# Patient Record
Sex: MALE | Race: WHITE | Employment: OTHER | URBAN - METROPOLITAN AREA
[De-identification: names, ages, dates, MRNs, and addresses within clinical notes are randomized per-mention and may not be internally consistent; named-entity substitution may affect disease eponyms.]

---

## 2019-07-01 LAB — PSA, ULTRASENSITIVE: 3.61

## 2019-12-30 LAB
CHOLESTEROL, TOTAL: 167 MG/DL
CHOLESTEROL/HDL RATIO: 4.6
CREATININE: 1.2 MG/DL
HDLC SERPL-MCNC: 36 MG/DL (ref 35–70)
LDL CHOLESTEROL CALCULATED: 104 MG/DL (ref 0–160)
POTASSIUM (K+): 4.3
PSA, ULTRASENSITIVE: 4.47
TRIGL SERPL-MCNC: 134 MG/DL
VLDLC SERPL CALC-MCNC: 27 MG/DL

## 2020-02-25 ENCOUNTER — APPOINTMENT (OUTPATIENT)
Dept: GENERAL RADIOLOGY | Age: 75
End: 2020-02-25
Payer: MEDICARE

## 2020-02-25 ENCOUNTER — HOSPITAL ENCOUNTER (EMERGENCY)
Age: 75
Discharge: HOME | End: 2020-02-25
Attending: EMERGENCY MEDICINE
Payer: MEDICARE

## 2020-02-25 ENCOUNTER — APPOINTMENT (OUTPATIENT)
Dept: ULTRASOUND IMAGING | Age: 75
End: 2020-02-25
Payer: MEDICARE

## 2020-02-25 VITALS
SYSTOLIC BLOOD PRESSURE: 124 MMHG | DIASTOLIC BLOOD PRESSURE: 78 MMHG | HEIGHT: 69 IN | TEMPERATURE: 97.6 F | WEIGHT: 200 LBS | HEART RATE: 72 BPM | BODY MASS INDEX: 29.62 KG/M2 | OXYGEN SATURATION: 95 % | RESPIRATION RATE: 16 BRPM

## 2020-02-25 LAB
ALBUMIN SERPL-MCNC: 4.1 G/DL (ref 3.5–5.2)
ALP BLD-CCNC: 93 U/L (ref 40–129)
ALT SERPL-CCNC: 61 U/L (ref 0–40)
ANION GAP SERPL CALCULATED.3IONS-SCNC: 9 MMOL/L (ref 7–16)
AST SERPL-CCNC: 41 U/L (ref 0–39)
BASOPHILS ABSOLUTE: 0.02 E9/L (ref 0–0.2)
BASOPHILS RELATIVE PERCENT: 0.3 % (ref 0–2)
BILIRUB SERPL-MCNC: 0.8 MG/DL (ref 0–1.2)
BUN BLDV-MCNC: 19 MG/DL (ref 8–23)
CALCIUM SERPL-MCNC: 9.2 MG/DL (ref 8.6–10.2)
CHLORIDE BLD-SCNC: 104 MMOL/L (ref 98–107)
CO2: 25 MMOL/L (ref 22–29)
CREAT SERPL-MCNC: 1.1 MG/DL (ref 0.7–1.2)
EOSINOPHILS ABSOLUTE: 0.1 E9/L (ref 0.05–0.5)
EOSINOPHILS RELATIVE PERCENT: 1.5 % (ref 0–6)
GFR AFRICAN AMERICAN: >60
GFR NON-AFRICAN AMERICAN: >60 ML/MIN/1.73
GLUCOSE BLD-MCNC: 102 MG/DL (ref 74–99)
HCT VFR BLD CALC: 39.5 % (ref 37–54)
HEMOGLOBIN: 13.8 G/DL (ref 12.5–16.5)
IMMATURE GRANULOCYTES #: 0.02 E9/L
IMMATURE GRANULOCYTES %: 0.3 % (ref 0–5)
LACTIC ACID: 0.9 MMOL/L (ref 0.5–2.2)
LIPASE: 17 U/L (ref 13–60)
LYMPHOCYTES ABSOLUTE: 0.94 E9/L (ref 1.5–4)
LYMPHOCYTES RELATIVE PERCENT: 14.1 % (ref 20–42)
MCH RBC QN AUTO: 32.8 PG (ref 26–35)
MCHC RBC AUTO-ENTMCNC: 34.9 % (ref 32–34.5)
MCV RBC AUTO: 93.8 FL (ref 80–99.9)
MONOCYTES ABSOLUTE: 0.43 E9/L (ref 0.1–0.95)
MONOCYTES RELATIVE PERCENT: 6.5 % (ref 2–12)
NEUTROPHILS ABSOLUTE: 5.14 E9/L (ref 1.8–7.3)
NEUTROPHILS RELATIVE PERCENT: 77.3 % (ref 43–80)
PDW BLD-RTO: 12.8 FL (ref 11.5–15)
PLATELET # BLD: 213 E9/L (ref 130–450)
PMV BLD AUTO: 9.3 FL (ref 7–12)
POTASSIUM REFLEX MAGNESIUM: 4.4 MMOL/L (ref 3.5–5)
RBC # BLD: 4.21 E12/L (ref 3.8–5.8)
SODIUM BLD-SCNC: 138 MMOL/L (ref 132–146)
TOTAL PROTEIN: 7.4 G/DL (ref 6.4–8.3)
TROPONIN: <0.01 NG/ML (ref 0–0.03)
WBC # BLD: 6.7 E9/L (ref 4.5–11.5)

## 2020-02-25 PROCEDURE — 85025 COMPLETE CBC W/AUTO DIFF WBC: CPT

## 2020-02-25 PROCEDURE — 76705 ECHO EXAM OF ABDOMEN: CPT

## 2020-02-25 PROCEDURE — 84484 ASSAY OF TROPONIN QUANT: CPT

## 2020-02-25 PROCEDURE — 83605 ASSAY OF LACTIC ACID: CPT

## 2020-02-25 PROCEDURE — 2580000003 HC RX 258: Performed by: EMERGENCY MEDICINE

## 2020-02-25 PROCEDURE — 96374 THER/PROPH/DIAG INJ IV PUSH: CPT

## 2020-02-25 PROCEDURE — 71046 X-RAY EXAM CHEST 2 VIEWS: CPT

## 2020-02-25 PROCEDURE — 99284 EMERGENCY DEPT VISIT MOD MDM: CPT

## 2020-02-25 PROCEDURE — 80053 COMPREHEN METABOLIC PANEL: CPT

## 2020-02-25 PROCEDURE — 93005 ELECTROCARDIOGRAM TRACING: CPT | Performed by: EMERGENCY MEDICINE

## 2020-02-25 PROCEDURE — 96375 TX/PRO/DX INJ NEW DRUG ADDON: CPT

## 2020-02-25 PROCEDURE — 83690 ASSAY OF LIPASE: CPT

## 2020-02-25 PROCEDURE — 6360000002 HC RX W HCPCS: Performed by: EMERGENCY MEDICINE

## 2020-02-25 PROCEDURE — 36415 COLL VENOUS BLD VENIPUNCTURE: CPT

## 2020-02-25 RX ORDER — MORPHINE SULFATE 4 MG/ML
4 INJECTION, SOLUTION INTRAMUSCULAR; INTRAVENOUS ONCE
Status: COMPLETED | OUTPATIENT
Start: 2020-02-25 | End: 2020-02-25

## 2020-02-25 RX ORDER — ONDANSETRON 4 MG/1
4 TABLET, ORALLY DISINTEGRATING ORAL EVERY 8 HOURS PRN
Qty: 10 TABLET | Refills: 0 | Status: SHIPPED | OUTPATIENT
Start: 2020-02-25 | End: 2020-12-09

## 2020-02-25 RX ORDER — CHOLECALCIFEROL (VITAMIN D3) 25 MCG
2500 TABLET,CHEWABLE ORAL DAILY
COMMUNITY

## 2020-02-25 RX ORDER — ONDANSETRON 2 MG/ML
4 INJECTION INTRAMUSCULAR; INTRAVENOUS ONCE
Status: COMPLETED | OUTPATIENT
Start: 2020-02-25 | End: 2020-02-25

## 2020-02-25 RX ORDER — 0.9 % SODIUM CHLORIDE 0.9 %
1000 INTRAVENOUS SOLUTION INTRAVENOUS ONCE
Status: COMPLETED | OUTPATIENT
Start: 2020-02-25 | End: 2020-02-25

## 2020-02-25 RX ORDER — M-VIT,TX,IRON,MINS/CALC/FOLIC 27MG-0.4MG
1 TABLET ORAL DAILY
COMMUNITY

## 2020-02-25 RX ORDER — HYDROCODONE BITARTRATE AND ACETAMINOPHEN 5; 325 MG/1; MG/1
1 TABLET ORAL EVERY 4 HOURS PRN
Qty: 4 TABLET | Refills: 0 | Status: SHIPPED | OUTPATIENT
Start: 2020-02-25 | End: 2020-02-28

## 2020-02-25 RX ADMIN — MORPHINE SULFATE 4 MG: 4 INJECTION, SOLUTION INTRAMUSCULAR; INTRAVENOUS at 09:36

## 2020-02-25 RX ADMIN — ONDANSETRON 4 MG: 2 INJECTION INTRAMUSCULAR; INTRAVENOUS at 09:36

## 2020-02-25 RX ADMIN — SODIUM CHLORIDE 1000 ML: 9 INJECTION, SOLUTION INTRAVENOUS at 09:37

## 2020-02-25 ASSESSMENT — ENCOUNTER SYMPTOMS
COUGH: 0
SINUS PAIN: 0
ABDOMINAL PAIN: 1
SHORTNESS OF BREATH: 0
CHEST TIGHTNESS: 0
NAUSEA: 0
VOMITING: 0
BACK PAIN: 0
DIARRHEA: 0
SORE THROAT: 0

## 2020-02-25 ASSESSMENT — PAIN SCALES - GENERAL
PAINLEVEL_OUTOF10: 8
PAINLEVEL_OUTOF10: 8
PAINLEVEL_OUTOF10: 7

## 2020-02-25 ASSESSMENT — PAIN DESCRIPTION - PAIN TYPE
TYPE: ACUTE PAIN
TYPE: ACUTE PAIN

## 2020-02-25 ASSESSMENT — PAIN DESCRIPTION - LOCATION: LOCATION: ABDOMEN

## 2020-02-25 ASSESSMENT — PAIN DESCRIPTION - FREQUENCY: FREQUENCY: CONTINUOUS

## 2020-02-25 ASSESSMENT — PAIN DESCRIPTION - ORIENTATION: ORIENTATION: RIGHT;UPPER

## 2020-02-25 ASSESSMENT — PAIN DESCRIPTION - DESCRIPTORS: DESCRIPTORS: SHARP;CONSTANT

## 2020-02-25 NOTE — ED PROVIDER NOTES
49-year-old male presents with right upper quadrant pain that has been going on for the past couple of days. He states the pain comes and goes but gets worse whenever he eats, he says that it really started hurting today after he ate a bagel this morning and drank coffee. He has a history of GERD but states this does not feel like his GERD. No cardiac history. No cough, no shortness of breath, no chest pain. No falls. No travel, no history of blood clot, no recent surgeries. He had an appendectomy 70 years ago. The history is provided by the patient. No  was used. Review of Systems   Constitutional: Negative for activity change, chills, fatigue and fever. HENT: Negative for congestion, sinus pain and sore throat. Eyes: Negative for visual disturbance. Respiratory: Negative for cough, chest tightness and shortness of breath. Cardiovascular: Negative for chest pain, palpitations and leg swelling. Gastrointestinal: Positive for abdominal pain. Negative for diarrhea, nausea and vomiting. Genitourinary: Negative for dysuria and urgency. Musculoskeletal: Negative for back pain, neck pain and neck stiffness. Skin: Negative for rash. Neurological: Negative for dizziness, syncope and headaches. Psychiatric/Behavioral: Negative for confusion. Physical Exam  Vitals signs and nursing note reviewed. Constitutional:       General: He is not in acute distress. Appearance: He is well-developed. He is not diaphoretic. HENT:      Head: Normocephalic and atraumatic. Nose: Nose normal.      Mouth/Throat:      Pharynx: No oropharyngeal exudate. Eyes:      Conjunctiva/sclera: Conjunctivae normal.      Pupils: Pupils are equal, round, and reactive to light. Neck:      Musculoskeletal: Normal range of motion and neck supple. Cardiovascular:      Rate and Rhythm: Normal rate and regular rhythm. Pulses: Normal pulses.       Heart sounds: Normal heart sounds. Pulmonary:      Effort: Pulmonary effort is normal. No respiratory distress. Breath sounds: Normal breath sounds. Chest:      Chest wall: No tenderness. Abdominal:      General: Abdomen is flat. Bowel sounds are normal. There is no distension. Palpations: Abdomen is soft. Tenderness: There is abdominal tenderness. Comments: +murphys, no rebound or guarding   Musculoskeletal: Normal range of motion. Skin:     General: Skin is warm and dry. Capillary Refill: Capillary refill takes less than 2 seconds. Neurological:      Mental Status: He is alert and oriented to person, place, and time. Cranial Nerves: No cranial nerve deficit. Sensory: No sensory deficit. Motor: No abnormal muscle tone. Psychiatric:         Behavior: Behavior normal.         Thought Content: Thought content normal.         Judgment: Judgment normal.          Procedures     MDM  Number of Diagnoses or Management Options  Abdominal pain, unspecified abdominal location:   Abnormal ultrasound:   Diagnosis management comments: Patient presents with right upper quadrant pain that radiates to his right shoulder. He appears uncomfortable but in no acute distress. Vitals are stable. Will obtain work-up to evaluate for cholecystitis. Ultrasound shows evidence of fatty infiltrate versus hepatocellular disease, no evidence of cholecystitis. ALT and AST are only slightly elevated. Patient feels much better on reevaluation, repeat abdominal exam is actually benign. Discussed following up with GI for reevaluation or returning if condition worsens. All questions answered and he is discharged with stable vitals.        Amount and/or Complexity of Data Reviewed  Clinical lab tests: reviewed and ordered  Tests in the radiology section of CPT®: reviewed and ordered  Tests in the medicine section of CPT®: reviewed         ED Course as of Feb 25 1211   Tue Feb 25, 2020   0812 GB contracted on US, patient cannot lay still for exam. Will retry. [CW]   0988 EKG: This EKG is signed and interpreted by me. Rate: 81  Rhythm: Sinus  Interpretation: nsr, no luan  Comparison: unchanged      [CW]   1111 US GALLBLADDER RUQ [CW]   1135 Discussed results, patient states he is feeling better. [CW]   4131 Will have him follow with GI.    [CW]   1206 Patient does not want to wait for urinalysis. Will discharge with GI follow up. [CW]      ED Course User Index  [CW] Kailynjohn Moses DO        --------------------------------------------- PAST HISTORY ---------------------------------------------  Past Medical History:  has a past medical history of Acid reflux. Past Surgical History:  has a past surgical history that includes Carpal tunnel release (Right); Appendectomy; knee surgery (Right); Tonsillectomy; and Colonoscopy (11/02/2016). Social History:  reports that he has quit smoking. He has never used smokeless tobacco. He reports that he does not drink alcohol or use drugs. Family History: family history is not on file. The patients home medications have been reviewed. Allergies: Patient has no known allergies.     -------------------------------------------------- RESULTS -------------------------------------------------  Labs:  Results for orders placed or performed during the hospital encounter of 02/25/20   CBC Auto Differential   Result Value Ref Range    WBC 6.7 4.5 - 11.5 E9/L    RBC 4.21 3.80 - 5.80 E12/L    Hemoglobin 13.8 12.5 - 16.5 g/dL    Hematocrit 39.5 37.0 - 54.0 %    MCV 93.8 80.0 - 99.9 fL    MCH 32.8 26.0 - 35.0 pg    MCHC 34.9 (H) 32.0 - 34.5 %    RDW 12.8 11.5 - 15.0 fL    Platelets 102 280 - 353 E9/L    MPV 9.3 7.0 - 12.0 fL    Neutrophils % 77.3 43.0 - 80.0 %    Immature Granulocytes % 0.3 0.0 - 5.0 %    Lymphocytes % 14.1 (L) 20.0 - 42.0 %    Monocytes % 6.5 2.0 - 12.0 %    Eosinophils % 1.5 0.0 - 6.0 %    Basophils % 0.3 0.0 - 2.0 %    Neutrophils Absolute 5.14 1.80 - 7.30 E9/L Abdominal pain, unspecified abdominal location    2. Abnormal ultrasound        Disposition:  Patient's disposition: Discharge to home  Patient's condition is stable.          Hayder Rosenberg DO  Resident  02/25/20 [id number]

## 2020-02-26 LAB
EKG ATRIAL RATE: 81 BPM
EKG P AXIS: -27 DEGREES
EKG P-R INTERVAL: 160 MS
EKG Q-T INTERVAL: 396 MS
EKG QRS DURATION: 84 MS
EKG QTC CALCULATION (BAZETT): 460 MS
EKG R AXIS: -35 DEGREES
EKG T AXIS: -28 DEGREES
EKG VENTRICULAR RATE: 81 BPM

## 2020-02-26 PROCEDURE — 93010 ELECTROCARDIOGRAM REPORT: CPT | Performed by: INTERNAL MEDICINE

## 2020-03-09 ENCOUNTER — OFFICE VISIT (OUTPATIENT)
Dept: SURGERY | Age: 75
End: 2020-03-09
Payer: MEDICARE

## 2020-03-09 VITALS
BODY MASS INDEX: 29.35 KG/M2 | TEMPERATURE: 97.5 F | HEIGHT: 70 IN | DIASTOLIC BLOOD PRESSURE: 80 MMHG | WEIGHT: 205 LBS | HEART RATE: 84 BPM | SYSTOLIC BLOOD PRESSURE: 136 MMHG | OXYGEN SATURATION: 96 %

## 2020-03-09 PROCEDURE — 1036F TOBACCO NON-USER: CPT | Performed by: SURGERY

## 2020-03-09 PROCEDURE — G8427 DOCREV CUR MEDS BY ELIG CLIN: HCPCS | Performed by: SURGERY

## 2020-03-09 PROCEDURE — 4040F PNEUMOC VAC/ADMIN/RCVD: CPT | Performed by: SURGERY

## 2020-03-09 PROCEDURE — 99204 OFFICE O/P NEW MOD 45 MIN: CPT | Performed by: SURGERY

## 2020-03-09 PROCEDURE — G8484 FLU IMMUNIZE NO ADMIN: HCPCS | Performed by: SURGERY

## 2020-03-09 PROCEDURE — 1123F ACP DISCUSS/DSCN MKR DOCD: CPT | Performed by: SURGERY

## 2020-03-09 PROCEDURE — G8417 CALC BMI ABV UP PARAM F/U: HCPCS | Performed by: SURGERY

## 2020-03-09 NOTE — PROGRESS NOTES
General Surgery History and Physical    Patient's Name/Date of Birth: Jaye Solano / 1945    Date: March 9, 2020     Surgeon: Deanna Brunner M.D.    PCP: Deepti Hough MD     Chief Complaint: ruq pain    HPI:   Jaye Solano is a 66 y.o. male who presents for evaluation of ruq pain. Timing is intermittent and workup in ED was negative, radiation to epigastrium, alleviated by nothing and started several days ago and severity is 3-8/10      Past Medical History:   Diagnosis Date    Acid reflux        Past Surgical History:   Procedure Laterality Date    APPENDECTOMY      CARPAL TUNNEL RELEASE Right     COLONOSCOPY  11/02/2016    KNEE SURGERY Right     TONSILLECTOMY         Current Outpatient Medications   Medication Sig Dispense Refill    Cyanocobalamin (B-12) 2500 MCG TABS Take 2,500 mg by mouth daily      Multiple Vitamins-Minerals (THERAPEUTIC MULTIVITAMIN-MINERALS) tablet Take 1 tablet by mouth daily      KRILL OIL PO Take 1 capsule by mouth daily      influenza virus trivalent vaccine (FLUZONE) injection Inject 0.5 mLs into the muscle once Given 11/2019      calcium carbonate (TUMS) 500 MG chewable tablet Take 2 tablets by mouth 3 times daily as needed for Heartburn      pantoprazole (PROTONIX) 40 MG tablet Take 40 mg by mouth every evening       ondansetron (ZOFRAN ODT) 4 MG disintegrating tablet Take 1 tablet by mouth every 8 hours as needed for Nausea or Vomiting (Patient not taking: Reported on 3/9/2020) 10 tablet 0     No current facility-administered medications for this visit. No Known Allergies    The patient has a family history that is negative for severe cardiovascular or respiratory issues, negative for reaction to anesthesia.     Social History     Socioeconomic History    Marital status:      Spouse name: Not on file    Number of children: Not on file    Years of education: Not on file    Highest education level: Not on file   Occupational History   

## 2020-03-16 ENCOUNTER — HOSPITAL ENCOUNTER (OUTPATIENT)
Dept: NUCLEAR MEDICINE | Age: 75
Discharge: HOME | End: 2020-03-16
Payer: MEDICARE

## 2020-03-16 ENCOUNTER — OFFICE VISIT (OUTPATIENT)
Dept: SURGERY | Age: 75
End: 2020-03-16
Payer: MEDICARE

## 2020-03-16 VITALS
TEMPERATURE: 97.4 F | DIASTOLIC BLOOD PRESSURE: 78 MMHG | HEIGHT: 70 IN | OXYGEN SATURATION: 93 % | SYSTOLIC BLOOD PRESSURE: 125 MMHG | HEART RATE: 80 BPM | WEIGHT: 200 LBS | BODY MASS INDEX: 28.63 KG/M2

## 2020-03-16 VITALS — WEIGHT: 200 LBS | BODY MASS INDEX: 28.7 KG/M2

## 2020-03-16 PROCEDURE — G8484 FLU IMMUNIZE NO ADMIN: HCPCS | Performed by: SURGERY

## 2020-03-16 PROCEDURE — 6360000002 HC RX W HCPCS: Performed by: RADIOLOGY

## 2020-03-16 PROCEDURE — 78227 HEPATOBIL SYST IMAGE W/DRUG: CPT

## 2020-03-16 PROCEDURE — 1036F TOBACCO NON-USER: CPT | Performed by: SURGERY

## 2020-03-16 PROCEDURE — 99213 OFFICE O/P EST LOW 20 MIN: CPT | Performed by: SURGERY

## 2020-03-16 PROCEDURE — G8427 DOCREV CUR MEDS BY ELIG CLIN: HCPCS | Performed by: SURGERY

## 2020-03-16 PROCEDURE — 2580000003 HC RX 258: Performed by: RADIOLOGY

## 2020-03-16 PROCEDURE — A9537 TC99M MEBROFENIN: HCPCS | Performed by: RADIOLOGY

## 2020-03-16 PROCEDURE — 4040F PNEUMOC VAC/ADMIN/RCVD: CPT | Performed by: SURGERY

## 2020-03-16 PROCEDURE — G8417 CALC BMI ABV UP PARAM F/U: HCPCS | Performed by: SURGERY

## 2020-03-16 PROCEDURE — 3430000000 HC RX DIAGNOSTIC RADIOPHARMACEUTICAL: Performed by: RADIOLOGY

## 2020-03-16 PROCEDURE — 1123F ACP DISCUSS/DSCN MKR DOCD: CPT | Performed by: SURGERY

## 2020-03-16 RX ADMIN — Medication 6 MILLICURIE: at 07:34

## 2020-03-16 RX ADMIN — SODIUM CHLORIDE 1.81 MCG: 9 INJECTION, SOLUTION INTRAVENOUS at 08:34

## 2020-03-16 NOTE — PROGRESS NOTES
General Surgery History and Physical    Patient's Name/Date of Birth: Marcin Patton / 1945    Date: March 16, 2020     Surgeon: Jeanne Gutierrez M.D.    PCP: Angeline Ha MD     Chief Complaint: biliary dyskinesia    HPI:   Marcin Patton is a 66 y.o. male who presents for evaluation of biliary dyskinesia. Timing is intermittent, radiation to back, alleviated by npo and started several weeks ago      Past Medical History:   Diagnosis Date    Acid reflux        Past Surgical History:   Procedure Laterality Date    APPENDECTOMY      CARPAL TUNNEL RELEASE Right     COLONOSCOPY  11/02/2016    KNEE SURGERY Right     TONSILLECTOMY         Current Outpatient Medications   Medication Sig Dispense Refill    Cyanocobalamin (B-12) 2500 MCG TABS Take 2,500 mg by mouth daily      Multiple Vitamins-Minerals (THERAPEUTIC MULTIVITAMIN-MINERALS) tablet Take 1 tablet by mouth daily      KRILL OIL PO Take 1 capsule by mouth daily      influenza virus trivalent vaccine (FLUZONE) injection Inject 0.5 mLs into the muscle once Given 11/2019      calcium carbonate (TUMS) 500 MG chewable tablet Take 2 tablets by mouth 3 times daily as needed for Heartburn      pantoprazole (PROTONIX) 40 MG tablet Take 40 mg by mouth every evening       ondansetron (ZOFRAN ODT) 4 MG disintegrating tablet Take 1 tablet by mouth every 8 hours as needed for Nausea or Vomiting (Patient not taking: Reported on 3/9/2020) 10 tablet 0     No current facility-administered medications for this visit. No Known Allergies    The patient has a family history that is negative for severe cardiovascular or respiratory issues, negative for reaction to anesthesia.     Social History     Socioeconomic History    Marital status:      Spouse name: Not on file    Number of children: Not on file    Years of education: Not on file    Highest education level: Not on file   Occupational History    Not on file   Social Needs    Financial resource strain: Not on file    Food insecurity     Worry: Not on file     Inability: Not on file    Transportation needs     Medical: Not on file     Non-medical: Not on file   Tobacco Use    Smoking status: Former Smoker    Smokeless tobacco: Never Used   Substance and Sexual Activity    Alcohol use: No    Drug use: No    Sexual activity: Not on file   Lifestyle    Physical activity     Days per week: Not on file     Minutes per session: Not on file    Stress: Not on file   Relationships    Social connections     Talks on phone: Not on file     Gets together: Not on file     Attends Rastafari service: Not on file     Active member of club or organization: Not on file     Attends meetings of clubs or organizations: Not on file     Relationship status: Not on file    Intimate partner violence     Fear of current or ex partner: Not on file     Emotionally abused: Not on file     Physically abused: Not on file     Forced sexual activity: Not on file   Other Topics Concern    Not on file   Social History Narrative    Not on file           Review of Systems  Review of Systems -  General ROS: negative for - chills, fatigue or malaise  ENT ROS: negative for - hearing change, nasal congestion or nasal discharge  Allergy and Immunology ROS: negative for - hives, itchy/watery eyes or nasal congestion  Hematological and Lymphatic ROS: negative for - blood clots, blood transfusions, bruising or fatigue  Endocrine ROS: negative for - malaise/lethargy, mood swings, palpitations or polydipsia/polyuria  Breast ROS: negative for - new or changing breast lumps or nipple changes  Respiratory ROS: negative for - sputum changes, stridor, tachypnea or wheezing  Cardiovascular ROS: negative for - irregular heartbeat, loss of consciousness, murmur or orthopnea  Gastrointestinal ROS: negative for - constipation, diarrhea, gas/bloating, heartburn or hematemesis, positive for pain and nausea  Genito-Urinary ROS: negative for - genital discharge, genital ulcers or hematuria  Musculoskeletal ROS: negative for - gait disturbance, muscle pain or muscular weakness    Physical exam:   /78 (Site: Right Upper Arm, Position: Sitting, Cuff Size: Medium Adult)   Pulse 80   Temp 97.4 °F (36.3 °C) (Oral)   Ht 5' 10\" (1.778 m)   Wt 200 lb (90.7 kg)   SpO2 93%   BMI 28.70 kg/m²   General appearance:  NAD  Pyscho/social: negative for tremors and hallucinations  Head: NCAT, PERRLA, EOMI, red conjunctiva  Neck: supple, no masses  Lungs: CTAB, equal chest rise bilateral  Heart: Reg rate  Abdomen: soft, nontender, nondistended  Skin; no lesions  Gu: no cva tenderness  Extremities: extremities normal, atraumatic, no cyanosis or edema      Radiology:  HIDA- dyskinesia. Assessment:  66 y.o. male with biliary dyskinesia    Plan:  We discussed surgery and he will call if he decides to pursue it.        Doris Solis MD  1:24 PM  3/16/2020

## 2020-05-07 VITALS
RESPIRATION RATE: 14 BRPM | SYSTOLIC BLOOD PRESSURE: 120 MMHG | DIASTOLIC BLOOD PRESSURE: 78 MMHG | WEIGHT: 206 LBS | HEART RATE: 78 BPM | BODY MASS INDEX: 30.42 KG/M2

## 2020-08-12 VITALS
SYSTOLIC BLOOD PRESSURE: 108 MMHG | RESPIRATION RATE: 14 BRPM | WEIGHT: 210 LBS | DIASTOLIC BLOOD PRESSURE: 60 MMHG | HEART RATE: 68 BPM | BODY MASS INDEX: 30.13 KG/M2 | TEMPERATURE: 96.6 F

## 2020-10-08 ENCOUNTER — HOSPITAL ENCOUNTER (OUTPATIENT)
Age: 75
Discharge: HOME | End: 2020-10-08
Payer: MEDICARE

## 2020-10-08 LAB
ALBUMIN SERPL-MCNC: 4.1 G/DL (ref 3.5–5.2)
ALP BLD-CCNC: 62 U/L (ref 40–129)
ALT SERPL-CCNC: 19 U/L (ref 0–40)
ANION GAP SERPL CALCULATED.3IONS-SCNC: 10 MMOL/L (ref 7–16)
AST SERPL-CCNC: 18 U/L (ref 0–39)
BASOPHILS ABSOLUTE: 0.03 E9/L (ref 0–0.2)
BASOPHILS RELATIVE PERCENT: 0.6 % (ref 0–2)
BILIRUB SERPL-MCNC: 0.9 MG/DL (ref 0–1.2)
BUN BLDV-MCNC: 18 MG/DL (ref 8–23)
CALCIUM SERPL-MCNC: 9.6 MG/DL (ref 8.6–10.2)
CHLORIDE BLD-SCNC: 104 MMOL/L (ref 98–107)
CHOLESTEROL, FASTING: 165 MG/DL (ref 0–199)
CO2: 27 MMOL/L (ref 22–29)
CREAT SERPL-MCNC: 1.2 MG/DL (ref 0.7–1.2)
EOSINOPHILS ABSOLUTE: 0.1 E9/L (ref 0.05–0.5)
EOSINOPHILS RELATIVE PERCENT: 2.1 % (ref 0–6)
GFR AFRICAN AMERICAN: >60
GFR NON-AFRICAN AMERICAN: 59 ML/MIN/1.73
GLUCOSE FASTING: 98 MG/DL (ref 74–99)
HBA1C MFR BLD: 5 % (ref 4–5.6)
HCT VFR BLD CALC: 42.8 % (ref 37–54)
HDLC SERPL-MCNC: 44 MG/DL
HEMOGLOBIN: 14.6 G/DL (ref 12.5–16.5)
IMMATURE GRANULOCYTES #: 0.01 E9/L
IMMATURE GRANULOCYTES %: 0.2 % (ref 0–5)
LDL CHOLESTEROL CALCULATED: 105 MG/DL (ref 0–99)
LYMPHOCYTES ABSOLUTE: 1.33 E9/L (ref 1.5–4)
LYMPHOCYTES RELATIVE PERCENT: 27.4 % (ref 20–42)
MCH RBC QN AUTO: 32.5 PG (ref 26–35)
MCHC RBC AUTO-ENTMCNC: 34.1 % (ref 32–34.5)
MCV RBC AUTO: 95.3 FL (ref 80–99.9)
MONOCYTES ABSOLUTE: 0.35 E9/L (ref 0.1–0.95)
MONOCYTES RELATIVE PERCENT: 7.2 % (ref 2–12)
NEUTROPHILS ABSOLUTE: 3.03 E9/L (ref 1.8–7.3)
NEUTROPHILS RELATIVE PERCENT: 62.5 % (ref 43–80)
PDW BLD-RTO: 13.5 FL (ref 11.5–15)
PLATELET # BLD: 193 E9/L (ref 130–450)
PMV BLD AUTO: 9.6 FL (ref 7–12)
POTASSIUM SERPL-SCNC: 5.2 MMOL/L (ref 3.5–5)
PROSTATE SPECIFIC ANTIGEN: 6.3 NG/ML (ref 0–4)
RBC # BLD: 4.49 E12/L (ref 3.8–5.8)
SODIUM BLD-SCNC: 141 MMOL/L (ref 132–146)
TOTAL PROTEIN: 7.3 G/DL (ref 6.4–8.3)
TRIGLYCERIDE, FASTING: 80 MG/DL (ref 0–149)
TSH SERPL DL<=0.05 MIU/L-ACNC: 1.3 UIU/ML (ref 0.27–4.2)
VITAMIN B-12: 1388 PG/ML (ref 211–946)
VLDLC SERPL CALC-MCNC: 16 MG/DL
WBC # BLD: 4.9 E9/L (ref 4.5–11.5)

## 2020-10-08 PROCEDURE — 80061 LIPID PANEL: CPT

## 2020-10-08 PROCEDURE — 82607 VITAMIN B-12: CPT

## 2020-10-08 PROCEDURE — 84153 ASSAY OF PSA TOTAL: CPT

## 2020-10-08 PROCEDURE — 83036 HEMOGLOBIN GLYCOSYLATED A1C: CPT

## 2020-10-08 PROCEDURE — 80053 COMPREHEN METABOLIC PANEL: CPT

## 2020-10-08 PROCEDURE — 85025 COMPLETE CBC W/AUTO DIFF WBC: CPT

## 2020-10-08 PROCEDURE — 84443 ASSAY THYROID STIM HORMONE: CPT

## 2020-10-08 PROCEDURE — 36415 COLL VENOUS BLD VENIPUNCTURE: CPT

## 2020-10-21 RX ORDER — PANTOPRAZOLE SODIUM 40 MG/1
40 TABLET, DELAYED RELEASE ORAL EVERY EVENING
Qty: 90 TABLET | Refills: 1 | Status: SHIPPED
Start: 2020-10-21 | End: 2020-10-26 | Stop reason: SDUPTHER

## 2020-10-21 NOTE — TELEPHONE ENCOUNTER
Patient is requesting a refill of:  Medication: PANTOPRAZOLE   Dose: 40  Frequency: QD  Pharmacy: [id number] seen Visit date not found  Next appt Visit date not found

## 2020-10-26 RX ORDER — PANTOPRAZOLE SODIUM 40 MG/1
40 TABLET, DELAYED RELEASE ORAL EVERY EVENING
Qty: 90 TABLET | Refills: 3 | Status: SHIPPED | OUTPATIENT
Start: 2020-10-26 | End: 2021-10-05 | Stop reason: SDUPTHER

## 2020-10-26 NOTE — TELEPHONE ENCOUNTER
Pt called and stated he has been unable to contact the office. Pantoprazole was called into the wrong pharmacy. It went to [facility] and should have went to [facility] Please correct and contact pt if needed.

## 2020-12-09 ENCOUNTER — OFFICE VISIT (OUTPATIENT)
Dept: FAMILY MEDICINE CLINIC | Age: 75
End: 2020-12-09
Payer: MEDICARE

## 2020-12-09 VITALS
SYSTOLIC BLOOD PRESSURE: 114 MMHG | BODY MASS INDEX: 31.1 KG/M2 | HEIGHT: 69 IN | HEART RATE: 66 BPM | OXYGEN SATURATION: 97 % | DIASTOLIC BLOOD PRESSURE: 74 MMHG | WEIGHT: 210 LBS

## 2020-12-09 PROCEDURE — 4040F PNEUMOC VAC/ADMIN/RCVD: CPT | Performed by: INTERNAL MEDICINE

## 2020-12-09 PROCEDURE — G8427 DOCREV CUR MEDS BY ELIG CLIN: HCPCS | Performed by: INTERNAL MEDICINE

## 2020-12-09 PROCEDURE — 99213 OFFICE O/P EST LOW 20 MIN: CPT | Performed by: INTERNAL MEDICINE

## 2020-12-09 PROCEDURE — G8417 CALC BMI ABV UP PARAM F/U: HCPCS | Performed by: INTERNAL MEDICINE

## 2020-12-09 PROCEDURE — 1123F ACP DISCUSS/DSCN MKR DOCD: CPT | Performed by: INTERNAL MEDICINE

## 2020-12-09 PROCEDURE — 96372 THER/PROPH/DIAG INJ SC/IM: CPT | Performed by: INTERNAL MEDICINE

## 2020-12-09 PROCEDURE — G8484 FLU IMMUNIZE NO ADMIN: HCPCS | Performed by: INTERNAL MEDICINE

## 2020-12-09 PROCEDURE — 1036F TOBACCO NON-USER: CPT | Performed by: INTERNAL MEDICINE

## 2020-12-09 RX ORDER — CYANOCOBALAMIN 1000 UG/ML
1000 INJECTION INTRAMUSCULAR; SUBCUTANEOUS ONCE
Status: COMPLETED | OUTPATIENT
Start: 2020-12-09 | End: 2020-12-09

## 2020-12-09 RX ADMIN — CYANOCOBALAMIN 1000 MCG: 1000 INJECTION INTRAMUSCULAR; SUBCUTANEOUS at 10:59

## 2020-12-09 ASSESSMENT — PATIENT HEALTH QUESTIONNAIRE - PHQ9
SUM OF ALL RESPONSES TO PHQ QUESTIONS 1-9: 0
SUM OF ALL RESPONSES TO PHQ9 QUESTIONS 1 & 2: 0
1. LITTLE INTEREST OR PLEASURE IN DOING THINGS: 0
SUM OF ALL RESPONSES TO PHQ QUESTIONS 1-9: 0
SUM OF ALL RESPONSES TO PHQ QUESTIONS 1-9: 0
2. FEELING DOWN, DEPRESSED OR HOPELESS: 0

## 2020-12-09 NOTE — PROGRESS NOTES
Subjective:     Chief Complaint   Patient presents with    Leg Pain   Patient came here to have a B12 injection which is really helping he had burning sensation of the tongue B12 has made a big difference he feels much better  He does have Gómez's esophagus he follows with the GI  He complains of pain in the left leg posteriorly below the gluteal area  He has been exercising a lot there was no swelling he was concerned about a blood clot  He had a blood work done in October  He does follow with Dr. Aury Hansen his PSA is elevated    Past Medical History:   Diagnosis Date    Acid reflux     Barrette esophagus     Gómez esophagus     BPH (benign prostatic hyperplasia)     Pernicious anemia         Social History     Socioeconomic History    Marital status:      Spouse name: Not on file    Number of children: Not on file    Years of education: Not on file    Highest education level: Not on file   Occupational History    Not on file   Social Needs    Financial resource strain: Not on file    Food insecurity     Worry: Not on file     Inability: Not on file    Transportation needs     Medical: Not on file     Non-medical: Not on file   Tobacco Use    Smoking status: Former Smoker     Packs/day: 1.00     Years: 5.00     Pack years: 5.00     Last attempt to quit: 1986     Years since quittin.0    Smokeless tobacco: Never Used   Substance and Sexual Activity    Alcohol use: No    Drug use: No    Sexual activity: Not on file   Lifestyle    Physical activity     Days per week: Not on file     Minutes per session: Not on file    Stress: Not on file   Relationships    Social connections     Talks on phone: Not on file     Gets together: Not on file     Attends Catholic service: Not on file     Active member of club or organization: Not on file     Attends meetings of clubs or organizations: Not on file     Relationship status: Not on file    Intimate partner violence     Fear of current or ex partner: Not on file     Emotionally abused: Not on file     Physically abused: Not on file     Forced sexual activity: Not on file   Other Topics Concern    Not on file   Social History Narrative    Not on file        Past Surgical History:   Procedure Laterality Date    APPENDECTOMY      CARPAL TUNNEL RELEASE Right     COLONOSCOPY  11/02/2016    KNEE SURGERY Right     TONSILLECTOMY          Family History   Problem Relation Age of Onset    High Blood Pressure Mother     Uterine Cancer Mother     Dementia Mother     Atrial Fibrillation Mother     Coronary Art Dis Mother     High Blood Pressure Father     Other Father         myelodysplatic syndrome         No Known Allergies     ROS  No acute distress  Cardiac: Denies any chest pain or palpitation  Respiratory: Denies any cough or shortness of breath no history of DVT PE  GI: No abdominal pain. Denies any nausea vomiting or diarrhea  : Denies any dysuria frequency or hematuria elevated PSA seen by Dr. Jacoby Degroot  Neuro: No headache or dizziness  Endocrine: No diabetes  Skin: normal  No recent weight gain or weight loss  Denies any change in vision    Objective:    /74   Pulse 66   Ht 5' 9\" (1.753 m)   Wt 210 lb (95.3 kg)   SpO2 97%   BMI 31.01 kg/m²     Constitutional: Alert awake and oriented  Eyes: Pupils equal bilaterally. Extraocular muscles intact  Neck: no JVD adenopathy no bruit  Heart:  RRR, no murmurs, gallops, or rubs.   Lungs:    no wheeze, rales or rhonchi  Abd: bowel sounds present, nontender, nondistended, no masses  Extrem:  No clubbing, cyanosis, or edema  No calf tenderness no swelling Homans' sign negative  Neuro: AAOx3,No Focal deficit  Psychological: no depression or anxiety       Current Outpatient Medications   Medication Sig Dispense Refill    pantoprazole (PROTONIX) 40 MG tablet Take 1 tablet by mouth every evening 90 tablet 3    Cyanocobalamin (B-12) 2500 MCG TABS Take 2,500 mg by mouth daily      Multiple Vitamins-Minerals (THERAPEUTIC MULTIVITAMIN-MINERALS) tablet Take 1 tablet by mouth daily      KRILL OIL PO Take 1 capsule by mouth daily      calcium carbonate (TUMS) 500 MG chewable tablet Take 2 tablets by mouth 3 times daily as needed for Heartburn       No current facility-administered medications for this visit.          Last 3 BMP  Lab Results   Component Value Date/Time     10/08/2020 09:14 AM     02/25/2020 08:34 AM     06/28/2017 09:20 AM    K 5.2 (H) 10/08/2020 09:14 AM    K 4.4 02/25/2020 08:34 AM    K 4.3 12/30/2019    K 4.5 06/28/2017 09:20 AM    K 4.5 09/12/2016 08:47 AM     10/08/2020 09:14 AM     02/25/2020 08:34 AM     06/28/2017 09:20 AM    CO2 27 10/08/2020 09:14 AM    CO2 25 02/25/2020 08:34 AM    CO2 26 06/28/2017 09:20 AM    BUN 18 10/08/2020 09:14 AM    BUN 19 02/25/2020 08:34 AM    BUN 16 06/28/2017 09:20 AM    CREATININE 1.2 10/08/2020 09:14 AM    CREATININE 1.1 02/25/2020 08:34 AM    CREATININE 1.2 12/30/2019    CREATININE 1.1 06/28/2017 09:20 AM    GLUCOSE 102 (H) 02/25/2020 08:34 AM    GLUCOSE 121 (H) 06/28/2017 09:20 AM    GLUCOSE 88 09/12/2016 08:47 AM    GLUCOSE 102 02/24/2012 08:38 AM    GLUCOSE 94 11/08/2010 08:48 AM    CALCIUM 9.6 10/08/2020 09:14 AM    CALCIUM 9.2 02/25/2020 08:34 AM    CALCIUM 9.3 06/28/2017 09:20 AM       Last 3 CMP:    Lab Results   Component Value Date/Time     10/08/2020 09:14 AM     02/25/2020 08:34 AM     06/28/2017 09:20 AM    K 5.2 (H) 10/08/2020 09:14 AM    K 4.4 02/25/2020 08:34 AM    K 4.3 12/30/2019    K 4.5 06/28/2017 09:20 AM    K 4.5 09/12/2016 08:47 AM     10/08/2020 09:14 AM     02/25/2020 08:34 AM     06/28/2017 09:20 AM    CO2 27 10/08/2020 09:14 AM    CO2 25 02/25/2020 08:34 AM    CO2 26 06/28/2017 09:20 AM    BUN 18 10/08/2020 09:14 AM    BUN 19 02/25/2020 08:34 AM    BUN 16 06/28/2017 09:20 AM    CREATININE 1.2 10/08/2020 09:14 AM    CREATININE 1.1 02/25/2020 08:34 AM CREATININE 1.2 12/30/2019    CREATININE 1.1 06/28/2017 09:20 AM    GLUCOSE 102 (H) 02/25/2020 08:34 AM    GLUCOSE 121 (H) 06/28/2017 09:20 AM    GLUCOSE 88 09/12/2016 08:47 AM    GLUCOSE 102 02/24/2012 08:38 AM    GLUCOSE 94 11/08/2010 08:48 AM    CALCIUM 9.6 10/08/2020 09:14 AM    CALCIUM 9.2 02/25/2020 08:34 AM    CALCIUM 9.3 06/28/2017 09:20 AM    PROT 7.3 10/08/2020 09:14 AM    PROT 7.4 02/25/2020 08:34 AM    PROT 7.5 09/12/2016 08:47 AM    LABALBU 4.1 10/08/2020 09:14 AM    LABALBU 4.1 02/25/2020 08:34 AM    LABALBU 4.0 09/12/2016 08:47 AM    LABALBU 4.1 02/24/2012 08:38 AM    LABALBU 4.3 11/08/2010 08:48 AM    BILITOT 0.9 10/08/2020 09:14 AM    BILITOT 0.8 02/25/2020 08:34 AM    BILITOT 0.7 09/12/2016 08:47 AM    ALKPHOS 62 10/08/2020 09:14 AM    ALKPHOS 93 02/25/2020 08:34 AM    ALKPHOS 81 09/12/2016 08:47 AM    AST 18 10/08/2020 09:14 AM    AST 41 (H) 02/25/2020 08:34 AM    AST 20 09/12/2016 08:47 AM    ALT 19 10/08/2020 09:14 AM    ALT 61 (H) 02/25/2020 08:34 AM    ALT 19 09/12/2016 08:47 AM        CBC:   Lab Results   Component Value Date/Time    WBC 4.9 10/08/2020 09:14 AM    RBC 4.49 10/08/2020 09:14 AM    HGB 14.6 10/08/2020 09:14 AM    HCT 42.8 10/08/2020 09:14 AM    MCV 95.3 10/08/2020 09:14 AM    MCH 32.5 10/08/2020 09:14 AM    MCHC 34.1 10/08/2020 09:14 AM    RDW 13.5 10/08/2020 09:14 AM     10/08/2020 09:14 AM    MPV 9.6 10/08/2020 09:14 AM       A1C:  Lab Results   Component Value Date/Time    LABA1C 5.0 10/08/2020 09:14 AM       Lipid panel:  Lab Results   Component Value Date    CHOL 167 12/30/2019    CHOL 175 09/12/2016    CHOL 180 04/29/2015    TRIG 134 12/30/2019    TRIG 142 09/12/2016    TRIG 156 04/29/2015    HDL 44 10/08/2020    HDL 36 12/30/2019    HDL 32 09/12/2016        Lab Results   Component Value Date/Time    PROT 7.3 10/08/2020 09:14 AM    PROT 7.4 02/25/2020 08:34 AM    PROT 7.5 09/12/2016 08:47 AM       No results found for: MG      Assessment.   Benito Blend was seen today for leg pain. Diagnoses and all orders for this visit:    Pain and swelling of left lower leg  -     US DUP LOWER EXTREMITY LEFT DIMA; Future    Hyperlipidemia, unspecified hyperlipidemia type    Gómez's esophagus without dysplasia    Pernicious anemia    Elevated PSA       Patient Active Problem List   Diagnosis    Hemorrhoid thrombosis       Plan: Ultrasound left leg rule out DVT clinically low suspicion  Elevated PSA he needs to follow-up with Dr. Blackman Fitting advised the patient he will make his own appointment  Continue B12 injection which is really helping  Low-cholesterol low-fat diet  Follow with a gastroenterologist for Gómez's esophagus  Patient makes his own appointment  Has a physical scheduled in February keep that appointment    Return if symptoms worsen or fail to improve.        Daniela Knight MD  10:14 AM  12/9/2020     [place]

## 2021-01-28 LAB — PROSTATE SPECIFIC ANTIGEN: 6.74 NG/ML (ref 0–4)

## 2021-02-02 ENCOUNTER — IMMUNIZATION (OUTPATIENT)
Dept: PRIMARY CARE CLINIC | Age: 76
End: 2021-02-02
Payer: MEDICARE

## 2021-02-02 PROCEDURE — 0001A COVID-19, PFIZER VACCINE 30MCG/0.3ML DOSE: CPT | Performed by: NURSE PRACTITIONER

## 2021-02-02 PROCEDURE — 91300 COVID-19, PFIZER VACCINE 30MCG/0.3ML DOSE: CPT | Performed by: NURSE PRACTITIONER

## 2021-02-16 ENCOUNTER — OFFICE VISIT (OUTPATIENT)
Dept: FAMILY MEDICINE CLINIC | Age: 76
End: 2021-02-16
Payer: MEDICARE

## 2021-02-16 VITALS
OXYGEN SATURATION: 97 % | BODY MASS INDEX: 32.34 KG/M2 | DIASTOLIC BLOOD PRESSURE: 70 MMHG | HEART RATE: 64 BPM | WEIGHT: 219 LBS | TEMPERATURE: 96.5 F | SYSTOLIC BLOOD PRESSURE: 120 MMHG

## 2021-02-16 DIAGNOSIS — K22.70 BARRETT'S ESOPHAGUS WITHOUT DYSPLASIA: ICD-10-CM

## 2021-02-16 DIAGNOSIS — Z00.00 ANNUAL PHYSICAL EXAM: Primary | ICD-10-CM

## 2021-02-16 DIAGNOSIS — R97.20 ELEVATED PSA: ICD-10-CM

## 2021-02-16 DIAGNOSIS — R39.11 BENIGN PROSTATIC HYPERPLASIA WITH URINARY HESITANCY: ICD-10-CM

## 2021-02-16 DIAGNOSIS — N40.1 BENIGN PROSTATIC HYPERPLASIA WITH URINARY HESITANCY: ICD-10-CM

## 2021-02-16 DIAGNOSIS — D51.0 PERNICIOUS ANEMIA: ICD-10-CM

## 2021-02-16 DIAGNOSIS — K21.9 GASTROESOPHAGEAL REFLUX DISEASE WITHOUT ESOPHAGITIS: ICD-10-CM

## 2021-02-16 LAB
BILIRUBIN, POC: NORMAL
BLOOD URINE, POC: NORMAL
CLARITY, POC: NORMAL
COLOR, POC: NORMAL
GLUCOSE URINE, POC: NORMAL
KETONES, POC: NORMAL
LEUKOCYTE EST, POC: NORMAL
NITRITE, POC: NORMAL
PH, POC: 7
PROTEIN, POC: NORMAL
SPECIFIC GRAVITY, POC: 1.01
UROBILINOGEN, POC: 0.2

## 2021-02-16 PROCEDURE — 99397 PER PM REEVAL EST PAT 65+ YR: CPT | Performed by: INTERNAL MEDICINE

## 2021-02-16 PROCEDURE — 93000 ELECTROCARDIOGRAM COMPLETE: CPT | Performed by: INTERNAL MEDICINE

## 2021-02-16 PROCEDURE — G8484 FLU IMMUNIZE NO ADMIN: HCPCS | Performed by: INTERNAL MEDICINE

## 2021-02-16 PROCEDURE — 81002 URINALYSIS NONAUTO W/O SCOPE: CPT | Performed by: INTERNAL MEDICINE

## 2021-02-16 PROCEDURE — 96372 THER/PROPH/DIAG INJ SC/IM: CPT | Performed by: INTERNAL MEDICINE

## 2021-02-16 RX ORDER — ACETAMINOPHEN 160 MG
TABLET,DISINTEGRATING ORAL
COMMUNITY

## 2021-02-16 RX ORDER — CYANOCOBALAMIN 1000 UG/ML
1000 INJECTION INTRAMUSCULAR; SUBCUTANEOUS ONCE
Status: COMPLETED | OUTPATIENT
Start: 2021-02-16 | End: 2021-02-16

## 2021-02-16 RX ADMIN — CYANOCOBALAMIN 1000 MCG: 1000 INJECTION INTRAMUSCULAR; SUBCUTANEOUS at 15:44

## 2021-02-16 ASSESSMENT — PATIENT HEALTH QUESTIONNAIRE - PHQ9
2. FEELING DOWN, DEPRESSED OR HOPELESS: 0
1. LITTLE INTEREST OR PLEASURE IN DOING THINGS: 0
SUM OF ALL RESPONSES TO PHQ9 QUESTIONS 1 & 2: 0

## 2021-02-16 NOTE — PROGRESS NOTES
anxiety   Rectal exam prostate enlarged stool Hemoccult negative    Current Outpatient Medications   Medication Sig Dispense Refill    Cholecalciferol (VITAMIN D3) 50 MCG (2000 UT) CAPS Take by mouth      pantoprazole (PROTONIX) 40 MG tablet Take 1 tablet by mouth every evening 90 tablet 3    Cyanocobalamin (B-12) 2500 MCG TABS Take 2,500 mg by mouth daily      Multiple Vitamins-Minerals (THERAPEUTIC MULTIVITAMIN-MINERALS) tablet Take 1 tablet by mouth daily      KRILL OIL PO Take 1 capsule by mouth daily      calcium carbonate (TUMS) 500 MG chewable tablet Take 2 tablets by mouth 3 times daily as needed for Heartburn       No current facility-administered medications for this visit.          Last 3 BMP  Lab Results   Component Value Date/Time     10/08/2020 09:14 AM     02/25/2020 08:34 AM     06/28/2017 09:20 AM    K 5.2 (H) 10/08/2020 09:14 AM    K 4.4 02/25/2020 08:34 AM    K 4.3 12/30/2019    K 4.5 06/28/2017 09:20 AM    K 4.5 09/12/2016 08:47 AM     10/08/2020 09:14 AM     02/25/2020 08:34 AM     06/28/2017 09:20 AM    CO2 27 10/08/2020 09:14 AM    CO2 25 02/25/2020 08:34 AM    CO2 26 06/28/2017 09:20 AM    BUN 18 10/08/2020 09:14 AM    BUN 19 02/25/2020 08:34 AM    BUN 16 06/28/2017 09:20 AM    CREATININE 1.2 10/08/2020 09:14 AM    CREATININE 1.1 02/25/2020 08:34 AM    CREATININE 1.2 12/30/2019    CREATININE 1.1 06/28/2017 09:20 AM    GLUCOSE 102 (H) 02/25/2020 08:34 AM    GLUCOSE 121 (H) 06/28/2017 09:20 AM    GLUCOSE 88 09/12/2016 08:47 AM    GLUCOSE 102 02/24/2012 08:38 AM    GLUCOSE 94 11/08/2010 08:48 AM    CALCIUM 9.6 10/08/2020 09:14 AM    CALCIUM 9.2 02/25/2020 08:34 AM    CALCIUM 9.3 06/28/2017 09:20 AM       Last 3 CMP:    Lab Results   Component Value Date/Time     10/08/2020 09:14 AM     02/25/2020 08:34 AM     06/28/2017 09:20 AM    K 5.2 (H) 10/08/2020 09:14 AM    K 4.4 02/25/2020 08:34 AM    K 4.3 12/30/2019    K 4.5 06/28/2017 09:20 AM K 4.5 09/12/2016 08:47 AM     10/08/2020 09:14 AM     02/25/2020 08:34 AM     06/28/2017 09:20 AM    CO2 27 10/08/2020 09:14 AM    CO2 25 02/25/2020 08:34 AM    CO2 26 06/28/2017 09:20 AM    BUN 18 10/08/2020 09:14 AM    BUN 19 02/25/2020 08:34 AM    BUN 16 06/28/2017 09:20 AM    CREATININE 1.2 10/08/2020 09:14 AM    CREATININE 1.1 02/25/2020 08:34 AM    CREATININE 1.2 12/30/2019    CREATININE 1.1 06/28/2017 09:20 AM    GLUCOSE 102 (H) 02/25/2020 08:34 AM    GLUCOSE 121 (H) 06/28/2017 09:20 AM    GLUCOSE 88 09/12/2016 08:47 AM    GLUCOSE 102 02/24/2012 08:38 AM    GLUCOSE 94 11/08/2010 08:48 AM    CALCIUM 9.6 10/08/2020 09:14 AM    CALCIUM 9.2 02/25/2020 08:34 AM    CALCIUM 9.3 06/28/2017 09:20 AM    PROT 7.3 10/08/2020 09:14 AM    PROT 7.4 02/25/2020 08:34 AM    PROT 7.5 09/12/2016 08:47 AM    LABALBU 4.1 10/08/2020 09:14 AM    LABALBU 4.1 02/25/2020 08:34 AM    LABALBU 4.0 09/12/2016 08:47 AM    LABALBU 4.1 02/24/2012 08:38 AM    LABALBU 4.3 11/08/2010 08:48 AM    BILITOT 0.9 10/08/2020 09:14 AM    BILITOT 0.8 02/25/2020 08:34 AM    BILITOT 0.7 09/12/2016 08:47 AM    ALKPHOS 62 10/08/2020 09:14 AM    ALKPHOS 93 02/25/2020 08:34 AM    ALKPHOS 81 09/12/2016 08:47 AM    AST 18 10/08/2020 09:14 AM    AST 41 (H) 02/25/2020 08:34 AM    AST 20 09/12/2016 08:47 AM    ALT 19 10/08/2020 09:14 AM    ALT 61 (H) 02/25/2020 08:34 AM    ALT 19 09/12/2016 08:47 AM        CBC:   Lab Results   Component Value Date/Time    WBC 4.9 10/08/2020 09:14 AM    RBC 4.49 10/08/2020 09:14 AM    HGB 14.6 10/08/2020 09:14 AM    HCT 42.8 10/08/2020 09:14 AM    MCV 95.3 10/08/2020 09:14 AM    MCH 32.5 10/08/2020 09:14 AM    MCHC 34.1 10/08/2020 09:14 AM    RDW 13.5 10/08/2020 09:14 AM     10/08/2020 09:14 AM    MPV 9.6 10/08/2020 09:14 AM       A1C:  Lab Results   Component Value Date/Time    LABA1C 5.0 10/08/2020 09:14 AM       Lipid panel:  Lab Results   Component Value Date    CHOL 167 12/30/2019    CHOL 175 09/12/2016    CHOL 180 04/29/2015    TRIG 134 12/30/2019    TRIG 142 09/12/2016    TRIG 156 04/29/2015    HDL 44 10/08/2020    HDL 36 12/30/2019    HDL 32 09/12/2016        Lab Results   Component Value Date/Time    PROT 7.3 10/08/2020 09:14 AM    PROT 7.4 02/25/2020 08:34 AM    PROT 7.5 09/12/2016 08:47 AM       No results found for: MG      Assessment. Ishaan Davison was seen today for annual exam.    Diagnoses and all orders for this visit:    Annual physical exam  -     EKG 12 Lead; Future  -     POCT Urinalysis no Micro  -     EKG 12 Lead    Gastroesophageal reflux disease without esophagitis    Pernicious anemia    Benign prostatic hyperplasia with urinary hesitancy    Gómez's esophagus without dysplasia    Elevated PSA    Other orders  -     cyanocobalamin injection 1,000 mcg       Patient Active Problem List   Diagnosis    Hemorrhoid thrombosis       Plan: His GE reflux disease is better still having is doing better denies any dysphagia had endoscopy March 2019    Continue Protonix 40 daily    He has pernicious anemia doing better with B12 injection and B12 tablets last level was okay he has seen quite a lot of improvement with B12 replacement    His PSA elevated and has enlarged prostate going for biopsy with Dr. Srikanth Myles    Low-cholesterol low-fat diet    He was seen for pain in the leg left leg ultrasound was negative for DVT the pain has resolved    Vaccination discussed            Return in about 6 months (around 8/16/2021).        Alma Cheatham MD  4:35 PM  2/16/2021     [place]

## 2021-02-19 LAB
BILIRUBIN, URINE: NORMAL
BLOOD, URINE: NORMAL
CLARITY: NORMAL
COLOR: NORMAL
GLUCOSE URINE: NORMAL
KETONES, URINE: NORMAL
LEUKOCYTE ESTERASE, URINE: NORMAL
NITRITE, URINE: NORMAL
PH UA: NORMAL
PROTEIN UA: NORMAL
SPECIFIC GRAVITY, URINE: NORMAL
UROBILINOGEN, URINE: NORMAL

## 2021-02-23 ENCOUNTER — IMMUNIZATION (OUTPATIENT)
Dept: PRIMARY CARE CLINIC | Age: 76
End: 2021-02-23
Payer: MEDICARE

## 2021-02-23 PROCEDURE — 91300 COVID-19, PFIZER VACCINE 30MCG/0.3ML DOSE: CPT | Performed by: NURSE PRACTITIONER

## 2021-02-23 PROCEDURE — 0002A COVID-19, PFIZER VACCINE 30MCG/0.3ML DOSE: CPT | Performed by: NURSE PRACTITIONER

## 2021-02-28 LAB — SARS-COV-2: NORMAL

## 2021-04-19 ENCOUNTER — NURSE ONLY (OUTPATIENT)
Dept: FAMILY MEDICINE CLINIC | Age: 76
End: 2021-04-19
Payer: MEDICARE

## 2021-04-19 DIAGNOSIS — D51.0 PERNICIOUS ANEMIA: Primary | ICD-10-CM

## 2021-04-19 PROCEDURE — 96372 THER/PROPH/DIAG INJ SC/IM: CPT | Performed by: INTERNAL MEDICINE

## 2021-04-19 RX ORDER — CYANOCOBALAMIN 1000 UG/ML
1000 INJECTION INTRAMUSCULAR; SUBCUTANEOUS ONCE
Status: COMPLETED | OUTPATIENT
Start: 2021-04-19 | End: 2021-04-19

## 2021-04-19 RX ADMIN — CYANOCOBALAMIN 1000 MCG: 1000 INJECTION INTRAMUSCULAR; SUBCUTANEOUS at 10:51

## 2021-07-13 ENCOUNTER — NURSE ONLY (OUTPATIENT)
Dept: FAMILY MEDICINE CLINIC | Age: 76
End: 2021-07-13
Payer: MEDICARE

## 2021-07-13 DIAGNOSIS — D51.0 PERNICIOUS ANEMIA: Primary | ICD-10-CM

## 2021-07-13 PROCEDURE — 96372 THER/PROPH/DIAG INJ SC/IM: CPT | Performed by: INTERNAL MEDICINE

## 2021-07-13 RX ORDER — CYANOCOBALAMIN 1000 UG/ML
1000 INJECTION INTRAMUSCULAR; SUBCUTANEOUS ONCE
Status: COMPLETED | OUTPATIENT
Start: 2021-07-13 | End: 2021-07-13

## 2021-07-13 RX ADMIN — CYANOCOBALAMIN 1000 MCG: 1000 INJECTION INTRAMUSCULAR; SUBCUTANEOUS at 13:41

## 2021-08-04 ENCOUNTER — OFFICE VISIT (OUTPATIENT)
Dept: FAMILY MEDICINE CLINIC | Age: 76
End: 2021-08-04
Payer: MEDICARE

## 2021-08-04 VITALS
DIASTOLIC BLOOD PRESSURE: 70 MMHG | OXYGEN SATURATION: 97 % | TEMPERATURE: 97.5 F | HEART RATE: 73 BPM | WEIGHT: 212 LBS | SYSTOLIC BLOOD PRESSURE: 120 MMHG | BODY MASS INDEX: 31.31 KG/M2

## 2021-08-04 DIAGNOSIS — C61 PROSTATE CANCER (HCC): ICD-10-CM

## 2021-08-04 DIAGNOSIS — R53.83 OTHER FATIGUE: ICD-10-CM

## 2021-08-04 DIAGNOSIS — D51.0 PERNICIOUS ANEMIA: ICD-10-CM

## 2021-08-04 DIAGNOSIS — E78.5 HYPERLIPIDEMIA, UNSPECIFIED HYPERLIPIDEMIA TYPE: ICD-10-CM

## 2021-08-04 DIAGNOSIS — J01.90 ACUTE SINUSITIS, RECURRENCE NOT SPECIFIED, UNSPECIFIED LOCATION: Primary | ICD-10-CM

## 2021-08-04 PROCEDURE — 1036F TOBACCO NON-USER: CPT | Performed by: INTERNAL MEDICINE

## 2021-08-04 PROCEDURE — G8417 CALC BMI ABV UP PARAM F/U: HCPCS | Performed by: INTERNAL MEDICINE

## 2021-08-04 PROCEDURE — 99213 OFFICE O/P EST LOW 20 MIN: CPT | Performed by: INTERNAL MEDICINE

## 2021-08-04 PROCEDURE — 4040F PNEUMOC VAC/ADMIN/RCVD: CPT | Performed by: INTERNAL MEDICINE

## 2021-08-04 PROCEDURE — G8427 DOCREV CUR MEDS BY ELIG CLIN: HCPCS | Performed by: INTERNAL MEDICINE

## 2021-08-04 PROCEDURE — 1123F ACP DISCUSS/DSCN MKR DOCD: CPT | Performed by: INTERNAL MEDICINE

## 2021-08-04 RX ORDER — FLUTICASONE PROPIONATE 50 MCG
2 SPRAY, SUSPENSION (ML) NASAL DAILY
Qty: 1 BOTTLE | Refills: 5 | Status: SHIPPED | OUTPATIENT
Start: 2021-08-04 | End: 2022-07-25

## 2021-08-04 RX ORDER — AZITHROMYCIN 250 MG/1
250 TABLET, FILM COATED ORAL SEE ADMIN INSTRUCTIONS
Qty: 6 TABLET | Refills: 0 | Status: SHIPPED | OUTPATIENT
Start: 2021-08-04 | End: 2021-08-09

## 2021-08-04 RX ORDER — TAMSULOSIN HYDROCHLORIDE 0.4 MG/1
CAPSULE ORAL
COMMUNITY
Start: 2021-07-11 | End: 2021-12-08 | Stop reason: SDUPTHER

## 2021-08-04 SDOH — ECONOMIC STABILITY: FOOD INSECURITY: WITHIN THE PAST 12 MONTHS, THE FOOD YOU BOUGHT JUST DIDN'T LAST AND YOU DIDN'T HAVE MONEY TO GET MORE.: NEVER TRUE

## 2021-08-04 SDOH — ECONOMIC STABILITY: FOOD INSECURITY: WITHIN THE PAST 12 MONTHS, YOU WORRIED THAT YOUR FOOD WOULD RUN OUT BEFORE YOU GOT MONEY TO BUY MORE.: NEVER TRUE

## 2021-08-04 ASSESSMENT — SOCIAL DETERMINANTS OF HEALTH (SDOH): HOW HARD IS IT FOR YOU TO PAY FOR THE VERY BASICS LIKE FOOD, HOUSING, MEDICAL CARE, AND HEATING?: NOT HARD AT ALL

## 2021-08-04 NOTE — PROGRESS NOTES
Subjective:     Chief Complaint   Patient presents with    Otalgia     right    Pharyngitis   Complains of pain in the right ear, right TM joint, throat hurts on the right side, for the past 1 week  Denies any cough denies any fever chills no nausea vomiting    Recently diagnosed with prostate cancer he had seed implant in [email or web address] at [facility]  He saw Dr. Faiza Vera    Past Medical History:   Diagnosis Date    Acid reflux     Barrette esophagus     Gómez esophagus     BPH (benign prostatic hyperplasia)     Pernicious anemia         Social History     Socioeconomic History    Marital status:      Spouse name: Not on file    Number of children: Not on file    Years of education: Not on file    Highest education level: Not on file   Occupational History    Not on file   Tobacco Use    Smoking status: Former Smoker     Packs/day: 1.00     Years: 5.00     Pack years: 5.00     Quit date: 1986     Years since quittin.6    Smokeless tobacco: Never Used   Substance and Sexual Activity    Alcohol use: No    Drug use: No    Sexual activity: Not on file   Other Topics Concern    Not on file   Social History Narrative    Not on file     Social Determinants of Health     Financial Resource Strain: Low Risk     Difficulty of Paying Living Expenses: Not hard at all   Food Insecurity: No Food Insecurity    Worried About [address] in the Last Year: Never true    [address] in the Last Year: Never true   Transportation Needs:     Lack of Transportation (Medical):      Lack of Transportation (Non-Medical):    Physical Activity:     Days of Exercise per Week:     Minutes of Exercise per Session:    Stress:     Feeling of Stress :    Social Connections:     Frequency of Communication with Friends and Family:     Frequency of Social Gatherings with Friends and Family:     Attends [employer] Services:     Active Member of Clubs or Organizations:     Attends Club or Organization Meetings:     Marital Status:    Intimate Partner Violence:     Fear of Current or Ex-Partner:     Emotionally Abused:     Physically Abused:     Sexually Abused:         Past Surgical History:   Procedure Laterality Date    APPENDECTOMY      CARPAL TUNNEL RELEASE Right     COLONOSCOPY  11/02/2016    KNEE SURGERY Right     TONSILLECTOMY          Family History   Problem Relation Age of Onset    High Blood Pressure Mother     Uterine Cancer Mother     Dementia Mother     Atrial Fibrillation Mother     Coronary Art Dis Mother     High Blood Pressure Father     Other Father         myelodysplatic syndrome         No Known Allergies     ROS  No acute distress  Cardiac: Denies any chest pain or palpitation  Very active walks long distance stress test in [place] clinic in 2018 -ve  Respiratory: Denies any cough or shortness of breath  GI: No abdominal pain. Denies any nausea vomiting or diarrhea  GE reflux, Gómez's esophagus, endoscopy and colonoscopy March 2019 with Dr. Maia Spring denies any dysphagia  : Denies any dysuria frequency or hematuria  Recent diagnosis of prostate cancer Eagle score 7 seed implant  Neuro: No headache or dizziness  Endocrine: No diabetes  Skin: normal  No recent weight gain or weight loss  Denies any change in vision    Objective:    /70   Pulse 73   Temp 97.5 °F (36.4 °C)   Wt 212 lb (96.2 kg)   SpO2 97%   BMI 31.31 kg/m²     Constitutional: Alert awake and oriented  Eyes: Pupils equal bilaterally. Extraocular muscles intact  Neck: no JVD adenopathy no bruit  Heart:  RRR, no murmurs, gallops, or rubs.   Lungs:    no wheeze, rales or rhonchi  Abd: bowel sounds present, nontender, nondistended, no masses  Extrem:  No clubbing, cyanosis, or edema  Neuro: AAOx3,No Focal deficit  Psychological: no depression or anxiety   Right ear exam normal drum normal  Neck no adenopathy  Throat erythema right side    Current Outpatient Medications   Medication Sig Dispense Refill    azithromycin (ZITHROMAX) 250 MG tablet Take 1 tablet by mouth See Admin Instructions for 5 days 500mg on day 1 followed by 250mg on days 2 - 5 6 tablet 0    fluticasone (FLONASE) 50 MCG/ACT nasal spray 2 sprays by Each Nostril route daily 1 Bottle 5    tamsulosin (FLOMAX) 0.4 MG capsule TAKE TWO CAPSULES BY MOUTH DAILY AT BEDTIME      Cholecalciferol (VITAMIN D3) 50 MCG (2000 UT) CAPS Take by mouth      pantoprazole (PROTONIX) 40 MG tablet Take 1 tablet by mouth every evening 90 tablet 3    Cyanocobalamin (B-12) 2500 MCG TABS Take 2,500 mg by mouth daily      Multiple Vitamins-Minerals (THERAPEUTIC MULTIVITAMIN-MINERALS) tablet Take 1 tablet by mouth daily      KRILL OIL PO Take 1 capsule by mouth daily      calcium carbonate (TUMS) 500 MG chewable tablet Take 2 tablets by mouth 3 times daily as needed for Heartburn       No current facility-administered medications for this visit.         Last 3 BMP  Lab Results   Component Value Date/Time     10/08/2020 09:14 AM     02/25/2020 08:34 AM     06/28/2017 09:20 AM    K 5.2 (H) 10/08/2020 09:14 AM    K 4.4 02/25/2020 08:34 AM    K 4.3 12/30/2019 12:00 AM    K 4.5 06/28/2017 09:20 AM    K 4.5 09/12/2016 08:47 AM     10/08/2020 09:14 AM     02/25/2020 08:34 AM     06/28/2017 09:20 AM    CO2 27 10/08/2020 09:14 AM    CO2 25 02/25/2020 08:34 AM    CO2 26 06/28/2017 09:20 AM    BUN 18 10/08/2020 09:14 AM    BUN 19 02/25/2020 08:34 AM    BUN 16 06/28/2017 09:20 AM    CREATININE 1.2 10/08/2020 09:14 AM    CREATININE 1.1 02/25/2020 08:34 AM    CREATININE 1.2 12/30/2019 12:00 AM    CREATININE 1.1 06/28/2017 09:20 AM    GLUCOSE 102 (H) 02/25/2020 08:34 AM    GLUCOSE 121 (H) 06/28/2017 09:20 AM    GLUCOSE 88 09/12/2016 08:47 AM    GLUCOSE 102 02/24/2012 08:38 AM    GLUCOSE 94 11/08/2010 08:48 AM    CALCIUM 9.6 10/08/2020 09:14 AM    CALCIUM 9.2 02/25/2020 08:34 AM    CALCIUM 9.3 06/28/2017 09:20 AM       Last 3 CMP:    Lab Results   Component Value Date/Time     10/08/2020 09:14 AM     02/25/2020 08:34 AM     06/28/2017 09:20 AM    K 5.2 (H) 10/08/2020 09:14 AM    K 4.4 02/25/2020 08:34 AM    K 4.3 12/30/2019 12:00 AM    K 4.5 06/28/2017 09:20 AM    K 4.5 09/12/2016 08:47 AM     10/08/2020 09:14 AM     02/25/2020 08:34 AM     06/28/2017 09:20 AM    CO2 27 10/08/2020 09:14 AM    CO2 25 02/25/2020 08:34 AM    CO2 26 06/28/2017 09:20 AM    BUN 18 10/08/2020 09:14 AM    BUN 19 02/25/2020 08:34 AM    BUN 16 06/28/2017 09:20 AM    CREATININE 1.2 10/08/2020 09:14 AM    CREATININE 1.1 02/25/2020 08:34 AM    CREATININE 1.2 12/30/2019 12:00 AM    CREATININE 1.1 06/28/2017 09:20 AM    GLUCOSE 102 (H) 02/25/2020 08:34 AM    GLUCOSE 121 (H) 06/28/2017 09:20 AM    GLUCOSE 88 09/12/2016 08:47 AM    GLUCOSE 102 02/24/2012 08:38 AM    GLUCOSE 94 11/08/2010 08:48 AM    CALCIUM 9.6 10/08/2020 09:14 AM    CALCIUM 9.2 02/25/2020 08:34 AM    CALCIUM 9.3 06/28/2017 09:20 AM    PROT 7.3 10/08/2020 09:14 AM    PROT 7.4 02/25/2020 08:34 AM    PROT 7.5 09/12/2016 08:47 AM    LABALBU 4.1 10/08/2020 09:14 AM    LABALBU 4.1 02/25/2020 08:34 AM    LABALBU 4.0 09/12/2016 08:47 AM    LABALBU 4.1 02/24/2012 08:38 AM    LABALBU 4.3 11/08/2010 08:48 AM    BILITOT 0.9 10/08/2020 09:14 AM    BILITOT 0.8 02/25/2020 08:34 AM    BILITOT 0.7 09/12/2016 08:47 AM    ALKPHOS 62 10/08/2020 09:14 AM    ALKPHOS 93 02/25/2020 08:34 AM    ALKPHOS 81 09/12/2016 08:47 AM    AST 18 10/08/2020 09:14 AM    AST 41 (H) 02/25/2020 08:34 AM    AST 20 09/12/2016 08:47 AM    ALT 19 10/08/2020 09:14 AM    ALT 61 (H) 02/25/2020 08:34 AM    ALT 19 09/12/2016 08:47 AM        CBC:   Lab Results   Component Value Date/Time    WBC 4.9 10/08/2020 09:14 AM    RBC 4.49 10/08/2020 09:14 AM    HGB 14.6 10/08/2020 09:14 AM    HCT 42.8 10/08/2020 09:14 AM    MCV 95.3 10/08/2020 09:14 AM    MCH 32.5 10/08/2020 09:14 AM    MCHC 34.1 10/08/2020 09:14 AM    RDW 13.5 10/08/2020 09:14 AM     10/08/2020 09:14 AM    MPV 9.6 10/08/2020 09:14 AM       A1C:  Lab Results   Component Value Date/Time    LABA1C 5.0 10/08/2020 09:14 AM       Lipid panel:  Lab Results   Component Value Date    CHOL 167 12/30/2019    CHOL 175 09/12/2016    CHOL 180 04/29/2015    TRIG 134 12/30/2019    TRIG 142 09/12/2016    TRIG 156 04/29/2015    HDL 44 10/08/2020    HDL 36 12/30/2019    HDL 32 09/12/2016        Lab Results   Component Value Date/Time    PROT 7.3 10/08/2020 09:14 AM    PROT 7.4 02/25/2020 08:34 AM    PROT 7.5 09/12/2016 08:47 AM       No results found for: MG      Assessment. Sarah Narayan was seen today for otalgia and pharyngitis. Diagnoses and all orders for this visit:    Acute sinusitis, recurrence not specified, unspecified location  -     azithromycin (ZITHROMAX) 250 MG tablet; Take 1 tablet by mouth See Admin Instructions for 5 days 500mg on day 1 followed by 250mg on days 2 - 5    Hyperlipidemia, unspecified hyperlipidemia type  -     COMPREHENSIVE METABOLIC PANEL; Future  -     LIPID PANEL; Future  -     TSH; Future    Pernicious anemia  -     VITAMIN B12 & FOLATE; Future    Other fatigue  -     TSH; Future    Other orders  -     fluticasone (FLONASE) 50 MCG/ACT nasal spray; 2 sprays by Each Nostril route daily       Patient Active Problem List   Diagnosis    Hemorrhoid thrombosis       Plan: Sinusitis [employer] as directed Flonase no spray    GE reflux continue Protonix 40 daily    Right ear pain referred pain possible TMJ Advil as needed    If no improvement in 2 weeks consider ENT evaluation    Pernicious anemia continue B12 check B12 level    New onset of prostate cancer status post seed implant he will follow at [facility] he was seen by Dr. Suzi Mccarthy    No follow-ups on file.        Elena Tapia MD  2:53 PM  8/4/2021     [place]

## 2021-08-24 ENCOUNTER — OFFICE VISIT (OUTPATIENT)
Dept: FAMILY MEDICINE CLINIC | Age: 76
End: 2021-08-24
Payer: MEDICARE

## 2021-08-24 VITALS
WEIGHT: 214 LBS | OXYGEN SATURATION: 96 % | TEMPERATURE: 77.3 F | BODY MASS INDEX: 31.6 KG/M2 | SYSTOLIC BLOOD PRESSURE: 112 MMHG | HEART RATE: 71 BPM | DIASTOLIC BLOOD PRESSURE: 70 MMHG

## 2021-08-24 DIAGNOSIS — C61 PROSTATE CANCER (HCC): ICD-10-CM

## 2021-08-24 DIAGNOSIS — D51.0 PERNICIOUS ANEMIA: ICD-10-CM

## 2021-08-24 DIAGNOSIS — J01.90 ACUTE SINUSITIS, RECURRENCE NOT SPECIFIED, UNSPECIFIED LOCATION: Primary | ICD-10-CM

## 2021-08-24 DIAGNOSIS — K22.70 BARRETT'S ESOPHAGUS WITHOUT DYSPLASIA: ICD-10-CM

## 2021-08-24 DIAGNOSIS — K21.9 GASTROESOPHAGEAL REFLUX DISEASE WITHOUT ESOPHAGITIS: ICD-10-CM

## 2021-08-24 PROCEDURE — 99213 OFFICE O/P EST LOW 20 MIN: CPT | Performed by: INTERNAL MEDICINE

## 2021-08-24 PROCEDURE — 1123F ACP DISCUSS/DSCN MKR DOCD: CPT | Performed by: INTERNAL MEDICINE

## 2021-08-24 PROCEDURE — G8427 DOCREV CUR MEDS BY ELIG CLIN: HCPCS | Performed by: INTERNAL MEDICINE

## 2021-08-24 PROCEDURE — G8417 CALC BMI ABV UP PARAM F/U: HCPCS | Performed by: INTERNAL MEDICINE

## 2021-08-24 PROCEDURE — 1036F TOBACCO NON-USER: CPT | Performed by: INTERNAL MEDICINE

## 2021-08-24 PROCEDURE — 4040F PNEUMOC VAC/ADMIN/RCVD: CPT | Performed by: INTERNAL MEDICINE

## 2021-08-24 NOTE — PROGRESS NOTES
Subjective:     Chief Complaint   Patient presents with   Norma Gagnon        Past Medical History:   Diagnosis Date    Acid reflux     Barrette esophagus     Gómez esophagus     BPH (benign prostatic hyperplasia)     Pernicious anemia         Social History     Socioeconomic History    Marital status:      Spouse name: Not on file    Number of children: Not on file    Years of education: Not on file    Highest education level: Not on file   Occupational History    Not on file   Tobacco Use    Smoking status: Former Smoker     Packs/day: 1.00     Years: 5.00     Pack years: 5.00     Quit date: 1986     Years since quittin.7    Smokeless tobacco: Never Used   Substance and Sexual Activity    Alcohol use: No    Drug use: No    Sexual activity: Not on file   Other Topics Concern    Not on file   Social History Narrative    Not on file     Social Determinants of Health     Financial Resource Strain: Low Risk     Difficulty of Paying Living Expenses: Not hard at all   Food Insecurity: No Food Insecurity    Worried About [address] in the Last Year: Never true    [address] [employer] [facility] in the Last Year: Never true   Transportation Needs:     Lack of Transportation (Medical):      Lack of Transportation (Non-Medical):    Physical Activity:     Days of Exercise per Week:     Minutes of Exercise per Session:    Stress:     Feeling of Stress :    Social Connections:     Frequency of Communication with Friends and Family:     Frequency of Social Gatherings with Friends and Family:     Attends [employer] Services:     Active Member of Clubs or Organizations:     Attends Club or Organization Meetings:     Marital Status:    Intimate Partner Violence:     Fear of Current or Ex-Partner:     Emotionally Abused:     Physically Abused:     Sexually Abused:         Past Surgical History:   Procedure Laterality Date    APPENDECTOMY      CARPAL TUNNEL RELEASE Right     COLONOSCOPY 11/02/2016    KNEE SURGERY Right     TONSILLECTOMY          Family History   Problem Relation Age of Onset    High Blood Pressure Mother     Uterine Cancer Mother     Dementia Mother     Atrial Fibrillation Mother     Coronary Art Dis Mother     High Blood Pressure Father     Other Father         myelodysplatic syndrome         No Known Allergies     ROS  No acute distress  Cardiac: Denies any chest pain or palpitation  Physically active stress test in [facility] about 2 miles without any problem  Respiratory: Denies any cough or shortness of breath  GI: No abdominal pain. Denies any nausea vomiting or diarrhea  Colonoscopy 2019 with Dr. Carloz Rueda  History of GE reflux follows with the GI denies any dysphagia, Gómez's esophagus  : Denies any dysuria frequency or hematuria  Recently diagnosed with prostate cancer had seed implant  Neuro: No headache or dizziness  Endocrine: No diabetes  Skin: normal  No recent weight gain or weight loss  Denies any change in vision    Objective:    /70   Pulse 71   Temp (!) 77.3 °F (25.2 °C)   Wt 214 lb (97.1 kg)   SpO2 96%   BMI 31.60 kg/m²     Constitutional: Alert awake and oriented  Eyes: Pupils equal bilaterally. Extraocular muscles intact  Neck: no JVD adenopathy no bruit  Heart:  RRR, no murmurs, gallops, or rubs.   Lungs:    no wheeze, rales or rhonchi  Abd: bowel sounds present, nontender, nondistended, no masses  Extrem:  No clubbing, cyanosis, or edema  Neuro: AAOx3,No Focal deficit  Psychological: no depression or anxiety       Current Outpatient Medications   Medication Sig Dispense Refill    tamsulosin (FLOMAX) 0.4 MG capsule TAKE TWO CAPSULES BY MOUTH DAILY AT BEDTIME      fluticasone (FLONASE) 50 MCG/ACT nasal spray 2 sprays by Each Nostril route daily 1 Bottle 5    Cholecalciferol (VITAMIN D3) 50 MCG (2000 UT) CAPS Take by mouth      pantoprazole (PROTONIX) 40 MG tablet Take 1 tablet by mouth every evening 90 tablet 3    09:14 AM    BUN 19 02/25/2020 08:34 AM    CREATININE 1.1 08/17/2021 10:27 AM    CREATININE 1.2 10/08/2020 09:14 AM    CREATININE 1.1 02/25/2020 08:34 AM    CREATININE 1.2 12/30/2019 12:00 AM    GLUCOSE 92 08/17/2021 10:27 AM    GLUCOSE 102 (H) 02/25/2020 08:34 AM    GLUCOSE 121 (H) 06/28/2017 09:20 AM    GLUCOSE 102 02/24/2012 08:38 AM    GLUCOSE 94 11/08/2010 08:48 AM    CALCIUM 9.6 08/17/2021 10:27 AM    CALCIUM 9.6 10/08/2020 09:14 AM    CALCIUM 9.2 02/25/2020 08:34 AM    PROT 7.0 08/17/2021 10:27 AM    PROT 7.3 10/08/2020 09:14 AM    PROT 7.4 02/25/2020 08:34 AM    LABALBU 4.4 08/17/2021 10:27 AM    LABALBU 4.1 10/08/2020 09:14 AM    LABALBU 4.1 02/25/2020 08:34 AM    LABALBU 4.1 02/24/2012 08:38 AM    LABALBU 4.3 11/08/2010 08:48 AM    BILITOT 0.9 08/17/2021 10:27 AM    BILITOT 0.9 10/08/2020 09:14 AM    BILITOT 0.8 02/25/2020 08:34 AM    ALKPHOS 73 08/17/2021 10:27 AM    ALKPHOS 62 10/08/2020 09:14 AM    ALKPHOS 93 02/25/2020 08:34 AM    AST 34 08/17/2021 10:27 AM    AST 18 10/08/2020 09:14 AM    AST 41 (H) 02/25/2020 08:34 AM    ALT 28 08/17/2021 10:27 AM    ALT 19 10/08/2020 09:14 AM    ALT 61 (H) 02/25/2020 08:34 AM        CBC:   Lab Results   Component Value Date/Time    WBC 4.9 10/08/2020 09:14 AM    RBC 4.49 10/08/2020 09:14 AM    HGB 14.6 10/08/2020 09:14 AM    HCT 42.8 10/08/2020 09:14 AM    MCV 95.3 10/08/2020 09:14 AM    MCH 32.5 10/08/2020 09:14 AM    MCHC 34.1 10/08/2020 09:14 AM    RDW 13.5 10/08/2020 09:14 AM     10/08/2020 09:14 AM    MPV 9.6 10/08/2020 09:14 AM       A1C:  Lab Results   Component Value Date/Time    LABA1C 5.0 10/08/2020 09:14 AM       Lipid panel:  Lab Results   Component Value Date    CHOL 147 08/17/2021    CHOL 167 12/30/2019    CHOL 175 09/12/2016    TRIG 82 08/17/2021    TRIG 134 12/30/2019    TRIG 142 09/12/2016    HDL 42 08/17/2021    HDL 44 10/08/2020    HDL 36 12/30/2019        Lab Results   Component Value Date/Time    PROT 7.0 08/17/2021 10:27 AM    PROT 7.3 10/08/2020 09:14 AM    PROT 7.4 02/25/2020 08:34 AM       No results found for: MG      Assessment. Alexsandra Arellano was seen today for otalgia. Diagnoses and all orders for this visit:    Acute sinusitis, recurrence not specified, unspecified location    [facility]    Gastroesophageal reflux disease without esophagitis    Pernicious anemia    Gómez's esophagus without dysplasia       Patient Active Problem List   Diagnosis    Hemorrhoid thrombosis    [facility]    Pernicious anemia    Hyperlipidemia    Gastroesophageal reflux disease without esophagitis    Acute sinusitis       Plan: Sinusitis improving pain in the right ear is better continue Flonase no spray, Mucinex over-the-counter see ENT if not better    Prostate cancer he recently had a seed implant follow with the urologist    GE reflux disease with Gómez's esophagus no dysphagia continue Protonix    History of pernicious anemia continue B12 tablets    Hyperlipidemia low-cholesterol low-fat diet cholesterol 147, LDL 89        Return in about 6 months (around 2/24/2022).        Kristin Sutton MD  5:56 PM  8/24/2021     DE

## 2021-10-05 ENCOUNTER — TELEPHONE (OUTPATIENT)
Dept: FAMILY MEDICINE CLINIC | Age: 76
End: 2021-10-05

## 2021-10-05 ENCOUNTER — NURSE ONLY (OUTPATIENT)
Dept: FAMILY MEDICINE CLINIC | Age: 76
End: 2021-10-05
Payer: MEDICARE

## 2021-10-05 DIAGNOSIS — R05.9 COUGH: Primary | ICD-10-CM

## 2021-10-05 DIAGNOSIS — D51.0 PERNICIOUS ANEMIA: Primary | ICD-10-CM

## 2021-10-05 PROCEDURE — 96372 THER/PROPH/DIAG INJ SC/IM: CPT | Performed by: INTERNAL MEDICINE

## 2021-10-05 RX ORDER — PANTOPRAZOLE SODIUM 40 MG/1
40 TABLET, DELAYED RELEASE ORAL EVERY EVENING
Qty: 90 TABLET | Refills: 0 | Status: SHIPPED
Start: 2021-10-05 | End: 2021-11-30

## 2021-10-05 RX ORDER — CYANOCOBALAMIN 1000 UG/ML
1000 INJECTION INTRAMUSCULAR; SUBCUTANEOUS ONCE
Status: COMPLETED | OUTPATIENT
Start: 2021-10-05 | End: 2021-10-05

## 2021-10-05 RX ADMIN — CYANOCOBALAMIN 1000 MCG: 1000 INJECTION INTRAMUSCULAR; SUBCUTANEOUS at 13:19

## 2021-10-12 ENCOUNTER — HOSPITAL ENCOUNTER (OUTPATIENT)
Age: 76
Discharge: HOME | End: 2021-10-12
Payer: MEDICARE

## 2021-10-12 ENCOUNTER — TELEPHONE (OUTPATIENT)
Dept: FAMILY MEDICINE CLINIC | Age: 76
End: 2021-10-12

## 2021-10-12 ENCOUNTER — OFFICE VISIT (OUTPATIENT)
Dept: FAMILY MEDICINE CLINIC | Age: 76
End: 2021-10-12
Payer: MEDICARE

## 2021-10-12 ENCOUNTER — HOSPITAL ENCOUNTER (OUTPATIENT)
Dept: ULTRASOUND IMAGING | Age: 76
Discharge: HOME | End: 2021-10-12
Payer: MEDICARE

## 2021-10-12 VITALS
TEMPERATURE: 97.4 F | WEIGHT: 214 LBS | OXYGEN SATURATION: 98 % | BODY MASS INDEX: 31.7 KG/M2 | HEIGHT: 69 IN | SYSTOLIC BLOOD PRESSURE: 134 MMHG | RESPIRATION RATE: 17 BRPM | DIASTOLIC BLOOD PRESSURE: 83 MMHG | HEART RATE: 72 BPM

## 2021-10-12 DIAGNOSIS — M79.89 LEG SWELLING: ICD-10-CM

## 2021-10-12 DIAGNOSIS — L03.116 CELLULITIS OF LEFT LOWER EXTREMITY: ICD-10-CM

## 2021-10-12 DIAGNOSIS — M79.89 LEG SWELLING: Primary | ICD-10-CM

## 2021-10-12 LAB
ALBUMIN SERPL-MCNC: 4.2 G/DL (ref 3.5–5.2)
ALP BLD-CCNC: 79 U/L (ref 40–129)
ALT SERPL-CCNC: 25 U/L (ref 0–40)
ANION GAP SERPL CALCULATED.3IONS-SCNC: 6 MMOL/L (ref 7–16)
AST SERPL-CCNC: 23 U/L (ref 0–39)
BASOPHILS ABSOLUTE: 0.04 E9/L (ref 0–0.2)
BASOPHILS RELATIVE PERCENT: 0.8 % (ref 0–2)
BILIRUB SERPL-MCNC: 1 MG/DL (ref 0–1.2)
BUN BLDV-MCNC: 16 MG/DL (ref 6–23)
CALCIUM SERPL-MCNC: 9.3 MG/DL (ref 8.6–10.2)
CHLORIDE BLD-SCNC: 105 MMOL/L (ref 98–107)
CO2: 26 MMOL/L (ref 22–29)
CREAT SERPL-MCNC: 1.1 MG/DL (ref 0.7–1.2)
D DIMER: 315 NG/ML DDU
EOSINOPHILS ABSOLUTE: 0.16 E9/L (ref 0.05–0.5)
EOSINOPHILS RELATIVE PERCENT: 3.3 % (ref 0–6)
GFR AFRICAN AMERICAN: >60
GFR NON-AFRICAN AMERICAN: >60 ML/MIN/1.73
GLUCOSE BLD-MCNC: 85 MG/DL (ref 74–99)
HCT VFR BLD CALC: 40 % (ref 37–54)
HEMOGLOBIN: 13.7 G/DL (ref 12.5–16.5)
IMMATURE GRANULOCYTES #: 0.01 E9/L
IMMATURE GRANULOCYTES %: 0.2 % (ref 0–5)
LYMPHOCYTES ABSOLUTE: 1.09 E9/L (ref 1.5–4)
LYMPHOCYTES RELATIVE PERCENT: 22.7 % (ref 20–42)
MCH RBC QN AUTO: 32.2 PG (ref 26–35)
MCHC RBC AUTO-ENTMCNC: 34.3 % (ref 32–34.5)
MCV RBC AUTO: 93.9 FL (ref 80–99.9)
MONOCYTES ABSOLUTE: 0.55 E9/L (ref 0.1–0.95)
MONOCYTES RELATIVE PERCENT: 11.5 % (ref 2–12)
NEUTROPHILS ABSOLUTE: 2.95 E9/L (ref 1.8–7.3)
NEUTROPHILS RELATIVE PERCENT: 61.5 % (ref 43–80)
PDW BLD-RTO: 13.6 FL (ref 11.5–15)
PLATELET # BLD: 196 E9/L (ref 130–450)
PMV BLD AUTO: 10.1 FL (ref 7–12)
POTASSIUM SERPL-SCNC: 4.2 MMOL/L (ref 3.5–5)
RBC # BLD: 4.26 E12/L (ref 3.8–5.8)
SEDIMENTATION RATE, ERYTHROCYTE: 24 MM/HR (ref 0–15)
SODIUM BLD-SCNC: 137 MMOL/L (ref 132–146)
TOTAL PROTEIN: 7.4 G/DL (ref 6.4–8.3)
WBC # BLD: 4.8 E9/L (ref 4.5–11.5)

## 2021-10-12 PROCEDURE — 36415 COLL VENOUS BLD VENIPUNCTURE: CPT

## 2021-10-12 PROCEDURE — G8417 CALC BMI ABV UP PARAM F/U: HCPCS | Performed by: NURSE PRACTITIONER

## 2021-10-12 PROCEDURE — G8484 FLU IMMUNIZE NO ADMIN: HCPCS | Performed by: NURSE PRACTITIONER

## 2021-10-12 PROCEDURE — 1123F ACP DISCUSS/DSCN MKR DOCD: CPT | Performed by: NURSE PRACTITIONER

## 2021-10-12 PROCEDURE — 85651 RBC SED RATE NONAUTOMATED: CPT

## 2021-10-12 PROCEDURE — 1036F TOBACCO NON-USER: CPT | Performed by: NURSE PRACTITIONER

## 2021-10-12 PROCEDURE — 93971 EXTREMITY STUDY: CPT

## 2021-10-12 PROCEDURE — G8427 DOCREV CUR MEDS BY ELIG CLIN: HCPCS | Performed by: NURSE PRACTITIONER

## 2021-10-12 PROCEDURE — 4040F PNEUMOC VAC/ADMIN/RCVD: CPT | Performed by: NURSE PRACTITIONER

## 2021-10-12 PROCEDURE — 85378 FIBRIN DEGRADE SEMIQUANT: CPT

## 2021-10-12 PROCEDURE — 99204 OFFICE O/P NEW MOD 45 MIN: CPT | Performed by: NURSE PRACTITIONER

## 2021-10-12 PROCEDURE — 85025 COMPLETE CBC W/AUTO DIFF WBC: CPT

## 2021-10-12 PROCEDURE — 80053 COMPREHEN METABOLIC PANEL: CPT

## 2021-10-12 RX ORDER — DOXYCYCLINE HYCLATE 100 MG
100 TABLET ORAL 2 TIMES DAILY
Qty: 14 TABLET | Refills: 0 | Status: SHIPPED
Start: 2021-10-12 | End: 2021-11-26 | Stop reason: SDUPTHER

## 2021-10-12 RX ORDER — FUROSEMIDE 20 MG/1
20 TABLET ORAL DAILY
Qty: 3 TABLET | Refills: 0 | Status: SHIPPED
Start: 2021-10-12 | End: 2021-11-26

## 2021-10-12 NOTE — TELEPHONE ENCOUNTER
Not sure what appointments his PCP has open, but if this person's leg stays swollen, someone in his \"POD\" needs to f/u with pcp & not ready care. Can you please assist with this, thanks.

## 2021-10-12 NOTE — TELEPHONE ENCOUNTER
I spoke to patient patient had lung nodules in the past she had a CT chest January 2020, then December 2020, no change in multiple lung nodules, she needs another CT chest, she will discuss with the orthopedic surgeon, she has moved to [place] going to Saturday, advised to find a doctor there, she should get ultrasound abdominal aorta also, before the surgery I will discuss with her orthopedic surgeon Dr. Joshua Rubin

## 2021-10-12 NOTE — TELEPHONE ENCOUNTER
Done at 5:44 PM was for a different patient it was a dictation error ignored this message  This patient does not have any lung nodule  He does not need any follow-ups

## 2021-10-12 NOTE — TELEPHONE ENCOUNTER
Patient would like a call to discuss US results from 10/12/2021.      Last seen 8/24/2021  Next appt 12/8/2021

## 2021-10-12 NOTE — PROGRESS NOTES
Chief Complaint   Leg Swelling (left leg started last friday )    History of Present Illness   Source of history provided by:  patient. Dang Malave is a 66 y.o. old male presenting to the walk in clinic for evaluation of leg swelling to the left leg, which began 5 days ago. Denies injury to leg. Denies any known insect bite. He denies pain. He states he is able to ambulate without difficulties. Reports the area is mildly warm to touch. Denies lymphangitic streaking. Denies any bleeding or active drainage. Denies pruritis. He reports a patch of dry skin on the left calf that he has seen Dermatology for in the past. He puts lotion on biterally legs every morning and that is how he noticed the swelling. Since onset the symptoms have progressed. Denies any fever, chills, HA, recent illness, myalgias, nausea, vomiting, or lethargy. Pt has tried Aleve OTC without relief. Patient is going to [facility] on 10/20/21 and states he needed to come and get it checked out before he goes. ROS    Unless otherwise stated in this report or unable to obtain because of the patient's clinical or mental status as evidenced by the medical record, this patients's positive and negative responses for Review of Systems, constitutional, psych, eyes, ENT, cardiovascular, respiratory, gastrointestinal, neurological, genitourinary, musculoskeletal, integument systems and systems related to the presenting problem are either stated in the preceding or were not pertinent or were negative for the symptoms and/or complaints related to the medical problem. Past Medical History:  has a past medical history of Acid reflux, Gómez esophagus, BPH (benign prostatic hyperplasia), and Pernicious anemia. Past Surgical History:  has a past surgical history that includes Carpal tunnel release (Right); Appendectomy; knee surgery (Right); Tonsillectomy; and Colonoscopy (11/02/2016).   Social History:  reports that he quit smoking about 34 years ago. He has a 5.00 pack-year smoking history. He has never used smokeless tobacco. He reports that he does not drink alcohol and does not use drugs. Family History: family history includes Atrial Fibrillation in his mother; Coronary Art Dis in his mother; Dementia in his mother; High Blood Pressure in his father and mother; Other in his father; Uterine Cancer in his mother. Allergies: Patient has no known allergies. Physical Exam         VS:  /83 (Site: Left Upper Arm, Position: Sitting, Cuff Size: Medium Adult)   Pulse 72   Temp 97.4 °F (36.3 °C) (Temporal)   Resp 17   Ht 5' 9\" (1.753 m)   Wt 214 lb (97.1 kg)   SpO2 98%   BMI 31.60 kg/m²    Oxygen Saturation Interpretation: Normal.    Constitutional:  Alert, development consistent with age. NAD. Lungs:  CTAB without wheezing, rales, or rhonchi. Heart:  Regular rate and rhythm, no pathologic murmurs, rubs, or gallops. Skin:  Normal turgor and appropriately dry to touch. 2+ left leg edema from below knee down to foot. Warmth over the same area. No bleeding or drainage noted. No lymphangitic streaking. No fluctuance noted. Excessively dry patch to left posterior calf. Erythematous area to left medial calf. Neurological:  Orientation age-appropriate unless noted elseware. Motor functions intact. Musculoskeletal: Lionel's sign negative. No ambulation difficulties.        Assessment / Plan     Impression   1.  Left lower extremity negative for deep venous thrombosis.       2.  Probable mildly-complex left Baker cyst, as described.       3.  Incidental and indeterminate moderate left inguinal lymphadenopathy, as   described.       RECOMMENDATIONS:       1.  Impression 3: Given the reported history of prostate cancer, consider   histologic sampling and/or CT of the abdomen and pelvis, as directed   clinically.       Findings were sent to the [facility] Radiology Results [address]   at 1:18 pm on 10/12/2021 to be communicated to a licensed caregiver.       Attempts to contact the referring physician are currently in progress         Impression(s):  Jaymes Seip was seen today for leg swelling. Diagnoses and all orders for this visit:    Leg swelling  -     US DUP LOWER EXTREMITY LEFT DIMA; Future  -     CBC Auto Differential; Future  -     Comprehensive Metabolic Panel; Future  -     D-DIMER, QUANTITATIVE; Future  -     Sedimentation Rate; Future  -     furosemide (LASIX) 20 MG tablet; Take 1 tablet by mouth daily    Cellulitis of left lower extremity  -     doxycycline hyclate (VIBRA-TABS) 100 MG tablet; Take 1 tablet by mouth 2 times daily for 7 days    - F/u with PCP for continued care    Disposition:  Disposition: No US staff in house. Sent to [address] [place] [address],[address] for US left lower extremity to r/o DVT and blood work. Scripts written for medication to cover for possible cellulitis, side effects discussed. Return to clinic or f/u with PCP in 3 days for recheck. ED sooner if symptoms worsen or change. ED immediately with the development of fever, body aches, shaking chills, spreading erythema, lymphangitic streaking, lethargy, CP, or SOB. Pt is in agreement with this care plan. All questions answered. Amelia Quintana, APRN - CNP    **This report was transcribed using voice recognition software. Every effort was made to ensure accuracy; however, inadvertent computerized transcription errors may be present.

## 2021-10-12 NOTE — TELEPHONE ENCOUNTER
Patient called, would like a call to get his US results.  Thank you!!    [phone]    Pt tried to schedule f/u appt with PCP but nothing available until Dec.

## 2021-11-26 ENCOUNTER — OFFICE VISIT (OUTPATIENT)
Dept: FAMILY MEDICINE CLINIC | Age: 76
End: 2021-11-26
Payer: MEDICARE

## 2021-11-26 VITALS
BODY MASS INDEX: 31.84 KG/M2 | HEIGHT: 69 IN | OXYGEN SATURATION: 97 % | RESPIRATION RATE: 18 BRPM | SYSTOLIC BLOOD PRESSURE: 147 MMHG | DIASTOLIC BLOOD PRESSURE: 88 MMHG | TEMPERATURE: 97.4 F | WEIGHT: 215 LBS | HEART RATE: 71 BPM

## 2021-11-26 DIAGNOSIS — M79.89 PAIN AND SWELLING OF LEFT LOWER LEG: ICD-10-CM

## 2021-11-26 DIAGNOSIS — M79.662 PAIN AND SWELLING OF LEFT LOWER LEG: ICD-10-CM

## 2021-11-26 DIAGNOSIS — L03.116 CELLULITIS OF LEFT LOWER EXTREMITY: Primary | ICD-10-CM

## 2021-11-26 PROCEDURE — G8427 DOCREV CUR MEDS BY ELIG CLIN: HCPCS | Performed by: NURSE PRACTITIONER

## 2021-11-26 PROCEDURE — 99214 OFFICE O/P EST MOD 30 MIN: CPT | Performed by: NURSE PRACTITIONER

## 2021-11-26 PROCEDURE — G8417 CALC BMI ABV UP PARAM F/U: HCPCS | Performed by: NURSE PRACTITIONER

## 2021-11-26 PROCEDURE — 1036F TOBACCO NON-USER: CPT | Performed by: NURSE PRACTITIONER

## 2021-11-26 PROCEDURE — 4040F PNEUMOC VAC/ADMIN/RCVD: CPT | Performed by: NURSE PRACTITIONER

## 2021-11-26 PROCEDURE — 1123F ACP DISCUSS/DSCN MKR DOCD: CPT | Performed by: NURSE PRACTITIONER

## 2021-11-26 PROCEDURE — G8484 FLU IMMUNIZE NO ADMIN: HCPCS | Performed by: NURSE PRACTITIONER

## 2021-11-26 RX ORDER — FUROSEMIDE 20 MG/1
20 TABLET ORAL DAILY
Qty: 3 TABLET | Refills: 0 | Status: SHIPPED
Start: 2021-11-26 | End: 2021-12-08

## 2021-11-26 RX ORDER — DOXYCYCLINE HYCLATE 100 MG
100 TABLET ORAL 2 TIMES DAILY
Qty: 10 TABLET | Refills: 0 | Status: SHIPPED | OUTPATIENT
Start: 2021-11-26 | End: 2021-12-01

## 2021-11-26 NOTE — PROGRESS NOTES
Chief Complaint   Leg Swelling (left leg is still swollen and burning for the last 5 days. He has had this issue recurrently and it just flared up again 5 days ago. He states he is unable to get into his PCP)    History of Present Illness   Source of history provided by:  patient. Vickey Hirsch is a 64 y.o. old male presenting to the walk in clinic for evaluation of leg swelling to the left leg, which began 5 days ago. Denies injury to leg. Denies pain. Denies any known insect bite. He states he is able to ambulate without difficulties. Reports the area is mildly warm & burning sensation to touch. Denies lymphangitic streaking. Denies any bleeding or active drainage. Denies pruritis. He reports a patch of dry skin on the left calf that he has seen Dermatology for in the past. He puts lotion on biterally legs every morning. Since onset the symptoms have progressed. Denies any fever, chills, HA, recent illness, myalgias, nausea, vomiting, or lethargy. Wears compression socks when travels. ROS    Unless otherwise stated in this report or unable to obtain because of the patient's clinical or mental status as evidenced by the medical record, this patients's positive and negative responses for Review of Systems, constitutional, psych, eyes, ENT, cardiovascular, respiratory, gastrointestinal, neurological, genitourinary, musculoskeletal, integument systems and systems related to the presenting problem are either stated in the preceding or were not pertinent or were negative for the symptoms and/or complaints related to the medical problem. Past Medical History:  has a past medical history of Acid reflux, Gómez esophagus, BPH (benign prostatic hyperplasia), and Pernicious anemia. Past Surgical History:  has a past surgical history that includes Carpal tunnel release (Right); Appendectomy; knee surgery (Right); Tonsillectomy; and Colonoscopy (11/02/2016).   Social History:  reports that he quit smoking about 34 years ago. He has a 5.00 pack-year smoking history. He has never used smokeless tobacco. He reports that he does not drink alcohol and does not use drugs. Family History: family history includes Atrial Fibrillation in his mother; Coronary Art Dis in his mother; Dementia in his mother; High Blood Pressure in his father and mother; Other in his father; Uterine Cancer in his mother. Allergies: Patient has no known allergies. Physical Exam         VS:  BP (!) 147/88   Pulse 71   Temp 97.4 °F (36.3 °C) (Temporal)   Resp 18   Ht 5' 9\" (1.753 m)   Wt 215 lb (97.5 kg)   SpO2 97%   BMI 31.75 kg/m²    Oxygen Saturation Interpretation: Normal.  Constitutional:  Alert, development consistent with age. NAD. Lungs:  CTAB without wheezing, rales, or rhonchi. Heart:  Regular rate and rhythm, no pathologic murmurs, rubs, or gallops. Skin:  Normal turgor and appropriately dry to touch. 1+ left leg edema from below knee down to foot. Warmth over the same area. No bleeding or drainage noted. No lymphangitic streaking. No fluctuance noted. Excessively dry patch to left posterior calf. Negative Lionel's sign  Neurological:  Orientation age-appropriate unless noted elseware. Motor functions intact. Musculoskeletal: Lionel's sign negative. No ambulation difficulties.      Assessment / Plan     Last imaging:     Impression   1.  Left lower extremity negative for deep venous thrombosis.       2.  Probable mildly-complex left Baker cyst, as described.       3.  Incidental and indeterminate moderate left inguinal lymphadenopathy, as   described.       RECOMMENDATIONS:       1.  Impression 3: Given the reported history of prostate cancer, consider   histologic sampling and/or CT of the abdomen and pelvis, as directed   clinically.       Findings were sent to the [address] [place] [address]   at 1:18 pm on 10/12/2021 to be communicated to a licensed caregiver.       Attempts to contact the referring physician are currently in progress       ASSESSMENT/PLAN:    1. Cellulitis of left lower extremity  - doxycycline hyclate (VIBRA-TABS) 100 MG tablet; Take 1 tablet by mouth 2 times daily for 5 days  Dispense: 10 tablet; Refill: 0    2. Pain and swelling of left lower leg  - doxycycline hyclate (VIBRA-TABS) 100 MG tablet; Take 1 tablet by mouth 2 times daily for 5 days  Dispense: 10 tablet; Refill: 0  - furosemide (LASIX) 20 MG tablet; Take 1 tablet by mouth daily for 3 days  Dispense: 3 tablet; Refill: 0  - F/u with PCP as scheduled  - Use of compression stockings & avoid sodium intake  - R/o DVT, PVD    - F/u with PCP for continued care    Disposition:  Disposition: stable    Scripts written for medication to cover for possible cellulitis, side effects discussed. Return to clinic or f/u with PCP for recheck. ED sooner if symptoms worsen or change. ED immediately with the development of fever, body aches, shaking chills, spreading erythema, lymphangitic streaking, lethargy, CP, or SOB. Pt is in agreement with this care plan. All questions answered. MANOHAR Goldberg - CNP    **This report was transcribed using voice recognition software. Every effort was made to ensure accuracy; however, inadvertent computerized transcription errors may be present.

## 2021-11-30 RX ORDER — PANTOPRAZOLE SODIUM 40 MG/1
TABLET, DELAYED RELEASE ORAL
Qty: 90 TABLET | Refills: 1 | Status: SHIPPED | OUTPATIENT
Start: 2021-11-30 | End: 2022-07-25 | Stop reason: SDUPTHER

## 2021-12-06 ENCOUNTER — TELEPHONE (OUTPATIENT)
Dept: FAMILY MEDICINE CLINIC | Age: 76
End: 2021-12-06

## 2021-12-06 DIAGNOSIS — K21.9 GASTROESOPHAGEAL REFLUX DISEASE WITHOUT ESOPHAGITIS: ICD-10-CM

## 2021-12-06 DIAGNOSIS — C61 PROSTATE CANCER (HCC): Primary | ICD-10-CM

## 2021-12-06 DIAGNOSIS — R73.9 HYPERGLYCEMIA: ICD-10-CM

## 2021-12-06 DIAGNOSIS — E78.5 HYPERLIPIDEMIA, UNSPECIFIED HYPERLIPIDEMIA TYPE: ICD-10-CM

## 2021-12-07 DIAGNOSIS — R73.9 HYPERGLYCEMIA: ICD-10-CM

## 2021-12-07 DIAGNOSIS — E78.5 HYPERLIPIDEMIA, UNSPECIFIED HYPERLIPIDEMIA TYPE: ICD-10-CM

## 2021-12-07 DIAGNOSIS — K21.9 GASTROESOPHAGEAL REFLUX DISEASE WITHOUT ESOPHAGITIS: ICD-10-CM

## 2021-12-07 LAB
ALBUMIN SERPL-MCNC: 4.2 G/DL (ref 3.5–5.2)
ALP BLD-CCNC: 67 U/L (ref 40–129)
ALT SERPL-CCNC: 25 U/L (ref 0–40)
ANION GAP SERPL CALCULATED.3IONS-SCNC: 10 MMOL/L (ref 7–16)
AST SERPL-CCNC: 26 U/L (ref 0–39)
BASOPHILS ABSOLUTE: 0.03 E9/L (ref 0–0.2)
BASOPHILS RELATIVE PERCENT: 0.7 % (ref 0–2)
BILIRUB SERPL-MCNC: 0.9 MG/DL (ref 0–1.2)
BUN BLDV-MCNC: 21 MG/DL (ref 6–23)
CALCIUM SERPL-MCNC: 9.8 MG/DL (ref 8.6–10.2)
CHLORIDE BLD-SCNC: 104 MMOL/L (ref 98–107)
CHOLESTEROL, TOTAL: 175 MG/DL (ref 0–199)
CO2: 25 MMOL/L (ref 22–29)
CREAT SERPL-MCNC: 1.2 MG/DL (ref 0.7–1.2)
EOSINOPHILS ABSOLUTE: 0.09 E9/L (ref 0.05–0.5)
EOSINOPHILS RELATIVE PERCENT: 2.1 % (ref 0–6)
GFR AFRICAN AMERICAN: >60
GFR NON-AFRICAN AMERICAN: 59 ML/MIN/1.73
GLUCOSE BLD-MCNC: 87 MG/DL (ref 74–99)
HBA1C MFR BLD: 4.9 % (ref 4–5.6)
HCT VFR BLD CALC: 42.4 % (ref 37–54)
HDLC SERPL-MCNC: 45 MG/DL
HEMOGLOBIN: 14 G/DL (ref 12.5–16.5)
IMMATURE GRANULOCYTES #: 0.01 E9/L
IMMATURE GRANULOCYTES %: 0.2 % (ref 0–5)
LDL CHOLESTEROL CALCULATED: 116 MG/DL (ref 0–99)
LYMPHOCYTES ABSOLUTE: 1.03 E9/L (ref 1.5–4)
LYMPHOCYTES RELATIVE PERCENT: 23.7 % (ref 20–42)
MCH RBC QN AUTO: 32.3 PG (ref 26–35)
MCHC RBC AUTO-ENTMCNC: 33 % (ref 32–34.5)
MCV RBC AUTO: 97.7 FL (ref 80–99.9)
MONOCYTES ABSOLUTE: 0.39 E9/L (ref 0.1–0.95)
MONOCYTES RELATIVE PERCENT: 9 % (ref 2–12)
NEUTROPHILS ABSOLUTE: 2.8 E9/L (ref 1.8–7.3)
NEUTROPHILS RELATIVE PERCENT: 64.3 % (ref 43–80)
PDW BLD-RTO: 14.4 FL (ref 11.5–15)
PLATELET # BLD: 187 E9/L (ref 130–450)
PMV BLD AUTO: 10.1 FL (ref 7–12)
POTASSIUM SERPL-SCNC: 4.6 MMOL/L (ref 3.5–5)
RBC # BLD: 4.34 E12/L (ref 3.8–5.8)
SODIUM BLD-SCNC: 139 MMOL/L (ref 132–146)
TOTAL PROTEIN: 6.9 G/DL (ref 6.4–8.3)
TRIGL SERPL-MCNC: 68 MG/DL (ref 0–149)
VLDLC SERPL CALC-MCNC: 14 MG/DL
WBC # BLD: 4.4 E9/L (ref 4.5–11.5)

## 2021-12-08 ENCOUNTER — OFFICE VISIT (OUTPATIENT)
Dept: FAMILY MEDICINE CLINIC | Age: 76
End: 2021-12-08
Payer: MEDICARE

## 2021-12-08 VITALS
OXYGEN SATURATION: 98 % | HEART RATE: 73 BPM | DIASTOLIC BLOOD PRESSURE: 70 MMHG | WEIGHT: 213 LBS | BODY MASS INDEX: 31.45 KG/M2 | SYSTOLIC BLOOD PRESSURE: 120 MMHG | TEMPERATURE: 97 F

## 2021-12-08 DIAGNOSIS — M79.89 LEFT LEG SWELLING: Primary | ICD-10-CM

## 2021-12-08 DIAGNOSIS — K21.9 GASTROESOPHAGEAL REFLUX DISEASE WITHOUT ESOPHAGITIS: ICD-10-CM

## 2021-12-08 DIAGNOSIS — R05.9 COUGH: ICD-10-CM

## 2021-12-08 DIAGNOSIS — R59.0 INGUINAL ADENOPATHY: ICD-10-CM

## 2021-12-08 DIAGNOSIS — C61 PROSTATE CA (HCC): ICD-10-CM

## 2021-12-08 LAB — PROSTATE SPECIFIC ANTIGEN: 10.2 NG/ML (ref 0–4)

## 2021-12-08 PROCEDURE — G8484 FLU IMMUNIZE NO ADMIN: HCPCS | Performed by: INTERNAL MEDICINE

## 2021-12-08 PROCEDURE — 1123F ACP DISCUSS/DSCN MKR DOCD: CPT | Performed by: INTERNAL MEDICINE

## 2021-12-08 PROCEDURE — 99214 OFFICE O/P EST MOD 30 MIN: CPT | Performed by: INTERNAL MEDICINE

## 2021-12-08 PROCEDURE — G8427 DOCREV CUR MEDS BY ELIG CLIN: HCPCS | Performed by: INTERNAL MEDICINE

## 2021-12-08 PROCEDURE — 1036F TOBACCO NON-USER: CPT | Performed by: INTERNAL MEDICINE

## 2021-12-08 PROCEDURE — 4040F PNEUMOC VAC/ADMIN/RCVD: CPT | Performed by: INTERNAL MEDICINE

## 2021-12-08 PROCEDURE — G8417 CALC BMI ABV UP PARAM F/U: HCPCS | Performed by: INTERNAL MEDICINE

## 2021-12-08 RX ORDER — TAMSULOSIN HYDROCHLORIDE 0.4 MG/1
CAPSULE ORAL
Qty: 180 CAPSULE | Refills: 1 | Status: SHIPPED | OUTPATIENT
Start: 2021-12-08

## 2021-12-08 NOTE — PROGRESS NOTES
Subjective:     Chief Complaint   Patient presents with    Leg Swelling   Patient complains of swelling of the left leg for the past 2 months, he had swelling in 2021, he was seen at walk-in clinic, ultrasound leg was negative for DVT, he was treated conservatively, he was given Lasix antibiotics,  His swelling did get better, he went to [facility] at that time he was wearing stocking and the swelling did go down    Still having swelling of the left leg on and off when he came back    He had a blood work done which was reviewed PSA elevated to 10.2 it was 6.3 before he got the seeds implanted    She denies any chest pain, denies any shortness of breath,      History of GE reflux is doing well,    Denies any urine difficulties,    Past Medical History:   Diagnosis Date    Acid reflux     Barrette esophagus     Gómez esophagus     BPH (benign prostatic hyperplasia)     Pernicious anemia         Social History     Socioeconomic History    Marital status:      Spouse name: Not on file    Number of children: Not on file    Years of education: Not on file    Highest education level: Not on file   Occupational History    Not on file   Tobacco Use    Smoking status: Former Smoker     Packs/day: 1.00     Years: 5.00     Pack years: 5.00     Quit date: 1986     Years since quittin.0    Smokeless tobacco: Never Used   Substance and Sexual Activity    Alcohol use: No    Drug use: No    Sexual activity: Not on file   Other Topics Concern    Not on file   Social History Narrative    Not on file     Social Determinants of Health     Financial Resource Strain: Low Risk     Difficulty of Paying Living Expenses: Not hard at all   Food Insecurity: No Food Insecurity    Worried About Running Out of Food in the Last Year: Never true    Consuelo of Food in the Last Year: Never true   Transportation Needs:     Lack of Transportation (Medical):  Not on file    Lack of Transportation (Non-Medical): Not on file   Physical Activity:     Days of Exercise per Week: Not on file    Minutes of Exercise per Session: Not on file   Stress:     Feeling of Stress : Not on file   Social Connections:     Frequency of Communication with Friends and Family: Not on file    Frequency of Social Gatherings with Friends and Family: Not on file    Attends Religion Services: Not on file    Active Member of [address] [place] [address] [employer] or Organizations: Not on file    Attends Club or Organization Meetings: Not on file    Marital Status: Not on file   Intimate Partner Violence:     Fear of Current or Ex-Partner: Not on file    Emotionally Abused: Not on file    Physically Abused: Not on file    Sexually Abused: Not on file   Housing Stability:     Unable to Pay for Housing in the Last Year: Not on file    Number of [place] in the Last Year: Not on file    Unstable Housing in the Last Year: Not on file        Past Surgical History:   Procedure Laterality Date    APPENDECTOMY      CARPAL TUNNEL RELEASE Right     COLONOSCOPY  11/02/2016    KNEE SURGERY Right     TONSILLECTOMY          Family History   Problem Relation Age of Onset    High Blood Pressure Mother     Uterine Cancer Mother     Dementia Mother     Atrial Fibrillation Mother     Coronary Art Dis Mother     High Blood Pressure Father     Other Father         myelodysplatic syndrome         No Known Allergies     ROS  No acute distress  Cardiac: Denies any chest pain or palpitation  Very active walks long distance stress test in [place] clinic in 2018 -ve  Respiratory: Denies any cough or shortness of breath  GI: No abdominal pain.  Denies any nausea vomiting or diarrhea  GE reflux, Gómez's esophagus, endoscopy and colonoscopy March 2019 with Dr. Geovani Evans denies any dysphagia  : Denies any dysuria frequency or hematuria  Recent diagnosis of prostate cancer Becky score 7 seed implant  He sees oncologist in [place] clinic  Neuro: No headache or dizziness  Endocrine: No diabetes  Skin: normal  No recent weight gain or weight loss  Denies any change in vision    Objective:    /70   Pulse 73   Temp 97 °F (36.1 °C)   Wt 213 lb (96.6 kg)   SpO2 98%   BMI 31.45 kg/m²     Constitutional: Alert awake and oriented  Eyes: Pupils equal bilaterally. Extraocular muscles intact  Neck: no JVD adenopathy no bruit  Heart:  RRR, no murmurs, gallops, or rubs. Lungs:    no wheeze, rales or rhonchi  Abd: bowel sounds present, nontender, nondistended, no masses  Extrem:  No clubbing, cyanosis,   2+ pitting edema left leg  No calf tenderness  Left inguinal lymph node enlargement or small lymph nodes    Neuro: AAOx3,No Focal deficit  Psychological: no depression or anxiety       Current Outpatient Medications   Medication Sig Dispense Refill    tamsulosin (FLOMAX) 0.4 MG capsule TAKE TWO CAPSULES BY MOUTH DAILY AT BEDTIME 180 capsule 1    pantoprazole (PROTONIX) 40 MG tablet TAKE 1 TABLET BY MOUTH IN  THE EVENING 90 tablet 1    fluticasone (FLONASE) 50 MCG/ACT nasal spray 2 sprays by Each Nostril route daily 1 Bottle 5    Cholecalciferol (VITAMIN D3) 50 MCG (2000 UT) CAPS Take by mouth      Cyanocobalamin (B-12) 2500 MCG TABS Take 2,500 mg by mouth daily      Multiple Vitamins-Minerals (THERAPEUTIC MULTIVITAMIN-MINERALS) tablet Take 1 tablet by mouth daily      KRILL OIL PO Take 1 capsule by mouth daily      calcium carbonate (TUMS) 500 MG chewable tablet Take 2 tablets by mouth 3 times daily as needed for Heartburn       No current facility-administered medications for this visit.         Last 3 BMP  Lab Results   Component Value Date/Time     12/07/2021 10:40 AM     10/12/2021 12:10 PM     08/17/2021 10:27 AM    K 4.6 12/07/2021 10:40 AM    K 4.2 10/12/2021 12:10 PM    K 5.1 (H) 08/17/2021 10:27 AM    K 4.4 02/25/2020 08:34 AM     12/07/2021 10:40 AM     10/12/2021 12:10 PM     08/17/2021 10:27 AM    CO2 25 12/07/2021 10:40 AM    CO2 26 10/12/2021 12:10 PM    CO2 26 08/17/2021 10:27 AM    BUN 21 12/07/2021 10:40 AM    BUN 16 10/12/2021 12:10 PM    BUN 17 08/17/2021 10:27 AM    CREATININE 1.2 12/07/2021 10:40 AM    CREATININE 1.1 10/12/2021 12:10 PM    CREATININE 1.1 08/17/2021 10:27 AM    CREATININE 1.2 12/30/2019 12:00 AM    GLUCOSE 87 12/07/2021 10:40 AM    GLUCOSE 85 10/12/2021 12:10 PM    GLUCOSE 92 08/17/2021 10:27 AM    GLUCOSE 102 02/24/2012 08:38 AM    GLUCOSE 94 11/08/2010 08:48 AM    CALCIUM 9.8 12/07/2021 10:40 AM    CALCIUM 9.3 10/12/2021 12:10 PM    CALCIUM 9.6 08/17/2021 10:27 AM       Last 3 CMP:    Lab Results   Component Value Date/Time     12/07/2021 10:40 AM     10/12/2021 12:10 PM     08/17/2021 10:27 AM    K 4.6 12/07/2021 10:40 AM    K 4.2 10/12/2021 12:10 PM    K 5.1 (H) 08/17/2021 10:27 AM    K 4.4 02/25/2020 08:34 AM     12/07/2021 10:40 AM     10/12/2021 12:10 PM     08/17/2021 10:27 AM    CO2 25 12/07/2021 10:40 AM    CO2 26 10/12/2021 12:10 PM    CO2 26 08/17/2021 10:27 AM    BUN 21 12/07/2021 10:40 AM    BUN 16 10/12/2021 12:10 PM    BUN 17 08/17/2021 10:27 AM    CREATININE 1.2 12/07/2021 10:40 AM    CREATININE 1.1 10/12/2021 12:10 PM    CREATININE 1.1 08/17/2021 10:27 AM    CREATININE 1.2 12/30/2019 12:00 AM    GLUCOSE 87 12/07/2021 10:40 AM    GLUCOSE 85 10/12/2021 12:10 PM    GLUCOSE 92 08/17/2021 10:27 AM    GLUCOSE 102 02/24/2012 08:38 AM    GLUCOSE 94 11/08/2010 08:48 AM    CALCIUM 9.8 12/07/2021 10:40 AM    CALCIUM 9.3 10/12/2021 12:10 PM    CALCIUM 9.6 08/17/2021 10:27 AM    PROT 6.9 12/07/2021 10:40 AM    PROT 7.4 10/12/2021 12:10 PM    PROT 7.0 08/17/2021 10:27 AM    LABALBU 4.2 12/07/2021 10:40 AM    LABALBU 4.2 10/12/2021 12:10 PM    LABALBU 4.4 08/17/2021 10:27 AM    LABALBU 4.1 02/24/2012 08:38 AM    LABALBU 4.3 11/08/2010 08:48 AM    BILITOT 0.9 12/07/2021 10:40 AM    BILITOT 1.0 10/12/2021 12:10 PM    BILITOT 0.9 08/17/2021 10:27 AM    ALKPHOS 67 12/07/2021 10:40 AM    ALKPHOS 79 10/12/2021 12:10 PM    ALKPHOS 73 08/17/2021 10:27 AM    AST 26 12/07/2021 10:40 AM    AST 23 10/12/2021 12:10 PM    AST 34 08/17/2021 10:27 AM    ALT 25 12/07/2021 10:40 AM    ALT 25 10/12/2021 12:10 PM    ALT 28 08/17/2021 10:27 AM        CBC:   Lab Results   Component Value Date/Time    WBC 4.4 (L) 12/07/2021 10:40 AM    RBC 4.34 12/07/2021 10:40 AM    HGB 14.0 12/07/2021 10:40 AM    HCT 42.4 12/07/2021 10:40 AM    MCV 97.7 12/07/2021 10:40 AM    MCH 32.3 12/07/2021 10:40 AM    MCHC 33.0 12/07/2021 10:40 AM    RDW 14.4 12/07/2021 10:40 AM     12/07/2021 10:40 AM    MPV 10.1 12/07/2021 10:40 AM       A1C:  Lab Results   Component Value Date/Time    LABA1C 4.9 12/07/2021 10:40 AM       Lipid panel:  Lab Results   Component Value Date    CHOL 175 12/07/2021    CHOL 147 08/17/2021    CHOL 167 12/30/2019    TRIG 68 12/07/2021    TRIG 82 08/17/2021    TRIG 134 12/30/2019    HDL 45 12/07/2021    HDL 42 08/17/2021    HDL 44 10/08/2020        Lab Results   Component Value Date/Time    PROT 6.9 12/07/2021 10:40 AM    PROT 7.4 10/12/2021 12:10 PM    PROT 7.0 08/17/2021 10:27 AM       No results found for: MG      Assessment. Jorge A Smith was seen today for leg swelling. Diagnoses and all orders for this visit:    Left leg swelling  -     US DUP LOWER EXTREMITY LEFT DIMA; Future  -     CT ABDOMEN PELVIS W IV CONTRAST Additional Contrast? Oral; Future  -     XR CHEST (2 VW); Future    Prostate CA ([address]  -     US DUP LOWER EXTREMITY LEFT DIMA; Future  -     CT ABDOMEN PELVIS W IV CONTRAST Additional Contrast? Oral; Future  -     XR CHEST (2 VW); Future    Inguinal adenopathy  -     US DUP LOWER EXTREMITY LEFT DIMA; Future  -     CT ABDOMEN PELVIS W IV CONTRAST Additional Contrast? Oral; Future  -     XR CHEST (2 VW); Future    Cough  -     XR CHEST (2 VW);  Future    Gastroesophageal reflux disease without esophagitis    Other orders  -     tamsulosin (FLOMAX) 0.4 MG capsule; TAKE TWO CAPSULES BY MOUTH DAILY AT BEDTIME       Patient Active Problem List   Diagnosis    Hemorrhoid thrombosis    Prostate cancer ([address]    Pernicious anemia    Hyperlipidemia    Gastroesophageal reflux disease without esophagitis    Acute sinusitis       Plan: Left leg swelling rule out DVT  Stat ultrasound left leg requested should be done today, was scheduled    Prostate cancer his PSA is going up it was 6.30 now is 10.20, advised to follow with the oncologist in [facility] patient has an appointment in February, I advised him to call them today and discussed with them, if he would like to see him earlier    Left inguinal lymph node enlargement  CT abdomen pelvis with IV contrast  Rule out intra-abdominal pathology  Causing left leg swelling and prostate cancer    X-ray chest    Patient likes to go to [facility] for CAT scan because he follows with oncologist in [facility]    He is using Flomax which is helping    GE reflux disease stable Protonix helping denies any dysphagia    Follow back in 10 days    Go to ER if any new symptoms or shortness of breath    Return in about 10 days (around 12/18/2021).        Esperanza Huber MD  2:09 PM  12/8/2021     DE

## 2021-12-20 ENCOUNTER — OFFICE VISIT (OUTPATIENT)
Dept: FAMILY MEDICINE CLINIC | Age: 76
End: 2021-12-20
Payer: MEDICARE

## 2021-12-20 VITALS
DIASTOLIC BLOOD PRESSURE: 80 MMHG | BODY MASS INDEX: 31.9 KG/M2 | WEIGHT: 216 LBS | SYSTOLIC BLOOD PRESSURE: 122 MMHG | TEMPERATURE: 97 F | OXYGEN SATURATION: 98 % | HEART RATE: 83 BPM

## 2021-12-20 DIAGNOSIS — C77.2 MALIGNANT NEOPLASM METASTATIC TO INTRA-ABDOMINAL LYMPH NODE (HCC): Primary | ICD-10-CM

## 2021-12-20 DIAGNOSIS — R05.9 COUGH: ICD-10-CM

## 2021-12-20 DIAGNOSIS — R60.0 LEG EDEMA, LEFT: ICD-10-CM

## 2021-12-20 DIAGNOSIS — K21.9 GASTROESOPHAGEAL REFLUX DISEASE WITHOUT ESOPHAGITIS: ICD-10-CM

## 2021-12-20 DIAGNOSIS — C61 PROSTATE CA (HCC): ICD-10-CM

## 2021-12-20 DIAGNOSIS — R59.0 INGUINAL ADENOPATHY: ICD-10-CM

## 2021-12-20 DIAGNOSIS — D51.0 PERNICIOUS ANEMIA: ICD-10-CM

## 2021-12-20 DIAGNOSIS — R59.1 LYMPHADENOPATHY: ICD-10-CM

## 2021-12-20 DIAGNOSIS — M79.89 LEFT LEG SWELLING: ICD-10-CM

## 2021-12-20 PROCEDURE — 4040F PNEUMOC VAC/ADMIN/RCVD: CPT | Performed by: INTERNAL MEDICINE

## 2021-12-20 PROCEDURE — G8417 CALC BMI ABV UP PARAM F/U: HCPCS | Performed by: INTERNAL MEDICINE

## 2021-12-20 PROCEDURE — 1036F TOBACCO NON-USER: CPT | Performed by: INTERNAL MEDICINE

## 2021-12-20 PROCEDURE — 1123F ACP DISCUSS/DSCN MKR DOCD: CPT | Performed by: INTERNAL MEDICINE

## 2021-12-20 PROCEDURE — 99214 OFFICE O/P EST MOD 30 MIN: CPT | Performed by: INTERNAL MEDICINE

## 2021-12-20 PROCEDURE — G8427 DOCREV CUR MEDS BY ELIG CLIN: HCPCS | Performed by: INTERNAL MEDICINE

## 2021-12-20 PROCEDURE — G8484 FLU IMMUNIZE NO ADMIN: HCPCS | Performed by: INTERNAL MEDICINE

## 2021-12-20 PROCEDURE — 96372 THER/PROPH/DIAG INJ SC/IM: CPT | Performed by: INTERNAL MEDICINE

## 2021-12-20 RX ORDER — CYANOCOBALAMIN 1000 UG/ML
1000 INJECTION INTRAMUSCULAR; SUBCUTANEOUS ONCE
Status: COMPLETED | OUTPATIENT
Start: 2021-12-20 | End: 2021-12-20

## 2021-12-20 RX ADMIN — CYANOCOBALAMIN 1000 MCG: 1000 INJECTION INTRAMUSCULAR; SUBCUTANEOUS at 10:04

## 2021-12-20 NOTE — PROGRESS NOTES
Frequency of Communication with Friends and Family: Not on file    Frequency of Social Gatherings with Friends and Family: Not on file    Attends Yarsani Services: Not on file    Active Member of Clubs or Organizations: Not on file    Attends Club or Organization Meetings: Not on file    Marital Status: Not on file   Intimate Partner Violence:     Fear of Current or Ex-Partner: Not on file    Emotionally Abused: Not on file    Physically Abused: Not on file    Sexually Abused: Not on file   Housing Stability:     Unable to Pay for Housing in the Last Year: Not on file    Number of [place] in the Last Year: Not on file    Unstable Housing in the Last Year: Not on file        Past Surgical History:   Procedure Laterality Date    APPENDECTOMY      CARPAL TUNNEL RELEASE Right     COLONOSCOPY  11/02/2016    KNEE SURGERY Right     TONSILLECTOMY          Family History   Problem Relation Age of Onset    High Blood Pressure Mother     Uterine Cancer Mother     Dementia Mother     Atrial Fibrillation Mother     Coronary Art Dis Mother     High Blood Pressure Father     Other Father         myelodysplatic syndrome         No Known Allergies     ROS  No acute distress  Cardiac: Denies any chest pain or palpitation  Very active walks long distance stress test in [place] clinic in 2018 -ve  Respiratory: Denies any cough or shortness of breath  GI: No abdominal pain.  Denies any nausea vomiting or diarrhea  GE reflux, Gómez's esophagus, endoscopy and colonoscopy March 2019 with Dr. Charisse Justice denies any dysphagia  : Denies any dysuria frequency or hematuria  Recent diagnosis of prostate cancer Becky score 7 seed implant  He sees oncologist in [place] clinic  Neuro: No headache or dizziness  Endocrine: No diabetes  Skin: normal  No recent weight gain or weight loss  Denies any change in vision      Left leg swelling comes and goes for the past few months      Objective:    /80   Pulse 83 Temp 97 °F (36.1 °C)   Wt 216 lb (98 kg)   SpO2 98%   BMI 31.90 kg/m²     Constitutional: Alert awake and oriented  Eyes: Pupils equal bilaterally. Extraocular muscles intact  Neck: no JVD adenopathy no bruit  Heart:  RRR, no murmurs, gallops, or rubs. Lungs:    no wheeze, rales or rhonchi  Abd: bowel sounds present, nontender, nondistended, no masses  Extrem:  No clubbing, cyanosis, or edema  Neuro: AAOx3,No Focal deficit  Psychological: no depression or anxiety     2+ edema left leg  No calf tenderness  Homans test negative  Distal pulses intact    Left groin lymph node enlarged felt  Current Outpatient Medications   Medication Sig Dispense Refill    tamsulosin (FLOMAX) 0.4 MG capsule TAKE TWO CAPSULES BY MOUTH DAILY AT BEDTIME 180 capsule 1    pantoprazole (PROTONIX) 40 MG tablet TAKE 1 TABLET BY MOUTH IN  THE EVENING 90 tablet 1    fluticasone (FLONASE) 50 MCG/ACT nasal spray 2 sprays by Each Nostril route daily 1 Bottle 5    Cholecalciferol (VITAMIN D3) 50 MCG (2000 UT) CAPS Take by mouth      Cyanocobalamin (B-12) 2500 MCG TABS Take 2,500 mg by mouth daily      Multiple Vitamins-Minerals (THERAPEUTIC MULTIVITAMIN-MINERALS) tablet Take 1 tablet by mouth daily      KRILL OIL PO Take 1 capsule by mouth daily      calcium carbonate (TUMS) 500 MG chewable tablet Take 2 tablets by mouth 3 times daily as needed for Heartburn       No current facility-administered medications for this visit.         Last 3 BMP  Lab Results   Component Value Date/Time     12/07/2021 10:40 AM     10/12/2021 12:10 PM     08/17/2021 10:27 AM    K 4.6 12/07/2021 10:40 AM    K 4.2 10/12/2021 12:10 PM    K 5.1 (H) 08/17/2021 10:27 AM    K 4.4 02/25/2020 08:34 AM     12/07/2021 10:40 AM     10/12/2021 12:10 PM     08/17/2021 10:27 AM    CO2 25 12/07/2021 10:40 AM    CO2 26 10/12/2021 12:10 PM    CO2 26 08/17/2021 10:27 AM    BUN 21 12/07/2021 10:40 AM    BUN 16 10/12/2021 12:10 PM    BUN 17 08/17/2021 10:27 AM    CREATININE 1.2 12/07/2021 10:40 AM    CREATININE 1.1 10/12/2021 12:10 PM    CREATININE 1.1 08/17/2021 10:27 AM    CREATININE 1.2 12/30/2019 12:00 AM    GLUCOSE 87 12/07/2021 10:40 AM    GLUCOSE 85 10/12/2021 12:10 PM    GLUCOSE 92 08/17/2021 10:27 AM    GLUCOSE 102 02/24/2012 08:38 AM    GLUCOSE 94 11/08/2010 08:48 AM    CALCIUM 9.8 12/07/2021 10:40 AM    CALCIUM 9.3 10/12/2021 12:10 PM    CALCIUM 9.6 08/17/2021 10:27 AM       Last 3 CMP:    Lab Results   Component Value Date/Time     12/07/2021 10:40 AM     10/12/2021 12:10 PM     08/17/2021 10:27 AM    K 4.6 12/07/2021 10:40 AM    K 4.2 10/12/2021 12:10 PM    K 5.1 (H) 08/17/2021 10:27 AM    K 4.4 02/25/2020 08:34 AM     12/07/2021 10:40 AM     10/12/2021 12:10 PM     08/17/2021 10:27 AM    CO2 25 12/07/2021 10:40 AM    CO2 26 10/12/2021 12:10 PM    CO2 26 08/17/2021 10:27 AM    BUN 21 12/07/2021 10:40 AM    BUN 16 10/12/2021 12:10 PM    BUN 17 08/17/2021 10:27 AM    CREATININE 1.2 12/07/2021 10:40 AM    CREATININE 1.1 10/12/2021 12:10 PM    CREATININE 1.1 08/17/2021 10:27 AM    CREATININE 1.2 12/30/2019 12:00 AM    GLUCOSE 87 12/07/2021 10:40 AM    GLUCOSE 85 10/12/2021 12:10 PM    GLUCOSE 92 08/17/2021 10:27 AM    GLUCOSE 102 02/24/2012 08:38 AM    GLUCOSE 94 11/08/2010 08:48 AM    CALCIUM 9.8 12/07/2021 10:40 AM    CALCIUM 9.3 10/12/2021 12:10 PM    CALCIUM 9.6 08/17/2021 10:27 AM    PROT 6.9 12/07/2021 10:40 AM    PROT 7.4 10/12/2021 12:10 PM    PROT 7.0 08/17/2021 10:27 AM    LABALBU 4.2 12/07/2021 10:40 AM    LABALBU 4.2 10/12/2021 12:10 PM    LABALBU 4.4 08/17/2021 10:27 AM    LABALBU 4.1 02/24/2012 08:38 AM    LABALBU 4.3 11/08/2010 08:48 AM    BILITOT 0.9 12/07/2021 10:40 AM    BILITOT 1.0 10/12/2021 12:10 PM    BILITOT 0.9 08/17/2021 10:27 AM    ALKPHOS 67 12/07/2021 10:40 AM    ALKPHOS 79 10/12/2021 12:10 PM    ALKPHOS 73 08/17/2021 10:27 AM    AST 26 12/07/2021 10:40 AM    AST 23 10/12/2021 12:10 PM    AST 34 08/17/2021 10:27 AM    ALT 25 12/07/2021 10:40 AM    ALT 25 10/12/2021 12:10 PM    ALT 28 08/17/2021 10:27 AM        CBC:   Lab Results   Component Value Date/Time    WBC 4.4 (L) 12/07/2021 10:40 AM    RBC 4.34 12/07/2021 10:40 AM    HGB 14.0 12/07/2021 10:40 AM    HCT 42.4 12/07/2021 10:40 AM    MCV 97.7 12/07/2021 10:40 AM    MCH 32.3 12/07/2021 10:40 AM    MCHC 33.0 12/07/2021 10:40 AM    RDW 14.4 12/07/2021 10:40 AM     12/07/2021 10:40 AM    MPV 10.1 12/07/2021 10:40 AM       A1C:  Lab Results   Component Value Date/Time    LABA1C 4.9 12/07/2021 10:40 AM       Lipid panel:  Lab Results   Component Value Date    CHOL 175 12/07/2021    CHOL 147 08/17/2021    CHOL 167 12/30/2019    TRIG 68 12/07/2021    TRIG 82 08/17/2021    TRIG 134 12/30/2019    HDL 45 12/07/2021    HDL 42 08/17/2021    HDL 44 10/08/2020        Lab Results   Component Value Date/Time    PROT 6.9 12/07/2021 10:40 AM    PROT 7.4 10/12/2021 12:10 PM    PROT 7.0 08/17/2021 10:27 AM       No results found for: MG      Liset Barragan was seen today for results. 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)    Pernicious anemia  -     cyanocobalamin injection 1,000 mcg    Prostate CA ([address]  -     US DUP LOWER EXTREMITY LEFT DIMA; Future    Lymphadenopathy  -     US DUP LOWER EXTREMITY LEFT DIMA;  Future    Leg edema, left       Patient Active Problem List   Diagnosis    Hemorrhoid thrombosis    Prostate cancer ([address]    Pernicious anemia    Hyperlipidemia    Gastroesophageal reflux disease without esophagitis    Acute sinusitis       Plan: Metastatic cancer to the lymph nodes it could be related to the prostate, he has prostate cancer had seed implant, his PSA has increased to 10  He spoke to the urologist last week, he is going to call them today again I advised him he should be seen by the urologist as soon as possible and should be seen by oncologist  He will need a biopsy of the lymph node for further diagnosis    [facility] and let me know then I can make appointment with the oncologist locally in [facility] clinic as per patient choice      Swelling of the left leg do the ultrasound left leg last ultrasound was 2 weeks ago  If it is negative yearly elastic stocking every day    Prostate cancer having symptoms are difficult urination at night increase Flomax 0.4 twice daily    Leg edema appears to be more compression due to lymph nodes      GE reflux disease doing much better with the Protonix      I had a long discussion regarding his CT abdomen pelvis, all their questions were answered,    I will be retiring in 2 weeks he will see another physician in 2 months here after seen by urology and oncology    Patient was counseled    Return in about 3 months (around 3/20/2022).        Vanessa De Jesus MD  10:53 AM  12/20/2021     [place]

## 2021-12-22 NOTE — PROGRESS NOTES
I called patient to check, did he see the urologist or did he see the oncologist,  He was not able to connect to the urologist they called but missed a call  He will talk to them today, let me know if you want to see oncologist locally or [facility] clinic

## 2021-12-23 ENCOUNTER — TELEPHONE (OUTPATIENT)
Dept: FAMILY MEDICINE CLINIC | Age: 76
End: 2021-12-23

## 2021-12-23 DIAGNOSIS — C61 PROSTATE CANCER METASTATIC TO BONE (HCC): Primary | ICD-10-CM

## 2021-12-23 DIAGNOSIS — C79.51 PROSTATE CANCER METASTATIC TO BONE (HCC): Primary | ICD-10-CM

## 2021-12-23 NOTE — TELEPHONE ENCOUNTER
Patient called to tell you to please call Dr. Shannon Parker office and schedule consult. Pt states you told him to let you know he was ready to schedule.      Last seen 12/20/2021  Next appt 2/24/2022    Dr. Chanel Ko

## 2021-12-23 NOTE — TELEPHONE ENCOUNTER
Tawanda, message left with Simoen Javed, the referral specialist at [facility] office, to call our office. All info is printed and ready to fax.

## 2021-12-23 NOTE — TELEPHONE ENCOUNTER
Please schedule the appointment with Dr. Huang Barron as soon as possible metastatic cancer, history of prostate cancer

## 2021-12-30 ENCOUNTER — HOSPITAL ENCOUNTER (OUTPATIENT)
Dept: ULTRASOUND IMAGING | Age: 76
Discharge: HOME | End: 2021-12-30
Payer: MEDICARE

## 2021-12-30 DIAGNOSIS — R59.0 INGUINAL ADENOPATHY: ICD-10-CM

## 2021-12-30 DIAGNOSIS — C61 MALIGNANT NEOPLASM OF PROSTATE (HCC): ICD-10-CM

## 2021-12-30 DIAGNOSIS — C79.52 SECONDARY MALIGNANT NEOPLASM OF BONE AND BONE MARROW (HCC): ICD-10-CM

## 2021-12-30 DIAGNOSIS — C79.51 SECONDARY MALIGNANT NEOPLASM OF BONE AND BONE MARROW (HCC): ICD-10-CM

## 2021-12-30 PROCEDURE — 88342 IMHCHEM/IMCYTCHM 1ST ANTB: CPT

## 2021-12-30 PROCEDURE — 76942 ECHO GUIDE FOR BIOPSY: CPT

## 2021-12-30 PROCEDURE — 88360 TUMOR IMMUNOHISTOCHEM/MANUAL: CPT

## 2021-12-30 PROCEDURE — 88341 IMHCHEM/IMCYTCHM EA ADD ANTB: CPT

## 2021-12-30 PROCEDURE — 88305 TISSUE EXAM BY PATHOLOGIST: CPT

## 2021-12-30 PROCEDURE — 88184 FLOWCYTOMETRY/ TC 1 MARKER: CPT

## 2021-12-30 PROCEDURE — 38505 NEEDLE BIOPSY LYMPH NODES: CPT

## 2021-12-30 PROCEDURE — 88173 CYTOPATH EVAL FNA REPORT: CPT

## 2021-12-30 PROCEDURE — 88185 FLOWCYTOMETRY/TC ADD-ON: CPT

## 2022-01-04 LAB
Lab: NORMAL
REPORT: NORMAL
THIS TEST SENT TO: NORMAL

## 2022-01-12 NOTE — PROGRESS NOTES
I called the patient to check on him, he did see the oncologist, and he is getting treatment for the cancer, he had a lymph node biopsy, he is waiting for the report, he has a follow-up appointment with the oncologist in 4 weeks

## 2022-02-08 ENCOUNTER — TELEPHONE (OUTPATIENT)
Dept: SURGERY | Age: 77
End: 2022-02-08

## 2022-02-08 ENCOUNTER — HOSPITAL ENCOUNTER (OUTPATIENT)
Dept: NUCLEAR MEDICINE | Age: 77
Discharge: HOME | End: 2022-02-08
Payer: MEDICARE

## 2022-02-08 DIAGNOSIS — C79.52 SECONDARY MALIGNANT NEOPLASM OF BONE AND BONE MARROW (HCC): ICD-10-CM

## 2022-02-08 DIAGNOSIS — C79.51 SECONDARY MALIGNANT NEOPLASM OF BONE AND BONE MARROW (HCC): ICD-10-CM

## 2022-02-08 DIAGNOSIS — C61 MALIGNANT NEOPLASM OF PROSTATE (HCC): ICD-10-CM

## 2022-02-08 PROCEDURE — A9503 TC99M MEDRONATE: HCPCS | Performed by: RADIOLOGY

## 2022-02-08 PROCEDURE — 3430000000 HC RX DIAGNOSTIC RADIOPHARMACEUTICAL: Performed by: RADIOLOGY

## 2022-02-08 PROCEDURE — 78306 BONE IMAGING WHOLE BODY: CPT

## 2022-02-08 RX ORDER — TC 99M MEDRONATE 20 MG/10ML
25 INJECTION, POWDER, LYOPHILIZED, FOR SOLUTION INTRAVENOUS
Status: COMPLETED | OUTPATIENT
Start: 2022-02-08 | End: 2022-02-08

## 2022-02-08 RX ADMIN — TC 99M MEDRONATE 25 MILLICURIE: 20 INJECTION, POWDER, LYOPHILIZED, FOR SOLUTION INTRAVENOUS at 08:27

## 2022-02-08 NOTE — TELEPHONE ENCOUNTER
Referral received from Dr. Clayton Ramos for patient to have a mediport insertion for poor venous access. Next chemotherapy for malignant neuroendocrine tumor, poorly differentiated, scheduled 22. Per Dr. Nupur Aguero, patient is scheduled for Mediport Insertion at [address] [place] [address] on 22. Surgery scheduled via iqueue, surgeon's calendar updated. Dr. Nupur Aguero to enter orders. Follow up as needed. Patient has received COVID 19 vaccine. Medications and labs reviewed. Electronically signed by Cate Dover RN on 2022 at 3:27 PM    Prior Authorization Form:      DEMOGRAPHICS:                     Patient Name:  Maria Luz Saavedra  Patient :  1945            Insurance:  Payor: peerTransfer / Plan: [employer] / Product Type: *No Product type* /   Insurance ID Number:    Payor/Plan Subscr  Sex Relation Sub. Ins. ID Effective Group Num   1.  [address] [facility] A 1945 Male Self [id number]                                    [id number]         DIAGNOSIS & PROCEDURE:                       Procedure/Operation:  Mediport insertion           CPT Code: 47047    Diagnosis:  Poor venous access    ICD10 Code: I87.8     Location:  [address] [place] [address]    Surgeon:  See Phan INFORMATION:                          Date: 22    Time: TBD              Anesthesia:  MAC/TIVA                                                       Status:  Outpatient        Special Comments:         Electronically signed by Cate Dover RN on 2022 at 3:31 PM

## 2022-02-14 RX ORDER — SODIUM CHLORIDE, SODIUM LACTATE, POTASSIUM CHLORIDE, CALCIUM CHLORIDE 600; 310; 30; 20 MG/100ML; MG/100ML; MG/100ML; MG/100ML
INJECTION, SOLUTION INTRAVENOUS CONTINUOUS
Status: CANCELLED | OUTPATIENT
Start: 2022-02-22

## 2022-02-15 ENCOUNTER — HOSPITAL ENCOUNTER (OUTPATIENT)
Dept: PREADMISSION TESTING | Age: 77
Discharge: HOME | End: 2022-02-15
Payer: MEDICARE

## 2022-02-15 VITALS
SYSTOLIC BLOOD PRESSURE: 129 MMHG | WEIGHT: 216 LBS | DIASTOLIC BLOOD PRESSURE: 70 MMHG | HEART RATE: 72 BPM | BODY MASS INDEX: 31.99 KG/M2 | OXYGEN SATURATION: 99 % | TEMPERATURE: 97 F | HEIGHT: 69 IN | RESPIRATION RATE: 18 BRPM

## 2022-02-15 DIAGNOSIS — I87.8 POOR VENOUS ACCESS: Primary | ICD-10-CM

## 2022-02-15 LAB
BASOPHILS ABSOLUTE: 0 E9/L (ref 0–0.2)
BASOPHILS RELATIVE PERCENT: 0.7 % (ref 0–2)
EKG ATRIAL RATE: 66 BPM
EKG P AXIS: -12 DEGREES
EKG P-R INTERVAL: 176 MS
EKG Q-T INTERVAL: 432 MS
EKG QRS DURATION: 94 MS
EKG QTC CALCULATION (BAZETT): 452 MS
EKG R AXIS: -36 DEGREES
EKG T AXIS: -20 DEGREES
EKG VENTRICULAR RATE: 66 BPM
EOSINOPHILS ABSOLUTE: 0.12 E9/L (ref 0.05–0.5)
EOSINOPHILS RELATIVE PERCENT: 2.6 % (ref 0–6)
HCT VFR BLD CALC: 32.2 % (ref 37–54)
HEMOGLOBIN: 11.2 G/DL (ref 12.5–16.5)
LYMPHOCYTES ABSOLUTE: 0.69 E9/L (ref 1.5–4)
LYMPHOCYTES RELATIVE PERCENT: 14.8 % (ref 20–42)
MCH RBC QN AUTO: 32.8 PG (ref 26–35)
MCHC RBC AUTO-ENTMCNC: 34.8 % (ref 32–34.5)
MCV RBC AUTO: 94.4 FL (ref 80–99.9)
MONOCYTES ABSOLUTE: 0.14 E9/L (ref 0.1–0.95)
MONOCYTES RELATIVE PERCENT: 2.6 % (ref 2–12)
NEUTROPHILS ABSOLUTE: 3.68 E9/L (ref 1.8–7.3)
NEUTROPHILS RELATIVE PERCENT: 80 % (ref 43–80)
PDW BLD-RTO: 13.2 FL (ref 11.5–15)
PLATELET # BLD: 97 E9/L (ref 130–450)
PLATELET CONFIRMATION: NORMAL
PMV BLD AUTO: 9.9 FL (ref 7–12)
RBC # BLD: 3.41 E12/L (ref 3.8–5.8)
WBC # BLD: 4.6 E9/L (ref 4.5–11.5)

## 2022-02-15 PROCEDURE — 85025 COMPLETE CBC W/AUTO DIFF WBC: CPT

## 2022-02-15 PROCEDURE — 93005 ELECTROCARDIOGRAM TRACING: CPT | Performed by: ANESTHESIOLOGY

## 2022-02-15 PROCEDURE — 36415 COLL VENOUS BLD VENIPUNCTURE: CPT

## 2022-02-15 RX ORDER — ONDANSETRON 4 MG/1
4 TABLET, ORALLY DISINTEGRATING ORAL EVERY 8 HOURS PRN
COMMUNITY
End: 2022-07-25

## 2022-02-15 NOTE — PROGRESS NOTES
[address] [facility]                                                                                                                    PRE OP INSTRUCTIONS FOR  [facility]        Date: 2/15/2022    Date of surgery: 2/22/22   Arrival Time: Hospital will call you between 5pm and 7pm with your final arrival time for surgery    1. Do not eat or drink anything after midnight prior to surgery. This includes no water, chewing gum, mints or ice chips. 2. Take the following medications with a small sip of water on the morning of Surgery: nasal spray     3. Diabetics may take evening dose of insulin but none after midnight. If you feel symptomatic or low blood sugar morning of surgery drink 1-2 ounces of apple juice only. 4. Aspirin, Ibuprofen, Advil, Naproxen, Vitamin E and other Anti-inflammatory products should be stopped  before surgery  as directed by your physician. Take Tylenol only unless instructed otherwise by your surgeon. 5. Check with your Doctor regarding stopping Plavix, Coumadin, Lovenox, Eliquis, Effient, or other blood thinners. 6. Do not smoke,use illicit drugs and do not drink any alcoholic beverages 24 hours prior to surgery. 7. You may brush your teeth the morning of surgery. DO NOT SWALLOW WATER    8. You MUST make arrangements for a responsible adult to take you home after your surgery. You will not be allowed to leave alone or drive yourself home. It is strongly suggested someone stay with you the first 24 hrs. Your surgery will be cancelled if you do not have a ride home. 9. Please wear simple, loose fitting clothing to the hospital.  Audery Ion not bring valuables (money, credit cards, checkbooks, etc.) Do not wear any makeup (including no eye makeup) or nail polish on your fingers or toes. 10. DO NOT wear any jewelry or piercings on day of surgery. All body piercing jewelry must be removed. 11.  Shower the night before surgery with ___Antibacterial soap 12. If you have a Living Will and Durable Power of  for Healthcare, please bring in a copy. 13. If appropriate bring crutches, inspirex, WALKER, CANE etc...    15. Notify your Surgeon if you develop any illness between now and surgery time, cough, cold, fever, sore throat, nausea, vomiting, etc.  Please notify your surgeon if you experience dizziness, shortness of breath or blurred vision between now & the time of your surgery. 15. If you have ___dentures, they will be removed before going to the OR; we will provide you a container. If you wear ___contact lenses or ___glasses, they will be removed; please bring a case for them. 16. To provide excellent care visitors will be limited to 1 in the room at any given time. 16. During flu season no children under the age of 15 are permitted in the hospital for the safety of all patients. 18. Other                  Please call AMBULATORY CARE if you have any further questions.    [address] [facility]     [address]

## 2022-02-16 NOTE — PROGRESS NOTES
Faxed cbcdiff to dr Armani Nix and dr Mendez Fleeting offices, confirmations received. Notified dr Shantelle Sheldon of platelets, ok per dr Shantelle Sheldon.

## 2022-02-21 ENCOUNTER — HOSPITAL ENCOUNTER (OUTPATIENT)
Dept: MRI IMAGING | Age: 77
Discharge: HOME | End: 2022-02-23
Payer: MEDICARE

## 2022-02-21 ENCOUNTER — HOSPITAL ENCOUNTER (OUTPATIENT)
Dept: CT IMAGING | Age: 77
Discharge: HOME | End: 2022-02-21
Payer: MEDICARE

## 2022-02-21 ENCOUNTER — ANESTHESIA EVENT (OUTPATIENT)
Dept: OPERATING ROOM | Age: 77
End: 2022-02-21
Payer: MEDICARE

## 2022-02-21 DIAGNOSIS — C7A.1 MALIGNANT POORLY DIFFERENTIATED NEUROENDOCRINE CARCINOMA (HCC): ICD-10-CM

## 2022-02-21 DIAGNOSIS — C61 MALIGNANT NEOPLASM OF PROSTATE (HCC): ICD-10-CM

## 2022-02-21 DIAGNOSIS — C79.52 SECONDARY MALIGNANT NEOPLASM OF BONE AND BONE MARROW (HCC): ICD-10-CM

## 2022-02-21 DIAGNOSIS — C79.51 SECONDARY MALIGNANT NEOPLASM OF BONE AND BONE MARROW (HCC): ICD-10-CM

## 2022-02-21 LAB
ALBUMIN SERPL-MCNC: 4.2 G/DL (ref 3.5–5.2)
ALP BLD-CCNC: 108 U/L (ref 40–129)
ALT SERPL-CCNC: 40 U/L (ref 0–40)
ANION GAP SERPL CALCULATED.3IONS-SCNC: 8 MMOL/L (ref 7–16)
AST SERPL-CCNC: 24 U/L (ref 0–39)
BASOPHILS ABSOLUTE: 0 E9/L (ref 0–0.2)
BASOPHILS RELATIVE PERCENT: 0 % (ref 0–2)
BILIRUB SERPL-MCNC: 0.7 MG/DL (ref 0–1.2)
BUN BLDV-MCNC: 15 MG/DL (ref 6–23)
CALCIUM SERPL-MCNC: 9.7 MG/DL (ref 8.6–10.2)
CHLORIDE BLD-SCNC: 101 MMOL/L (ref 98–107)
CO2: 26 MMOL/L (ref 22–29)
CREAT SERPL-MCNC: 1 MG/DL (ref 0.7–1.2)
EOSINOPHILS ABSOLUTE: 0.1 E9/L (ref 0.05–0.5)
EOSINOPHILS RELATIVE PERCENT: 1 % (ref 0–6)
GFR AFRICAN AMERICAN: >60
GFR NON-AFRICAN AMERICAN: >60 ML/MIN/1.73
GLUCOSE BLD-MCNC: 93 MG/DL (ref 74–99)
HCT VFR BLD CALC: 36.4 % (ref 37–54)
HEMOGLOBIN: 12.7 G/DL (ref 12.5–16.5)
HYPOCHROMIA: ABNORMAL
INR BLD: 1.2
LYMPHOCYTES ABSOLUTE: 1.21 E9/L (ref 1.5–4)
LYMPHOCYTES RELATIVE PERCENT: 12 % (ref 20–42)
MCH RBC QN AUTO: 33.2 PG (ref 26–35)
MCHC RBC AUTO-ENTMCNC: 34.9 % (ref 32–34.5)
MCV RBC AUTO: 95.3 FL (ref 80–99.9)
METAMYELOCYTES RELATIVE PERCENT: 1 % (ref 0–1)
MONOCYTES ABSOLUTE: 0.61 E9/L (ref 0.1–0.95)
MONOCYTES RELATIVE PERCENT: 6 % (ref 2–12)
MYELOCYTE PERCENT: 1 % (ref 0–0)
NEUTROPHILS ABSOLUTE: 8.18 E9/L (ref 1.8–7.3)
NEUTROPHILS RELATIVE PERCENT: 79 % (ref 43–80)
NUCLEATED RED BLOOD CELLS: 1 /100 WBC
PDW BLD-RTO: 14.5 FL (ref 11.5–15)
PLATELET # BLD: 146 E9/L (ref 130–450)
PMV BLD AUTO: 9.1 FL (ref 7–12)
POLYCHROMASIA: ABNORMAL
POTASSIUM SERPL-SCNC: 5 MMOL/L (ref 3.5–5)
PROTHROMBIN TIME: 14.2 SEC (ref 9.3–12.4)
RBC # BLD: 3.82 E12/L (ref 3.8–5.8)
SODIUM BLD-SCNC: 135 MMOL/L (ref 132–146)
TOTAL PROTEIN: 7.4 G/DL (ref 6.4–8.3)
WBC # BLD: 10.1 E9/L (ref 4.5–11.5)

## 2022-02-21 PROCEDURE — 80053 COMPREHEN METABOLIC PANEL: CPT

## 2022-02-21 PROCEDURE — A9577 INJ MULTIHANCE: HCPCS | Performed by: RADIOLOGY

## 2022-02-21 PROCEDURE — 85025 COMPLETE CBC W/AUTO DIFF WBC: CPT

## 2022-02-21 PROCEDURE — 71250 CT THORAX DX C-: CPT

## 2022-02-21 PROCEDURE — 36415 COLL VENOUS BLD VENIPUNCTURE: CPT

## 2022-02-21 PROCEDURE — 70553 MRI BRAIN STEM W/O & W/DYE: CPT

## 2022-02-21 PROCEDURE — 6360000004 HC RX CONTRAST MEDICATION: Performed by: RADIOLOGY

## 2022-02-21 PROCEDURE — 85610 PROTHROMBIN TIME: CPT

## 2022-02-21 RX ADMIN — GADOBENATE DIMEGLUMINE 20 ML: 529 INJECTION, SOLUTION INTRAVENOUS at 13:49

## 2022-02-21 ASSESSMENT — LIFESTYLE VARIABLES: SMOKING_STATUS: 0

## 2022-02-22 ENCOUNTER — HOSPITAL ENCOUNTER (OUTPATIENT)
Age: 77
Setting detail: OUTPATIENT SURGERY
Discharge: HOME | End: 2022-02-22
Attending: SURGERY | Admitting: SURGERY
Payer: MEDICARE

## 2022-02-22 ENCOUNTER — APPOINTMENT (OUTPATIENT)
Dept: GENERAL RADIOLOGY | Age: 77
End: 2022-02-22
Attending: SURGERY
Payer: MEDICARE

## 2022-02-22 ENCOUNTER — TELEPHONE (OUTPATIENT)
Dept: SURGERY | Age: 77
End: 2022-02-22

## 2022-02-22 ENCOUNTER — ANESTHESIA (OUTPATIENT)
Dept: OPERATING ROOM | Age: 77
End: 2022-02-22
Payer: MEDICARE

## 2022-02-22 ENCOUNTER — HOSPITAL ENCOUNTER (OUTPATIENT)
Dept: GENERAL RADIOLOGY | Age: 77
Discharge: HOME | End: 2022-02-24
Attending: SURGERY
Payer: MEDICARE

## 2022-02-22 VITALS
SYSTOLIC BLOOD PRESSURE: 115 MMHG | RESPIRATION RATE: 18 BRPM | HEART RATE: 70 BPM | TEMPERATURE: 97 F | DIASTOLIC BLOOD PRESSURE: 69 MMHG | OXYGEN SATURATION: 98 %

## 2022-02-22 VITALS
SYSTOLIC BLOOD PRESSURE: 128 MMHG | RESPIRATION RATE: 22 BRPM | DIASTOLIC BLOOD PRESSURE: 74 MMHG | OXYGEN SATURATION: 99 %

## 2022-02-22 DIAGNOSIS — I87.8 POOR VENOUS ACCESS: ICD-10-CM

## 2022-02-22 PROCEDURE — 3700000000 HC ANESTHESIA ATTENDED CARE: Performed by: SURGERY

## 2022-02-22 PROCEDURE — 3209999900 FLUORO FOR SURGICAL PROCEDURES

## 2022-02-22 PROCEDURE — 77001 FLUOROGUIDE FOR VEIN DEVICE: CPT | Performed by: SURGERY

## 2022-02-22 PROCEDURE — 3600000003 HC SURGERY LEVEL 3 BASE: Performed by: SURGERY

## 2022-02-22 PROCEDURE — C1788 PORT, INDWELLING, IMP: HCPCS | Performed by: SURGERY

## 2022-02-22 PROCEDURE — 76937 US GUIDE VASCULAR ACCESS: CPT | Performed by: SURGERY

## 2022-02-22 PROCEDURE — 3700000001 HC ADD 15 MINUTES (ANESTHESIA): Performed by: SURGERY

## 2022-02-22 PROCEDURE — 71045 X-RAY EXAM CHEST 1 VIEW: CPT

## 2022-02-22 PROCEDURE — 36561 INSERT TUNNELED CV CATH: CPT | Performed by: SURGERY

## 2022-02-22 PROCEDURE — 2709999900 HC NON-CHARGEABLE SUPPLY: Performed by: SURGERY

## 2022-02-22 PROCEDURE — 6360000002 HC RX W HCPCS: Performed by: SURGERY

## 2022-02-22 PROCEDURE — 2500000003 HC RX 250 WO HCPCS: Performed by: NURSE ANESTHETIST, CERTIFIED REGISTERED

## 2022-02-22 PROCEDURE — 7100000010 HC PHASE II RECOVERY - FIRST 15 MIN: Performed by: SURGERY

## 2022-02-22 PROCEDURE — 7100000011 HC PHASE II RECOVERY - ADDTL 15 MIN: Performed by: SURGERY

## 2022-02-22 PROCEDURE — 2500000003 HC RX 250 WO HCPCS: Performed by: SURGERY

## 2022-02-22 PROCEDURE — 2580000003 HC RX 258: Performed by: ANESTHESIOLOGY

## 2022-02-22 PROCEDURE — 2580000003 HC RX 258: Performed by: NURSE ANESTHETIST, CERTIFIED REGISTERED

## 2022-02-22 PROCEDURE — 3600000013 HC SURGERY LEVEL 3 ADDTL 15MIN: Performed by: SURGERY

## 2022-02-22 PROCEDURE — 2580000003 HC RX 258: Performed by: SURGERY

## 2022-02-22 PROCEDURE — 6360000002 HC RX W HCPCS: Performed by: NURSE ANESTHETIST, CERTIFIED REGISTERED

## 2022-02-22 PROCEDURE — A4216 STERILE WATER/SALINE, 10 ML: HCPCS | Performed by: SURGERY

## 2022-02-22 DEVICE — SMART PORT CT SINGLE TITANIUM PORT SYSTEM WITH ATTACHABLE 8.0F X 66CM POLYURETHANE CATHETER AND 8 F INTRODUCER KIT (WITH VALVED INTRODUCER)
Type: IMPLANTABLE DEVICE | Site: CHEST | Status: FUNCTIONAL
Brand: SMART PORT CT

## 2022-02-22 RX ORDER — SODIUM CHLORIDE 9 MG/ML
INJECTION INTRAVENOUS PRN
Status: DISCONTINUED | OUTPATIENT
Start: 2022-02-22 | End: 2022-02-22 | Stop reason: ALTCHOICE

## 2022-02-22 RX ORDER — FENTANYL CITRATE 50 UG/ML
25 INJECTION, SOLUTION INTRAMUSCULAR; INTRAVENOUS EVERY 5 MIN PRN
Status: DISCONTINUED | OUTPATIENT
Start: 2022-02-22 | End: 2022-02-22 | Stop reason: HOSPADM

## 2022-02-22 RX ORDER — FENTANYL CITRATE 50 UG/ML
50 INJECTION, SOLUTION INTRAMUSCULAR; INTRAVENOUS EVERY 5 MIN PRN
Status: DISCONTINUED | OUTPATIENT
Start: 2022-02-22 | End: 2022-02-22 | Stop reason: HOSPADM

## 2022-02-22 RX ORDER — SODIUM CHLORIDE 0.9 % (FLUSH) 0.9 %
5-40 SYRINGE (ML) INJECTION PRN
Status: DISCONTINUED | OUTPATIENT
Start: 2022-02-22 | End: 2022-02-22 | Stop reason: HOSPADM

## 2022-02-22 RX ORDER — FENTANYL CITRATE 50 UG/ML
INJECTION, SOLUTION INTRAMUSCULAR; INTRAVENOUS PRN
Status: DISCONTINUED | OUTPATIENT
Start: 2022-02-22 | End: 2022-02-22 | Stop reason: SDUPTHER

## 2022-02-22 RX ORDER — SODIUM CHLORIDE 9 MG/ML
25 INJECTION, SOLUTION INTRAVENOUS PRN
Status: DISCONTINUED | OUTPATIENT
Start: 2022-02-22 | End: 2022-02-22 | Stop reason: HOSPADM

## 2022-02-22 RX ORDER — MEPERIDINE HYDROCHLORIDE 25 MG/ML
12.5 INJECTION INTRAMUSCULAR; INTRAVENOUS; SUBCUTANEOUS EVERY 5 MIN PRN
Status: DISCONTINUED | OUTPATIENT
Start: 2022-02-22 | End: 2022-02-22 | Stop reason: HOSPADM

## 2022-02-22 RX ORDER — HEPARIN SODIUM (PORCINE) LOCK FLUSH IV SOLN 100 UNIT/ML 100 UNIT/ML
SOLUTION INTRAVENOUS PRN
Status: DISCONTINUED | OUTPATIENT
Start: 2022-02-22 | End: 2022-02-22 | Stop reason: ALTCHOICE

## 2022-02-22 RX ORDER — PROPOFOL 10 MG/ML
INJECTION, EMULSION INTRAVENOUS CONTINUOUS PRN
Status: DISCONTINUED | OUTPATIENT
Start: 2022-02-22 | End: 2022-02-22 | Stop reason: SDUPTHER

## 2022-02-22 RX ORDER — LIDOCAINE HYDROCHLORIDE 20 MG/ML
INJECTION, SOLUTION INTRAVENOUS PRN
Status: DISCONTINUED | OUTPATIENT
Start: 2022-02-22 | End: 2022-02-22 | Stop reason: SDUPTHER

## 2022-02-22 RX ORDER — SODIUM CHLORIDE 0.9 % (FLUSH) 0.9 %
5-40 SYRINGE (ML) INJECTION EVERY 12 HOURS SCHEDULED
Status: DISCONTINUED | OUTPATIENT
Start: 2022-02-22 | End: 2022-02-22 | Stop reason: HOSPADM

## 2022-02-22 RX ORDER — GLYCOPYRROLATE 1 MG/5 ML
SYRINGE (ML) INTRAVENOUS PRN
Status: DISCONTINUED | OUTPATIENT
Start: 2022-02-22 | End: 2022-02-22 | Stop reason: SDUPTHER

## 2022-02-22 RX ORDER — PROPOFOL 10 MG/ML
INJECTION, EMULSION INTRAVENOUS PRN
Status: DISCONTINUED | OUTPATIENT
Start: 2022-02-22 | End: 2022-02-22 | Stop reason: SDUPTHER

## 2022-02-22 RX ORDER — SODIUM CHLORIDE 9 MG/ML
INJECTION, SOLUTION INTRAVENOUS CONTINUOUS
Status: DISCONTINUED | OUTPATIENT
Start: 2022-02-22 | End: 2022-02-22 | Stop reason: HOSPADM

## 2022-02-22 RX ORDER — CEFAZOLIN SODIUM 2 G/50ML
2000 SOLUTION INTRAVENOUS
Status: DISCONTINUED | OUTPATIENT
Start: 2022-02-22 | End: 2022-02-22 | Stop reason: HOSPADM

## 2022-02-22 RX ORDER — SODIUM CHLORIDE, SODIUM LACTATE, POTASSIUM CHLORIDE, CALCIUM CHLORIDE 600; 310; 30; 20 MG/100ML; MG/100ML; MG/100ML; MG/100ML
INJECTION, SOLUTION INTRAVENOUS CONTINUOUS
Status: DISCONTINUED | OUTPATIENT
Start: 2022-02-22 | End: 2022-02-22 | Stop reason: HOSPADM

## 2022-02-22 RX ORDER — SODIUM CHLORIDE, SODIUM LACTATE, POTASSIUM CHLORIDE, CALCIUM CHLORIDE 600; 310; 30; 20 MG/100ML; MG/100ML; MG/100ML; MG/100ML
INJECTION, SOLUTION INTRAVENOUS CONTINUOUS PRN
Status: DISCONTINUED | OUTPATIENT
Start: 2022-02-22 | End: 2022-02-22 | Stop reason: SDUPTHER

## 2022-02-22 RX ADMIN — Medication 0.2 MG: at 08:41

## 2022-02-22 RX ADMIN — SODIUM CHLORIDE, POTASSIUM CHLORIDE, SODIUM LACTATE AND CALCIUM CHLORIDE: 600; 310; 30; 20 INJECTION, SOLUTION INTRAVENOUS at 08:00

## 2022-02-22 RX ADMIN — PROPOFOL INJECTABLE EMULSION 40 MG: 10 INJECTION, EMULSION INTRAVENOUS at 08:38

## 2022-02-22 RX ADMIN — FENTANYL CITRATE 50 MCG: 50 INJECTION, SOLUTION INTRAMUSCULAR; INTRAVENOUS at 08:38

## 2022-02-22 RX ADMIN — PROPOFOL 100 MCG/KG/MIN: 10 INJECTION, EMULSION INTRAVENOUS at 08:38

## 2022-02-22 RX ADMIN — LIDOCAINE HYDROCHLORIDE 100 MG: 20 INJECTION, SOLUTION INTRAVENOUS at 08:38

## 2022-02-22 RX ADMIN — FENTANYL CITRATE 25 MCG: 50 INJECTION, SOLUTION INTRAMUSCULAR; INTRAVENOUS at 08:41

## 2022-02-22 RX ADMIN — FENTANYL CITRATE 25 MCG: 50 INJECTION, SOLUTION INTRAMUSCULAR; INTRAVENOUS at 08:39

## 2022-02-22 RX ADMIN — SODIUM CHLORIDE, POTASSIUM CHLORIDE, SODIUM LACTATE AND CALCIUM CHLORIDE: 600; 310; 30; 20 INJECTION, SOLUTION INTRAVENOUS at 08:34

## 2022-02-22 ASSESSMENT — PULMONARY FUNCTION TESTS
PIF_VALUE: 0
PIF_VALUE: 1
PIF_VALUE: 1
PIF_VALUE: 0
PIF_VALUE: 1
PIF_VALUE: 0
PIF_VALUE: 1
PIF_VALUE: 1
PIF_VALUE: 0
PIF_VALUE: 0
PIF_VALUE: 1
PIF_VALUE: 0
PIF_VALUE: 1
PIF_VALUE: 0
PIF_VALUE: 0

## 2022-02-22 ASSESSMENT — PAIN - FUNCTIONAL ASSESSMENT: PAIN_FUNCTIONAL_ASSESSMENT: 0-10

## 2022-02-22 ASSESSMENT — PAIN SCALES - GENERAL: PAINLEVEL_OUTOF10: 0

## 2022-02-22 NOTE — ANESTHESIA POSTPROCEDURE EVALUATION
Department of Anesthesiology  Postprocedure Note    Patient: Shira Montana  MRN: [id number]  YOB: 1945  Date of evaluation: 2/22/2022  Time:  11:49 AM     Procedure Summary     Date: 02/22/22 Room / Location: [address] [place] [address] / [facility]    Anesthesia Start: 0827 Anesthesia Stop: 7665    Procedure: [address] (N/A Chest) Diagnosis: (POOR VENOUS ACCESS)    Surgeons: Abad Peraza MD Responsible Provider: Francine Coon MD    Anesthesia Type: MAC ASA Status: 3          Anesthesia Type: MAC    Jacobo Phase I: Jacobo Score: 10    Jacobo Phase II: Jacobo Score: 10    Last vitals: Reviewed and per EMR flowsheets.        Anesthesia Post Evaluation    Patient location during evaluation: PACU  Patient participation: complete - patient participated  Level of consciousness: awake  Airway patency: patent  Nausea & Vomiting: no nausea and no vomiting  Complications: no  Cardiovascular status: hemodynamically stable  Respiratory status: acceptable  Hydration status: euvolemic

## 2022-02-22 NOTE — ANESTHESIA PRE PROCEDURE
Department of Anesthesiology  Preprocedure Note       Name:  Alethea Jones   Age:  64 y.o.  :  1945                                          MRN:  [id number]         Date:  2022      Surgeon: Sonia Finn):  Da Maki MD    Procedure: Procedure(s): MEDIPORT CATHETER INSERTION    Medications prior to admission:   Prior to Admission medications    Medication Sig Start Date End Date Taking?  Authorizing Provider   ondansetron (ZOFRAN-ODT) 4 MG disintegrating tablet Take 4 mg by mouth every 8 hours as needed for Nausea or Vomiting    Historical Provider, MD   tamsulosin (FLOMAX) 0.4 MG capsule TAKE TWO CAPSULES BY MOUTH DAILY AT BEDTIME 21   Maria Isabel Patel MD   pantoprazole (PROTONIX) 40 MG tablet TAKE 1 TABLET BY MOUTH IN  THE EVENING 21   Maria Isabel Patel MD   fluticasone (FLONASE) 50 MCG/ACT nasal spray 2 sprays by Each Nostril route daily 21   Maria Isabel Patel MD   Cholecalciferol (VITAMIN D3) 50 MCG (2000 UT) CAPS Take by mouth    Historical Provider, MD   Cyanocobalamin (B-12) 2500 MCG TABS Take 2,500 mg by mouth daily    Historical Provider, MD   Multiple Vitamins-Minerals (THERAPEUTIC MULTIVITAMIN-MINERALS) tablet Take 1 tablet by mouth daily    Historical Provider, MD   KRILL OIL PO Take 1 capsule by mouth daily    Historical Provider, MD   calcium carbonate (TUMS) 500 MG chewable tablet Take 2 tablets by mouth 3 times daily as needed for Heartburn    Historical Provider, MD       Current medications:    Current Facility-Administered Medications   Medication Dose Route Frequency Provider Last Rate Last Admin    0.9 % sodium chloride infusion   IntraVENous Continuous Da Maki MD        sodium chloride flush 0.9 % injection 5-40 mL  5-40 mL IntraVENous 2 times per day Da Maki MD        sodium chloride flush 0.9 % injection 5-40 mL  5-40 mL IntraVENous PRN Da Maki MD        0.9 % sodium chloride infusion  25 mL IntraVENous PRN [facility]        ceFAZolin (ANCEF) 2000 mg in dextrose 3 % 50 mL IVPB (duplex)  2,000 mg IntraVENous On Call to Tiffany Humphrey MD        lactated ringers infusion   IntraVENous Continuous Erling Jungling, DO 50 mL/hr at 22 0800 New Bag at 22 0800    sodium chloride (PF) 0.9 % injection    PRN Lieutenant Divine MD   10 mL at 22 0827    heparin flush 100 UNIT/ML injection    PRN Lieutenant Divine MD   5 mL at 22 0827    bupivacaine-EPINEPHrine (MARCAINE-w/EPINEPHRINE) 0.25% [id number] 10 mL, lidocaine 1 % 10 mL    PRN Lieutenant Divine MD   Given at 22 3201       Allergies:  No Known Allergies    Problem List:    Patient Active Problem List   Diagnosis Code    Hemorrhoid thrombosis K64.5    Prostate cancer ([facility] C61    Pernicious anemia D51.0    Hyperlipidemia E78.5    Gastroesophageal reflux disease without esophagitis K21.9    Acute sinusitis J01.90       Past Medical History:        Diagnosis Date    Acid reflux     Barrette esophagus     Gómez esophagus     BPH (benign prostatic hyperplasia)     [place] (hard of hearing)     bilateral hearing aids    Pernicious anemia        Past Surgical History:        Procedure Laterality Date    APPENDECTOMY      CARPAL TUNNEL RELEASE Right     COLONOSCOPY  2016    KNEE SURGERY Right     TONSILLECTOMY      US LYMPH NODE BIOPSY  2021    US LYMPH NODE BIOPSY 2021 [facility]       Social History:    Social History     Tobacco Use    Smoking status: Former Smoker     Packs/day: 1.00     Years: 5.00     Pack years: 5.00     Quit date: 1986     Years since quittin.2    Smokeless tobacco: Never Used   Substance Use Topics    Alcohol use:  No                                Counseling given: Not Answered      Vital Signs (Current):   Vitals:    22 0736   BP: 115/66   Pulse: 66   Resp: 16   Temp: 97.3 °F (36.3 °C)   TempSrc: Infrared   SpO2: 97% BP Readings from Last 3 Encounters:   02/22/22 115/66   02/15/22 129/70   12/20/21 122/80       NPO Status: Time of last liquid consumption: 1800                        Time of last solid consumption: 1800                        Date of last liquid consumption: 02/21/22                        Date of last solid food consumption: 02/21/22    BMI:   Wt Readings from Last 3 Encounters:   02/15/22 216 lb (98 kg)   12/20/21 216 lb (98 kg)   12/08/21 213 lb (96.6 kg)     There is no height or weight on file to calculate BMI.    CBC:   Lab Results   Component Value Date    WBC 10.1 02/21/2022    RBC 3.82 02/21/2022    HGB 12.7 02/21/2022    HCT 36.4 02/21/2022    MCV 95.3 02/21/2022    RDW 14.5 02/21/2022     02/21/2022       CMP:   Lab Results   Component Value Date     02/21/2022    K 5.0 02/21/2022    K 4.4 02/25/2020     02/21/2022    CO2 26 02/21/2022    BUN 15 02/21/2022    CREATININE 1.0 02/21/2022    CREATININE 1.2 12/30/2019    GFRAA >60 02/21/2022    LABGLOM >60 02/21/2022    GLUCOSE 93 02/21/2022    GLUCOSE 102 02/24/2012    PROT 7.4 02/21/2022    CALCIUM 9.7 02/21/2022    BILITOT 0.7 02/21/2022    ALKPHOS 108 02/21/2022    AST 24 02/21/2022    ALT 40 02/21/2022       POC Tests: No results for input(s): POCGLU, POCNA, POCK, POCCL, POCBUN, POCHEMO, POCHCT in the last 72 hours.     Coags:   Lab Results   Component Value Date    PROTIME 14.2 02/21/2022    INR 1.2 02/21/2022       HCG (If Applicable): No results found for: PREGTESTUR, PREGSERUM, HCG, HCGQUANT     ABGs: No results found for: PHART, PO2ART, TFB6XIJ, KST9QYW, BEART, T3BVODFR     Type & Screen (If Applicable):  No results found for: LABABO, LABRH    Drug/Infectious Status (If Applicable):  No results found for: HIV, HEPCAB    COVID-19 Screening (If Applicable):   Lab Results   Component Value Date    COVID19 NEG 02/26/2021           Anesthesia Evaluation  Patient summary reviewed no history of anesthetic complications:   Airway: Mallampati: II        Dental:          Pulmonary:       (-) not a current smoker                           Cardiovascular:Negative CV ROS            Rhythm: regular  Rate: normal                    Neuro/Psych:   Negative Neuro/Psych ROS              GI/Hepatic/Renal:   (+) GERD:,      (-) no morbid obesity       Endo/Other:    (+) malignancy/cancer (Prostate cancer). (-) blood dyscrasia (hx. Pernicious anemia), no electrolyte abnormalities               Abdominal:             Vascular: negative vascular ROS. Other Findings: [place] (hard of hearing) bilateral hearing aids           Anesthesia Plan      MAC     ASA 3       Induction: intravenous. Anesthetic plan and risks discussed with patient and spouse. Plan discussed with CRNA. PAT Chart Review:  Chart reviewed per routine by Francine Coon MD.  Above represents information available via shared medical record including previous anesthesia history, drug and allergy history. Confirmation of above and final plan per Day of Surgery (DOS) anesthesiologist.    DOS STAFF ADDENDUM:    Pt seen and examined, chart reviewed (including anesthesia, drug and allergy history). Anesthetic plan, risks, benefits, alternatives, and personnel involved discussed with patient. Patient verbalized an understanding and agrees to proceed. Plan discussed with care team members and agreed upon.     Francine Coon MD  Staff Anesthesiologist  8:31 AM    Francine Coon MD   2/22/2022

## 2022-02-22 NOTE — H&P
General Surgery History and Physical  [facility]    Patient's Name/Date of Birth: Eliana Hopkins / 1945    Date: 2022     Surgeon: Faustina Cantrell M.D.    PCP: Raymodn Navarro MD     Chief Complaint: Poor venous access, mediport placement    HPI:   Eliana Hopkins is a 64 y.o. male who presents for evaluation of poor venous access and mediport placement. Patient has a history of prostate cancer and plans to begin Chemotherapy as soon as possible. They need a central venous catheter placed prior to commencing treatment. Patient Active Problem List   Diagnosis    Hemorrhoid thrombosis    Prostate cancer ([facility] [address]    Pernicious anemia    Hyperlipidemia    Gastroesophageal reflux disease without esophagitis    Acute sinusitis       Past Medical History:   Diagnosis Date    Acid reflux     Barrette esophagus     Gómez esophagus     BPH (benign prostatic hyperplasia)     [place] (hard of hearing)     bilateral hearing aids    Pernicious anemia        Past Surgical History:   Procedure Laterality Date    APPENDECTOMY      CARPAL TUNNEL RELEASE Right     COLONOSCOPY  2016    KNEE SURGERY Right     TONSILLECTOMY      US LYMPH NODE BIOPSY  2021    US LYMPH NODE BIOPSY 2021 [facility]TR[facility]       No Known Allergies    The patient has a family history that is negative for severe cardiovascular or respiratory issues, negative for reaction to anesthesia.     Social History     Socioeconomic History    Marital status:      Spouse name: Not on file    Number of children: Not on file    Years of education: Not on file    Highest education level: Not on file   Occupational History    Not on file   Tobacco Use    Smoking status: Former Smoker     Packs/day: 1.00     Years: 5.00     Pack years: 5.00     Quit date: 1986     Years since quittin.2    Smokeless tobacco: Never Used   Substance and Sexual Activity    Alcohol use: No    Drug use: No    Sexual activity: Not on file   Other Topics Concern    Not on file   Social History Narrative    Not on file     Social Determinants of Health     Financial Resource Strain: Low Risk     Difficulty of Paying Living Expenses: Not hard at all   Food Insecurity: No Food Insecurity    Worried About Running Out of Food in the Last Year: Never true    [address] in the Last Year: Never true   Transportation Needs:     Lack of Transportation (Medical): Not on file    Lack of Transportation (Non-Medical): Not on file   Physical Activity:     Days of Exercise per Week: Not on file    Minutes of Exercise per Session: Not on file   Stress:     Feeling of Stress : Not on file   Social Connections:     Frequency of Communication with Friends and Family: Not on file    Frequency of Social Gatherings with Friends and Family: Not on file    Attends Muslim Services: Not on file    Active Member of [address] [place] [address] or Organizations: Not on file    Attends Club or Organization Meetings: Not on file    Marital Status: Not on file   Intimate Partner Violence:     Fear of Current or Ex-Partner: Not on file    Emotionally Abused: Not on file    Physically Abused: Not on file    Sexually Abused: Not on file   Housing Stability:     Unable to Pay for Housing in the Last Year: Not on file    Number of [place] in the Last Year: Not on file    Unstable Housing in the Last Year: Not on file           A complete 10 system review was performed and are otherwise negative unless mentioned in the above HPI. Specific negatives are listed below but may not include all those reviewed.     General ROS: negative obtundation, AMS  ENT ROS: negative rhinorrhea, epistaxis  Allergy and Immunology ROS: negative itchy/watery eyes or nasal congestion  Hematological and Lymphatic ROS: negative spontaneous bleeding or bruising  Endocrine ROS: negative  lethargy, mood swings, palpitations or polydipsia/polyuria  Respiratory ROS: negative sputum changes, stridor, tachypnea or wheezing  Cardiovascular ROS: negative for - loss of consciousness, murmur or orthopnea  Gastrointestinal ROS: negative for - hematochezia or hematemesis  Genito-Urinary ROS: negative for -  genital discharge or hematuria  Musculoskeletal ROS: negative for - focal weakness, gangrene  Psych/Neuro ROS: negative for - visual or auditory hallucinations, suicidal ideation      Physical exam:   /66   Pulse 66   Temp 97.3 °F (36.3 °C) (Infrared)   Resp 16   SpO2 97%   General appearance:  NAD  Head: NCAT, PERRLA, EOMI, red conjunctiva  Neck: supple, no masses, trachea midline, no previous scars visible  Lungs: Equal chest rise bilateral  Heart: Reg rate  Abdomen: soft, Nontender, Nondistended  Skin; no lesions  Gu: no cva tenderness  Extremities: extremities normal, atraumatic, no cyanosis or edema  Psych: No visual or auditory hallucinations, no suicidal ideation    Assessment:  64 y.o. male with prostate cancer, in need of tunneled central venous access    Plan:  Schedule OR for tunneled central venous catheter with subcutaneous port placement  Discussed the risk, benefits and alternatives of surgery including wound infections, bleeding, scar and pneumothorax and the risks of general anesthetic including MI, CVA, sudden death or reactions to anesthetic medications. The patient understands the risks and alternatives and the possibility of converting to an open procedure. All questions were answered to the patient's satisfaction and they freely signed the consent.        Mandi Hemphill MD  7:47 AM  2/22/2022

## 2022-02-22 NOTE — OP NOTE
[facility]   Operative Report    DATE OF PROCEDURE: 2/22/2022    SURGEON: Dr. Tiffany Salmon MD, M.D.     Shana CanavanKarsten Leos, MD, Kerrie Bashir MD     PREOPERATIVE DIAGNOSIS: Venous insufficiency (I87.2), Prostate Cancer    POSTOPERATIVE DIAGNOSIS: Same. OPERATION:  Insertion of central venous catheter with subcutaneous port. (11325); Fluoroscopy; Ultrasound guidance    ANESTHESIA: LMAC     ESTIMATED BLOOD LOSS: 2 cc    COMPLICATIONS: None. HISTORY: Chris Banks is a  64 y.o.  male who has poor venous access and prostate cancer with need for access to receive chemotherapy. OPERATION: The patient was placed on the table in a supine position. He  was given Ancef 1 gram IV preop. The skin was prepped with ChloroPrep and draped in the usual fashion. The patient was placed in a headdown position. Ultrasound was used to find the left internal jugular vein. The skin was numbed with marcaine plus epinephrine. The vein was canulated and the wire passed easily. The puncture skin site was enlarged with a 15 blade scalpel. The chest skin was numbed with lidocaine and an incision was made for the port. Cautery was used to dissect down and form a pocket. Tunneling device was attached to the catheter and tunneled from the neck to the chest incision. The introducer dilator was passed over the wire and the catheter was passed through the introducer so the tip was in the superior vena cava using fluoroscopy. The catheter was cut to the correct length under fluoroscopic guidance. The port was attached to the catheter and placed into the pocket. Sutures were placed in the chest fascia and clamped with a hemostat. The sutures holding the port to the fascia were tied. A Wood needle was used to access the port and flush with saline. The catheter nicanor back blood well. It was then flushed with Heplock solution. The dermis was closed with 4-0 Monocryl.  Derma+flex glue was placed on the incision, no dressing. The needle and sponge count were correct. The patient tolerated the procedure well and went to recovery in stable condition. A chest X-ray was ordered to check for catheter placement.     Physician Signature: Electronically signed by Dr. Darci Jones MD, M.D.

## 2022-02-22 NOTE — TELEPHONE ENCOUNTER
Mediport insertion post-operative x ray reports received. Per findings, no left pneumothorax. Impression states \"there is a pneumothorax\". Attempted to reach radiology to request review by Dr. Tosin Camara, unable to reach staff. Fax sent to radiology dept. For results review and request for correction.    Electronically signed by Jeanmarie Vazquez RN on 2/22/2022 at 10:00 AM

## 2022-02-28 LAB
BASOPHILS ABSOLUTE: 60 /ΜL
BASOPHILS RELATIVE PERCENT: 1 %
EOSINOPHILS ABSOLUTE: 30 /ΜL
EOSINOPHILS RELATIVE PERCENT: 0.5 %
HCT VFR BLD CALC: 34.2 % (ref 41–53)
HEMOGLOBIN: 11.9 G/DL (ref 13.5–17.5)
LYMPHOCYTES ABSOLUTE: 1152 /ΜL
LYMPHOCYTES RELATIVE PERCENT: 19.2 %
MCH RBC QN AUTO: 33 PG
MCHC RBC AUTO-ENTMCNC: 34.8 G/DL
MCV RBC AUTO: 94.7 FL
MONOCYTES ABSOLUTE: 582 /ΜL
MONOCYTES RELATIVE PERCENT: 9.7 %
NEUTROPHILS ABSOLUTE: 4176 /ΜL
NEUTROPHILS RELATIVE PERCENT: 69.6 %
PDW BLD-RTO: 15.2 %
PLATELET # BLD: 216 K/ΜL
PMV BLD AUTO: 8.2 FL
RBC # BLD: 3.61 10^6/ΜL
WBC # BLD: 6 10^3/ML

## 2022-03-30 ENCOUNTER — OFFICE VISIT (OUTPATIENT)
Dept: FAMILY MEDICINE CLINIC | Age: 77
End: 2022-03-30
Payer: MEDICARE

## 2022-03-30 VITALS
OXYGEN SATURATION: 96 % | SYSTOLIC BLOOD PRESSURE: 114 MMHG | WEIGHT: 210.4 LBS | HEIGHT: 69 IN | DIASTOLIC BLOOD PRESSURE: 68 MMHG | BODY MASS INDEX: 31.16 KG/M2 | HEART RATE: 76 BPM | TEMPERATURE: 97.6 F

## 2022-03-30 DIAGNOSIS — C61 PRIMARY PROSTATE CANCER WITH METASTASIS FROM PROSTATE TO OTHER SITE (HCC): ICD-10-CM

## 2022-03-30 DIAGNOSIS — D51.0 PERNICIOUS ANEMIA: ICD-10-CM

## 2022-03-30 DIAGNOSIS — Z76.89 ESTABLISHING CARE WITH NEW DOCTOR, ENCOUNTER FOR: Primary | ICD-10-CM

## 2022-03-30 DIAGNOSIS — K21.9 GASTROESOPHAGEAL REFLUX DISEASE WITHOUT ESOPHAGITIS: ICD-10-CM

## 2022-03-30 PROCEDURE — 1036F TOBACCO NON-USER: CPT | Performed by: INTERNAL MEDICINE

## 2022-03-30 PROCEDURE — G8417 CALC BMI ABV UP PARAM F/U: HCPCS | Performed by: INTERNAL MEDICINE

## 2022-03-30 PROCEDURE — 1123F ACP DISCUSS/DSCN MKR DOCD: CPT | Performed by: INTERNAL MEDICINE

## 2022-03-30 PROCEDURE — G8484 FLU IMMUNIZE NO ADMIN: HCPCS | Performed by: INTERNAL MEDICINE

## 2022-03-30 PROCEDURE — 4040F PNEUMOC VAC/ADMIN/RCVD: CPT | Performed by: INTERNAL MEDICINE

## 2022-03-30 PROCEDURE — 99214 OFFICE O/P EST MOD 30 MIN: CPT | Performed by: INTERNAL MEDICINE

## 2022-03-30 PROCEDURE — G8427 DOCREV CUR MEDS BY ELIG CLIN: HCPCS | Performed by: INTERNAL MEDICINE

## 2022-03-30 RX ORDER — ENZALUTAMIDE 40 MG/1
40 CAPSULE ORAL DAILY
COMMUNITY

## 2022-03-30 RX ORDER — EPINEPHRINE 0.3 MG/.3ML
0.3 INJECTION SUBCUTANEOUS ONCE
Qty: 0.3 ML | Refills: 0 | Status: SHIPPED | OUTPATIENT
Start: 2022-03-30 | End: 2022-10-31

## 2022-03-30 ASSESSMENT — ENCOUNTER SYMPTOMS
NAUSEA: 0
ABDOMINAL PAIN: 0
SHORTNESS OF BREATH: 0
EYE DISCHARGE: 0
BLOOD IN STOOL: 0

## 2022-03-30 ASSESSMENT — PATIENT HEALTH QUESTIONNAIRE - PHQ9
SUM OF ALL RESPONSES TO PHQ QUESTIONS 1-9: 0
SUM OF ALL RESPONSES TO PHQ QUESTIONS 1-9: 0
1. LITTLE INTEREST OR PLEASURE IN DOING THINGS: 0
SUM OF ALL RESPONSES TO PHQ QUESTIONS 1-9: 0
SUM OF ALL RESPONSES TO PHQ9 QUESTIONS 1 & 2: 0
2. FEELING DOWN, DEPRESSED OR HOPELESS: 0
SUM OF ALL RESPONSES TO PHQ QUESTIONS 1-9: 0

## 2022-03-30 NOTE — PROGRESS NOTES
Chief Complaint   Patient presents with   Alex Guevara PCP Dr[facility], Specialist: Dr. Ebony Ku [facility]) Dr. Coco Elmore (GI) , Dr. Alexia Arrington (eye)     Hyperlipidemia    Prostate Cancer     Getting Chemo on 4th round has 3 more to go   [facility] Maintenance     due for AWV/ Pneumonia vaccine        HPI:  Patient is here as new patient    Pt was having Left groin pain, LE edema  Has rising PSA  Seen Dr Jessie Ku, Bx, Prostate cancer. Had Seeds implant at [facility]    Later developed swelling in groin- Enlarged LN and also in Abd  Has sclerotic lesion on Spine    Started with Chemo by Dr Ebony Ku- q 3 weeks    Swelling in groin, legs- better  Also getting B12 inj from Dr Ebony Ku for pernicious anemia    Over all feeling ok    Allergy and Medications are reviewed and updated. Past Medical History, Surgical History, and Family History has been reviewed and updated. Review of Systems:  Review of Systems   Constitutional: Negative for chills and fever. HENT: Negative for congestion and ear pain. Eyes: Negative for discharge. Respiratory: Negative for shortness of breath (No new SOB). Cardiovascular: Negative for chest pain and leg swelling. Gastrointestinal: Negative for abdominal pain, blood in stool and nausea. Endocrine: Negative for polydipsia. Genitourinary: Negative for flank pain and hematuria. Musculoskeletal: Negative for myalgias and neck pain. Skin: Negative for rash. Neurological: Negative for dizziness and seizures. Hematological: Does not bruise/bleed easily. Psychiatric/Behavioral: Negative for hallucinations and suicidal ideas. Vitals:    03/30/22 1533   BP: 114/68   Position: Sitting   Pulse: 76   Temp: 97.6 °F (36.4 °C)   TempSrc: Temporal   SpO2: 96%   Weight: 210 lb 6.4 oz (95.4 kg)   Height: 5' 9\" (1.753 m)       Physical Exam  Vitals reviewed. Constitutional:       Appearance: He is well-developed. HENT:      Head: Normocephalic and atraumatic. Eyes:      Conjunctiva/sclera: Conjunctivae normal.      Pupils: Pupils are equal, round, and reactive to light. Neck:      Vascular: No JVD. Cardiovascular:      Rate and Rhythm: Normal rate and regular rhythm. Pulmonary:      Effort: Pulmonary effort is normal.      Breath sounds: Normal breath sounds. No rales. Abdominal:      General: Bowel sounds are normal.      Palpations: Abdomen is soft. Musculoskeletal:         General: Normal range of motion. Lymphadenopathy:      Cervical: No cervical adenopathy. Skin:     General: Skin is warm and dry. Neurological:      Mental Status: He is alert and oriented to person, place, and time.    Psychiatric:         Behavior: Behavior normal.          Labs :    Lab Results   Component Value Date    WBC 10.1 02/21/2022    HGB 12.7 02/21/2022    HCT 36.4 (L) 02/21/2022     02/21/2022    CHOL 175 12/07/2021    TRIG 68 12/07/2021    HDL 45 12/07/2021    ALT 40 02/21/2022    AST 24 02/21/2022     02/21/2022    K 5.0 02/21/2022     02/21/2022    CREATININE 1.0 02/21/2022    BUN 15 02/21/2022    CO2 26 02/21/2022    TSH 1.530 08/17/2021    PSA 10.20 (H) 12/07/2021    INR 1.2 02/21/2022    GLUF 98 10/08/2020    LABA1C 4.9 12/07/2021     Lab Results   Component Value Date    COLORU Yellow 06/28/2017    NITRU Negative 06/28/2017    GLUCOSEU NEG 02/16/2021    GLUCOSEU Negative 06/28/2017    KETUA NEG 02/16/2021    KETUA Negative 06/28/2017    UROBILINOGEN 0.2 06/28/2017    BILIRUBINUR NEG 02/16/2021     Lab Results   Component Value Date    PSA 10.20 12/07/2021             ASSESSMENT     Patient Active Problem List    Diagnosis Date Noted    Gastroesophageal reflux disease without esophagitis 08/24/2021    Acute sinusitis 08/24/2021    Prostate cancer ([facility] [address] 08/04/2021    Pernicious anemia 08/04/2021    Hyperlipidemia 08/04/2021    Hemorrhoid thrombosis 11/05/2016        Diagnosis:     ICD-10-CM    1. Establishing care with new doctor, encounter for  Z76.89    2. Primary prostate cancer with metastasis from prostate to other site [facility])  [address] Paige Dr [facility]   3. Pernicious anemia  D51.0    4. Gastroesophageal reflux disease without esophagitis  K21.9        PLAN:   Recent labs [facility]  [facility] and his [facility] - note from [employer] noted    Pt is stable on current medical treatment. Continue current treatment plan    Side effects/Adverse effects/Precautions are reviewed with patient. Low salt, Low Carb diet an low fat diet  Continue medications as advised and take them regularly  Regular exercises as advised    Discussed natural and expected course of this diagnosis and need to alert me if symptoms do not follow expected course, or if any worse. Smoking cessation if applicable, discussed with patient. PS- pt reports that he may be allergic to Bees  Epi-pen Rx is sent       Patient Instructions   The medication list included in this document is our record of what you are currently taking, including any changes that were made at today's visit.  If you find any differences when compared to your medications at home, or have any questions that were not answered at your visit, please contact the office. Return in about 2 months (around 6/9/2022).

## 2022-07-25 ENCOUNTER — OFFICE VISIT (OUTPATIENT)
Dept: FAMILY MEDICINE CLINIC | Age: 77
End: 2022-07-25
Payer: MEDICARE

## 2022-07-25 VITALS
DIASTOLIC BLOOD PRESSURE: 72 MMHG | OXYGEN SATURATION: 98 % | WEIGHT: 208.6 LBS | HEART RATE: 69 BPM | SYSTOLIC BLOOD PRESSURE: 120 MMHG | BODY MASS INDEX: 30.9 KG/M2 | TEMPERATURE: 98 F | HEIGHT: 69 IN

## 2022-07-25 DIAGNOSIS — Z00.00 INITIAL MEDICARE ANNUAL WELLNESS VISIT: ICD-10-CM

## 2022-07-25 DIAGNOSIS — K21.9 GASTROESOPHAGEAL REFLUX DISEASE, UNSPECIFIED WHETHER ESOPHAGITIS PRESENT: Primary | ICD-10-CM

## 2022-07-25 PROCEDURE — 1123F ACP DISCUSS/DSCN MKR DOCD: CPT | Performed by: INTERNAL MEDICINE

## 2022-07-25 PROCEDURE — G0438 PPPS, INITIAL VISIT: HCPCS | Performed by: INTERNAL MEDICINE

## 2022-07-25 RX ORDER — PANTOPRAZOLE SODIUM 40 MG/1
40 TABLET, DELAYED RELEASE ORAL DAILY
Qty: 90 TABLET | Refills: 1 | Status: SHIPPED | OUTPATIENT
Start: 2022-07-25 | End: 2022-10-31

## 2022-07-25 RX ORDER — AMOXICILLIN 500 MG/1
TABLET, FILM COATED ORAL
COMMUNITY
Start: 2022-07-22 | End: 2022-10-31

## 2022-07-25 ASSESSMENT — PATIENT HEALTH QUESTIONNAIRE - PHQ9
SUM OF ALL RESPONSES TO PHQ QUESTIONS 1-9: 0
SUM OF ALL RESPONSES TO PHQ9 QUESTIONS 1 & 2: 0
2. FEELING DOWN, DEPRESSED OR HOPELESS: 0
SUM OF ALL RESPONSES TO PHQ QUESTIONS 1-9: 0
SUM OF ALL RESPONSES TO PHQ QUESTIONS 1-9: 0
1. LITTLE INTEREST OR PLEASURE IN DOING THINGS: 0
SUM OF ALL RESPONSES TO PHQ QUESTIONS 1-9: 0

## 2022-07-25 ASSESSMENT — LIFESTYLE VARIABLES
HOW MANY STANDARD DRINKS CONTAINING ALCOHOL DO YOU HAVE ON A TYPICAL DAY: PATIENT DOES NOT DRINK
HOW OFTEN DO YOU HAVE A DRINK CONTAINING ALCOHOL: NEVER

## 2022-07-25 NOTE — PATIENT INSTRUCTIONS
Personalized Preventive Plan for Adina Kimbrough - 7/25/2022  Medicare offers a range of preventive health benefits. Some of the tests and screenings are paid in full while other may be subject to a deductible, co-insurance, and/or copay. Some of these benefits include a comprehensive review of your medical history including lifestyle, illnesses that may run in your family, and various assessments and screenings as appropriate. After reviewing your medical record and screening and assessments performed today your provider may have ordered immunizations, labs, imaging, and/or referrals for you. A list of these orders (if applicable) as well as your Preventive Care list are included within your After Visit Summary for your review. Other Preventive Recommendations:    A preventive eye exam performed by an eye specialist is recommended every 1-2 years to screen for glaucoma; cataracts, macular degeneration, and other eye disorders. A preventive dental visit is recommended every 6 months. Try to get at least 150 minutes of exercise per week or 10,000 steps per day on a pedometer . Order or download the FREE \"Exercise & Physical Activity: Your Everyday Guide\" from [employer]. Call 4-272.438.5959 or search [employer] online. You need 8440-9855 mg of calcium and 2515-9218 IU of vitamin D per day. It is possible to meet your calcium requirement with diet alone, but a vitamin D supplement is usually necessary to meet this goal.  When exposed to the sun, use a sunscreen that protects against both UVA and UVB radiation with an SPF of 30 or greater. Reapply every 2 to 3 hours or after sweating, drying off with a towel, or swimming. Always wear a seat belt when traveling in a car. Always wear a helmet when riding a bicycle or motorcycle.

## 2022-07-25 NOTE — PROGRESS NOTES
Medicare Annual Wellness Visit    [place] is here for Medicare AWV, Medication Refill, and Health Maintenance (Pneumonia vaccine, Hep C screening )    Assessment & Plan   Gastroesophageal reflux disease, unspecified whether esophagitis present  -     pantoprazole (PROTONIX) 40 MG tablet; Take 1 tablet by mouth in the morning., Disp-90 tablet, R-1Requesting 1 year supplyNormal  Initial Medicare annual wellness visit    Recommendations for Preventive Services Due: see orders and patient instructions/AVS.  Recommended screening schedule for the next 5-10 years is provided to the patient in written form: see Patient Instructions/AVS.     Return in 1 year (on 7/25/2023) for Medicare Annual Wellness Visit in 1 year. Subjective       Patient's complete Health Risk Assessment and screening values have been reviewed and are found in Flowsheets. The following problems were reviewed today and where indicated follow up appointments were made and/or referrals ordered. Positive Risk Factor Screenings with Interventions:              Health Habits/Nutrition:  Physical Activity: Insufficiently Active    Days of Exercise per Week: 3 days    Minutes of Exercise per Session: 30 min     Have you lost any weight without trying in the past 3 months?: (!) Yes (Going through Chemo)  Body mass index: (!) 30.8  Have you seen the dentist within the past year?: Yes  Health Habits/Nutrition Interventions:  Nutritional issues:  educational materials for healthy, well-balanced diet provided             Objective   Vitals:    07/25/22 1633   BP: 120/72   Position: Sitting   Pulse: 69   Temp: 98 °F (36.7 °C)   TempSrc: Temporal   SpO2: 98%   Weight: 208 lb 9.6 oz (94.6 kg)   Height: 5' 9\" (1.753 m)      Body mass index is 30.8 kg/m². No Known Allergies  Prior to Visit Medications    Medication Sig Taking?  Authorizing Provider   Amoxicillin 500 MG TABS TO START TAKE 1 TABLET BY MOUTH EVERY 8 HOURS. (20 TABLETS) THEN TAKE 4 TABLETS 1 HOUR PRIOR TO APPOINTMENT FOLLOWED BY 1 TABLET EVERY 8 MARLYS Yes Historical Provider, MD   pantoprazole (PROTONIX) 40 MG tablet Take 1 tablet by mouth in the morning.  Yes Colletta Meier, MD   enzalutamide Chely Kennedy) 40 MG capsule Take 40 mg by mouth daily Yes Historical Provider, MD   tamsulosin (FLOMAX) 0.4 MG capsule TAKE TWO CAPSULES BY MOUTH DAILY AT BEDTIME Yes Maykel Gann MD   Cholecalciferol (VITAMIN D3) 50 MCG (2000 UT) CAPS Take by mouth Yes Historical Provider, MD   Cyanocobalamin (B-12) 2500 MCG TABS Take 2,500 mg by mouth daily Yes Historical Provider, MD   Multiple Vitamins-Minerals (THERAPEUTIC MULTIVITAMIN-MINERALS) tablet Take 1 tablet by mouth daily Yes Historical Provider, MD   KRILL OIL PO Take 1 capsule by mouth daily Yes Historical Provider, MD   calcium carbonate (TUMS) 500 MG chewable tablet Take 2 tablets by mouth 3 times daily as needed for Heartburn Yes Historical Provider, MD   EPINEPHrine (EPIPEN 2-YULIYA) 0.3 MG/0.3ML SOAJ injection Inject 0.3 mLs into the muscle once for 1 dose Use as directed for allergic reaction  Colletta Meier, MD       [facility] (Including outside providers/suppliers regularly involved in providing care):   Patient Care Team:  Colletta Meier, MD as PCP - General (Internal Medicine)  Colletta Meier, MD as PCP - [facility] Empaneled Provider  Raad Cochran MD as Surgeon (General Surgery)     Reviewed and updated this visit:  Tobacco  Allergies  Meds  Problems  Med Hx  Surg Hx  Soc Hx  Fam Hx

## 2022-10-31 ENCOUNTER — OFFICE VISIT (OUTPATIENT)
Dept: FAMILY MEDICINE CLINIC | Age: 77
End: 2022-10-31
Payer: MEDICARE

## 2022-10-31 VITALS
SYSTOLIC BLOOD PRESSURE: 120 MMHG | OXYGEN SATURATION: 99 % | TEMPERATURE: 97.4 F | HEART RATE: 88 BPM | HEIGHT: 69 IN | BODY MASS INDEX: 31.43 KG/M2 | DIASTOLIC BLOOD PRESSURE: 70 MMHG | WEIGHT: 212.2 LBS

## 2022-10-31 DIAGNOSIS — C79.51 PROSTATE CANCER METASTATIC TO BONE (HCC): ICD-10-CM

## 2022-10-31 DIAGNOSIS — E78.5 HYPERLIPIDEMIA, UNSPECIFIED HYPERLIPIDEMIA TYPE: ICD-10-CM

## 2022-10-31 DIAGNOSIS — K21.9 GASTROESOPHAGEAL REFLUX DISEASE WITHOUT ESOPHAGITIS: Primary | ICD-10-CM

## 2022-10-31 DIAGNOSIS — Z11.59 NEED FOR HEPATITIS C SCREENING TEST: ICD-10-CM

## 2022-10-31 DIAGNOSIS — K21.9 GASTROESOPHAGEAL REFLUX DISEASE, UNSPECIFIED WHETHER ESOPHAGITIS PRESENT: ICD-10-CM

## 2022-10-31 DIAGNOSIS — Z23 NEED FOR PNEUMOCOCCAL VACCINE: ICD-10-CM

## 2022-10-31 DIAGNOSIS — C61 PROSTATE CANCER METASTATIC TO BONE (HCC): ICD-10-CM

## 2022-10-31 PROCEDURE — 99214 OFFICE O/P EST MOD 30 MIN: CPT | Performed by: INTERNAL MEDICINE

## 2022-10-31 PROCEDURE — 90732 PPSV23 VACC 2 YRS+ SUBQ/IM: CPT | Performed by: INTERNAL MEDICINE

## 2022-10-31 PROCEDURE — G0009 ADMIN PNEUMOCOCCAL VACCINE: HCPCS | Performed by: INTERNAL MEDICINE

## 2022-10-31 PROCEDURE — 1123F ACP DISCUSS/DSCN MKR DOCD: CPT | Performed by: INTERNAL MEDICINE

## 2022-10-31 RX ORDER — FINASTERIDE 5 MG/1
TABLET, FILM COATED ORAL
COMMUNITY
Start: 2022-10-20

## 2022-10-31 SDOH — ECONOMIC STABILITY: FOOD INSECURITY: WITHIN THE PAST 12 MONTHS, YOU WORRIED THAT YOUR FOOD WOULD RUN OUT BEFORE YOU GOT MONEY TO BUY MORE.: NEVER TRUE

## 2022-10-31 SDOH — ECONOMIC STABILITY: FOOD INSECURITY: WITHIN THE PAST 12 MONTHS, THE FOOD YOU BOUGHT JUST DIDN'T LAST AND YOU DIDN'T HAVE MONEY TO GET MORE.: NEVER TRUE

## 2022-10-31 ASSESSMENT — ENCOUNTER SYMPTOMS
SHORTNESS OF BREATH: 0
NAUSEA: 0
EYE DISCHARGE: 0
BLOOD IN STOOL: 0
ABDOMINAL PAIN: 0

## 2022-10-31 ASSESSMENT — SOCIAL DETERMINANTS OF HEALTH (SDOH): HOW HARD IS IT FOR YOU TO PAY FOR THE VERY BASICS LIKE FOOD, HOUSING, MEDICAL CARE, AND HEATING?: NOT VERY HARD

## 2022-10-31 NOTE — PROGRESS NOTES
Chief Complaint   Patient presents with    Hyperlipidemia     Here for 3 month follow up, pt reports feeling pretty good. Health Maintenance     Pneumonia vaccine, Hep C screening        HPI:  Patient is here for follow-up     Feeling okay    Following with Heme Onc  [facility]     Allergy and Medications are reviewed and updated. Past Medical History, Surgical History, and Family History has been reviewed and updated. Review of Systems:  Review of Systems   Constitutional:  Negative for chills and fever. HENT:  Negative for congestion and ear pain. Eyes:  Negative for discharge. Respiratory:  Negative for shortness of breath (No new SOB). Cardiovascular:  Negative for chest pain and leg swelling. Gastrointestinal:  Negative for abdominal pain, blood in stool and nausea. Endocrine: Negative for polydipsia. Genitourinary:  Negative for flank pain and hematuria. Musculoskeletal:  Negative for myalgias and neck pain. Skin:  Negative for rash. Neurological:  Negative for dizziness and seizures. Hematological:  Does not bruise/bleed easily. Psychiatric/Behavioral:  Negative for hallucinations and suicidal ideas. Vitals:    10/31/22 1703   BP: 120/70   Position: Sitting   Pulse: 88   Temp: 97.4 °F (36.3 °C)   TempSrc: Temporal   SpO2: 99%   Weight: 212 lb 3.2 oz (96.3 kg)   Height: 5' 9\" (1.753 m)       Physical Exam  Vitals reviewed. Constitutional:       Appearance: He is well-developed. HENT:      Head: Normocephalic and atraumatic. Eyes:      Conjunctiva/sclera: Conjunctivae normal.      Pupils: Pupils are equal, round, and reactive to light. Neck:      Vascular: No JVD. Cardiovascular:      Rate and Rhythm: Normal rate and regular rhythm. Pulmonary:      Effort: Pulmonary effort is normal.      Breath sounds: Normal breath sounds. Abdominal:      General: Bowel sounds are normal.      Palpations: Abdomen is soft.    Musculoskeletal:         General: Normal range of motion. Skin:     General: Skin is warm and dry. Neurological:      Mental Status: He is alert and oriented to person, place, and time. Psychiatric:         Behavior: Behavior normal.        Labs :    Lab Results   Component Value Date    WBC 6.0 02/28/2022    HGB 11.9 (A) 02/28/2022    HCT 34.2 (A) 02/28/2022     02/28/2022    CHOL 175 12/07/2021    TRIG 68 12/07/2021    HDL 45 12/07/2021    ALT 40 02/21/2022    AST 24 02/21/2022     02/21/2022    K 5.0 02/21/2022     02/21/2022    CREATININE 1.0 02/21/2022    BUN 15 02/21/2022    CO2 26 02/21/2022    TSH 1.530 08/17/2021    PSA 10.20 (H) 12/07/2021    INR 1.2 02/21/2022    GLUF 98 10/08/2020    LABA1C 4.9 12/07/2021     Lab Results   Component Value Date/Time    COLORU Yellow 06/28/2017 11:00 AM    NITRU Negative 06/28/2017 11:00 AM    GLUCOSEU NEG 02/16/2021 03:40 PM    GLUCOSEU Negative 06/28/2017 11:00 AM    KETUA NEG 02/16/2021 03:40 PM    KETUA Negative 06/28/2017 11:00 AM    UROBILINOGEN 0.2 06/28/2017 11:00 AM    BILIRUBINUR NEG 02/16/2021 03:40 PM     Lab Results   Component Value Date/Time    PSA 10.20 12/07/2021 12:00 PM             ASSESSMENT     Patient Active Problem List    Diagnosis Date Noted    Gastroesophageal reflux disease without esophagitis 08/24/2021    Acute sinusitis 08/24/2021    Prostate cancer ([facility] [address] 08/04/2021    Pernicious anemia 08/04/2021    Hyperlipidemia 08/04/2021    Hemorrhoid thrombosis 11/05/2016        Diagnosis:   1. Gastroesophageal reflux disease without esophagitis  -     CBC with Auto Differential; Future  2. Prostate cancer metastatic to bone ([facility] [address]  3. Gastroesophageal reflux disease, unspecified whether esophagitis present  4. Hyperlipidemia, unspecified hyperlipidemia type  -     Comprehensive Metabolic Panel, Fasting; Future  -     Lipid, Fasting; Future  -     TSH; Future  -     Urinalysis with Microscopic; Future  5.  Need for hepatitis C screening test  -     Hepatitis C Antibody; Future  6. Need for pneumococcal vaccine  -     Pneumococcal, PPSV23, PNEUMOVAX 23, (age 2 yrs+), SC/IM       PLAN:       Advise to have ( Fasting) lab test prior to next visit. Pt is stable on current medical treatment. Continue current treatment plan    Side effects/Adverse effects/Precautions are reviewed with patient. Low salt, Low Carb diet an low fat diet  Continue medications as advised and take them regularly  Regular exercises as advised    Discussed natural and expected course of this diagnosis and need to alert me if symptoms do not follow expected course, or if any worse. Smoking cessation if applicable, discussed with patient. Pneumo-23     There are no Patient Instructions on file for this visit. Return in about 3 months (around 1/19/2023).

## 2022-12-09 DIAGNOSIS — K21.9 GASTROESOPHAGEAL REFLUX DISEASE, UNSPECIFIED WHETHER ESOPHAGITIS PRESENT: ICD-10-CM

## 2022-12-12 RX ORDER — PANTOPRAZOLE SODIUM 40 MG/1
40 TABLET, DELAYED RELEASE ORAL DAILY
Qty: 90 TABLET | Refills: 3 | OUTPATIENT
Start: 2022-12-12 | End: 2023-03-12

## 2022-12-13 DIAGNOSIS — K21.9 GASTROESOPHAGEAL REFLUX DISEASE, UNSPECIFIED WHETHER ESOPHAGITIS PRESENT: ICD-10-CM

## 2022-12-13 RX ORDER — PANTOPRAZOLE SODIUM 40 MG/1
40 TABLET, DELAYED RELEASE ORAL DAILY
Qty: 90 TABLET | Refills: 1 | Status: SHIPPED | OUTPATIENT
Start: 2022-12-13 | End: 2023-03-13

## 2022-12-13 NOTE — TELEPHONE ENCOUNTER
Lov: 97-60-02  Nov: 01-23-23      Merlinda Rooks stopped in the office.  Requested refill on pantoprazole 40 mg qd #90 day supply w/refills    [facility]

## 2022-12-16 DIAGNOSIS — K21.9 GASTROESOPHAGEAL REFLUX DISEASE WITHOUT ESOPHAGITIS: ICD-10-CM

## 2022-12-16 DIAGNOSIS — Z11.59 NEED FOR HEPATITIS C SCREENING TEST: ICD-10-CM

## 2022-12-16 DIAGNOSIS — E78.5 HYPERLIPIDEMIA, UNSPECIFIED HYPERLIPIDEMIA TYPE: ICD-10-CM

## 2022-12-16 LAB
ALBUMIN SERPL-MCNC: 4.3 G/DL (ref 3.5–5.2)
ALP BLD-CCNC: 54 U/L (ref 40–129)
ALT SERPL-CCNC: 13 U/L (ref 0–40)
ANION GAP SERPL CALCULATED.3IONS-SCNC: 13 MMOL/L (ref 7–16)
AST SERPL-CCNC: 24 U/L (ref 0–39)
BASOPHILS ABSOLUTE: 0.03 E9/L (ref 0–0.2)
BASOPHILS RELATIVE PERCENT: 1 % (ref 0–2)
BILIRUB SERPL-MCNC: 0.6 MG/DL (ref 0–1.2)
BUN BLDV-MCNC: 21 MG/DL (ref 6–23)
CALCIUM SERPL-MCNC: 10.2 MG/DL (ref 8.6–10.2)
CHLORIDE BLD-SCNC: 107 MMOL/L (ref 98–107)
CHOLESTEROL, FASTING: 205 MG/DL (ref 0–199)
CO2: 24 MMOL/L (ref 22–29)
CREAT SERPL-MCNC: 1.1 MG/DL (ref 0.7–1.2)
EOSINOPHILS ABSOLUTE: 0.09 E9/L (ref 0.05–0.5)
EOSINOPHILS RELATIVE PERCENT: 3 % (ref 0–6)
GFR SERPL CREATININE-BSD FRML MDRD: >60 ML/MIN/1.73
GLUCOSE FASTING: 100 MG/DL (ref 74–99)
HCT VFR BLD CALC: 32.7 % (ref 37–54)
HDLC SERPL-MCNC: 54 MG/DL
HEMOGLOBIN: 11.5 G/DL (ref 12.5–16.5)
IMMATURE GRANULOCYTES #: 0.01 E9/L
IMMATURE GRANULOCYTES %: 0.3 % (ref 0–5)
LDL CHOLESTEROL CALCULATED: 125 MG/DL (ref 0–99)
LYMPHOCYTES ABSOLUTE: 1.03 E9/L (ref 1.5–4)
LYMPHOCYTES RELATIVE PERCENT: 34.4 % (ref 20–42)
MCH RBC QN AUTO: 34.5 PG (ref 26–35)
MCHC RBC AUTO-ENTMCNC: 35.2 % (ref 32–34.5)
MCV RBC AUTO: 98.2 FL (ref 80–99.9)
MONOCYTES ABSOLUTE: 0.28 E9/L (ref 0.1–0.95)
MONOCYTES RELATIVE PERCENT: 9.4 % (ref 2–12)
NEUTROPHILS ABSOLUTE: 1.55 E9/L (ref 1.8–7.3)
NEUTROPHILS RELATIVE PERCENT: 51.9 % (ref 43–80)
PDW BLD-RTO: 14.8 FL (ref 11.5–15)
PLATELET # BLD: 186 E9/L (ref 130–450)
PMV BLD AUTO: 9.6 FL (ref 7–12)
POTASSIUM SERPL-SCNC: 4.9 MMOL/L (ref 3.5–5)
RBC # BLD: 3.33 E12/L (ref 3.8–5.8)
SODIUM BLD-SCNC: 144 MMOL/L (ref 132–146)
TOTAL PROTEIN: 7.5 G/DL (ref 6.4–8.3)
TRIGLYCERIDE, FASTING: 132 MG/DL (ref 0–149)
TSH SERPL DL<=0.05 MIU/L-ACNC: 2.12 UIU/ML (ref 0.27–4.2)
VLDLC SERPL CALC-MCNC: 26 MG/DL
WBC # BLD: 3 E9/L (ref 4.5–11.5)

## 2022-12-19 LAB — HEPATITIS C ANTIBODY INTERPRETATION: NORMAL

## 2022-12-21 DIAGNOSIS — E78.5 HYPERLIPIDEMIA, UNSPECIFIED HYPERLIPIDEMIA TYPE: ICD-10-CM

## 2022-12-21 DIAGNOSIS — K21.9 GASTROESOPHAGEAL REFLUX DISEASE, UNSPECIFIED WHETHER ESOPHAGITIS PRESENT: ICD-10-CM

## 2022-12-21 LAB
BACTERIA: ABNORMAL /HPF
BILIRUBIN URINE: NEGATIVE
BLOOD, URINE: NEGATIVE
CLARITY: CLEAR
COLOR: YELLOW
GLUCOSE URINE: NEGATIVE MG/DL
KETONES, URINE: NEGATIVE MG/DL
LEUKOCYTE ESTERASE, URINE: NEGATIVE
NITRITE, URINE: NEGATIVE
PH UA: 6 (ref 5–9)
PROTEIN UA: NEGATIVE MG/DL
RBC UA: ABNORMAL /HPF (ref 0–2)
SPECIFIC GRAVITY UA: 1.01 (ref 1–1.03)
UROBILINOGEN, URINE: 0.2 E.U./DL
WBC UA: ABNORMAL /HPF (ref 0–5)

## 2022-12-21 RX ORDER — PANTOPRAZOLE SODIUM 40 MG/1
40 TABLET, DELAYED RELEASE ORAL DAILY
Qty: 90 TABLET | Refills: 1 | Status: SHIPPED | OUTPATIENT
Start: 2022-12-21 | End: 2023-03-21

## 2022-12-21 NOTE — TELEPHONE ENCOUNTER
Last seen 10/31/2022  Next appt 1/23/2023     Prescription was sent to the wrong pharmacy can you please re send to [address]

## 2023-01-23 ENCOUNTER — OFFICE VISIT (OUTPATIENT)
Dept: FAMILY MEDICINE CLINIC | Age: 78
End: 2023-01-23
Payer: MEDICARE

## 2023-01-23 VITALS
HEART RATE: 88 BPM | WEIGHT: 219.4 LBS | SYSTOLIC BLOOD PRESSURE: 120 MMHG | TEMPERATURE: 97.7 F | OXYGEN SATURATION: 100 % | DIASTOLIC BLOOD PRESSURE: 74 MMHG | HEIGHT: 69 IN | BODY MASS INDEX: 32.5 KG/M2

## 2023-01-23 DIAGNOSIS — E78.5 HYPERLIPIDEMIA, UNSPECIFIED HYPERLIPIDEMIA TYPE: ICD-10-CM

## 2023-01-23 DIAGNOSIS — C61 PROSTATE CANCER METASTATIC TO BONE (HCC): ICD-10-CM

## 2023-01-23 DIAGNOSIS — C79.51 PROSTATE CANCER METASTATIC TO BONE (HCC): ICD-10-CM

## 2023-01-23 DIAGNOSIS — K21.9 GASTROESOPHAGEAL REFLUX DISEASE WITHOUT ESOPHAGITIS: ICD-10-CM

## 2023-01-23 DIAGNOSIS — R10.9 RIGHT FLANK PAIN: Primary | ICD-10-CM

## 2023-01-23 PROCEDURE — G8427 DOCREV CUR MEDS BY ELIG CLIN: HCPCS | Performed by: INTERNAL MEDICINE

## 2023-01-23 PROCEDURE — G8417 CALC BMI ABV UP PARAM F/U: HCPCS | Performed by: INTERNAL MEDICINE

## 2023-01-23 PROCEDURE — 1036F TOBACCO NON-USER: CPT | Performed by: INTERNAL MEDICINE

## 2023-01-23 PROCEDURE — G8484 FLU IMMUNIZE NO ADMIN: HCPCS | Performed by: INTERNAL MEDICINE

## 2023-01-23 PROCEDURE — 1123F ACP DISCUSS/DSCN MKR DOCD: CPT | Performed by: INTERNAL MEDICINE

## 2023-01-23 PROCEDURE — 99214 OFFICE O/P EST MOD 30 MIN: CPT | Performed by: INTERNAL MEDICINE

## 2023-01-23 RX ORDER — ONDANSETRON HYDROCHLORIDE 8 MG/1
TABLET, FILM COATED ORAL
COMMUNITY

## 2023-01-23 RX ORDER — FLUTICASONE PROPIONATE 50 MCG
1 SPRAY, SUSPENSION (ML) NASAL DAILY
Qty: 1 EACH | Refills: 2 | Status: SHIPPED | OUTPATIENT
Start: 2023-01-23

## 2023-01-23 ASSESSMENT — ENCOUNTER SYMPTOMS
NAUSEA: 0
ABDOMINAL PAIN: 0
EYE DISCHARGE: 0
BLOOD IN STOOL: 0
SHORTNESS OF BREATH: 0

## 2023-01-23 ASSESSMENT — PATIENT HEALTH QUESTIONNAIRE - PHQ9
1. LITTLE INTEREST OR PLEASURE IN DOING THINGS: 0
SUM OF ALL RESPONSES TO PHQ QUESTIONS 1-9: 0
2. FEELING DOWN, DEPRESSED OR HOPELESS: 0
SUM OF ALL RESPONSES TO PHQ QUESTIONS 1-9: 0
SUM OF ALL RESPONSES TO PHQ9 QUESTIONS 1 & 2: 0
SUM OF ALL RESPONSES TO PHQ QUESTIONS 1-9: 0
SUM OF ALL RESPONSES TO PHQ QUESTIONS 1-9: 0

## 2023-01-23 NOTE — PROGRESS NOTES
Chief Complaint   Patient presents with    Flank Pain     C/o pain in right side radiating into back x 3 weeks using Aleve. Discuss Labs     Review labs from 12/16/2022       HPI:  Patient is here for follow-up     C/o rt flank pain for about a month     Had CT scan of abd and chest 1/11/23    Pain increase with certain movement     Pt is following with Dr Gray Simons and Medications are reviewed and updated. Past Medical History, Surgical History, and Family History has been reviewed and updated. Review of Systems:  Review of Systems   Constitutional:  Negative for chills and fever. HENT:  Negative for congestion and ear pain. Eyes:  Negative for discharge. Respiratory:  Negative for shortness of breath (No new SOB). Cardiovascular:  Negative for chest pain and leg swelling. Gastrointestinal:  Negative for abdominal pain, blood in stool and nausea. Endocrine: Negative for polydipsia. Genitourinary:  Negative for flank pain and hematuria. Musculoskeletal:  Negative for myalgias and neck pain. Skin:  Negative for rash. Neurological:  Negative for dizziness and seizures. Hematological:  Does not bruise/bleed easily. Psychiatric/Behavioral:  Negative for hallucinations and suicidal ideas. Vitals:    01/23/23 1606   BP: 120/74   Position: Sitting   Pulse: 88   Temp: 97.7 °F (36.5 °C)   TempSrc: Temporal   SpO2: 100%   Weight: 219 lb 6.4 oz (99.5 kg)   Height: 5' 9\" (1.753 m)       Physical Exam  Vitals reviewed. Constitutional:       Appearance: He is well-developed. HENT:      Head: Normocephalic and atraumatic. Eyes:      Conjunctiva/sclera: Conjunctivae normal.      Pupils: Pupils are equal, round, and reactive to light. Neck:      Vascular: No JVD. Cardiovascular:      Rate and Rhythm: Normal rate and regular rhythm. Pulmonary:      Effort: Pulmonary effort is normal.      Breath sounds: Normal breath sounds.    Abdominal:      General: Bowel sounds are normal.      Palpations: Abdomen is soft. Comments: ? Rt CVA tenderness ? ? Musculoskeletal:         General: Normal range of motion. Skin:     General: Skin is warm and dry. Neurological:      Mental Status: He is alert and oriented to person, place, and time. Psychiatric:         Behavior: Behavior normal.        Labs :    Lab Results   Component Value Date    WBC 3.0 (L) 12/16/2022    HGB 11.5 (L) 12/16/2022    HCT 32.7 (L) 12/16/2022     12/16/2022    CHOL 175 12/07/2021    TRIG 68 12/07/2021    HDL 54 12/16/2022    ALT 13 12/16/2022    AST 24 12/16/2022     12/16/2022    K 4.9 12/16/2022     12/16/2022    CREATININE 1.1 12/16/2022    BUN 21 12/16/2022    CO2 24 12/16/2022    TSH 2.120 12/16/2022    PSA 10.20 (H) 12/07/2021    INR 1.2 02/21/2022    GLUF 100 (H) 12/16/2022    LABA1C 4.9 12/07/2021     Lab Results   Component Value Date/Time    COLORU Yellow 12/21/2022 02:36 PM    NITRU Negative 12/21/2022 02:36 PM    GLUCOSEU Negative 12/21/2022 02:36 PM    KETUA Negative 12/21/2022 02:36 PM    UROBILINOGEN 0.2 12/21/2022 02:36 PM    BILIRUBINUR Negative 12/21/2022 02:36 PM    BILIRUBINUR NEG 02/16/2021 03:40 PM     Lab Results   Component Value Date/Time    PSA 10.20 12/07/2021 12:00 PM             ASSESSMENT     Patient Active Problem List    Diagnosis Date Noted    Gastroesophageal reflux disease without esophagitis 08/24/2021    Acute sinusitis 08/24/2021    Prostate cancer ([address] 08/04/2021    Pernicious anemia 08/04/2021    Hyperlipidemia 08/04/2021    Hemorrhoid thrombosis 11/05/2016        Diagnosis:   1. Right flank pain  -     Urinalysis with Microscopic; Future  -     US RETROPERITONEAL COMPLETE; Future  -     XR THORACIC SPINE (MIN 4 VIEWS); Future  2. Prostate cancer metastatic to bone ([address]  3. Hyperlipidemia, unspecified hyperlipidemia type  4.  Gastroesophageal reflux disease without esophagitis       PLAN:     Pt could not give Urine today    Will check UA, Renal US and XR T spine    Once we have result will f/u accordingly      Will check with his Oncologist also    Pt is stable on current medical treatment. Continue current treatment plan    Side effects/Adverse effects/Precautions are reviewed with patient. Low salt, Low Carb diet an low fat diet  Continue medications as advised and take them regularly  Regular exercises as advised    Discussed natural and expected course of this diagnosis and need to alert me if symptoms do not follow expected course, or if any worse. Smoking cessation if applicable, discussed with patient. There are no Patient Instructions on file for this visit. Return in about 1 month (around 2/23/2023).

## 2023-02-06 ENCOUNTER — OFFICE VISIT (OUTPATIENT)
Dept: FAMILY MEDICINE CLINIC | Age: 78
End: 2023-02-06
Payer: MEDICARE

## 2023-02-06 VITALS
SYSTOLIC BLOOD PRESSURE: 126 MMHG | OXYGEN SATURATION: 98 % | HEART RATE: 77 BPM | DIASTOLIC BLOOD PRESSURE: 72 MMHG | TEMPERATURE: 98.3 F | WEIGHT: 216.5 LBS | HEIGHT: 69 IN | BODY MASS INDEX: 32.07 KG/M2

## 2023-02-06 DIAGNOSIS — R10.9 RIGHT FLANK PAIN: Primary | ICD-10-CM

## 2023-02-06 DIAGNOSIS — C61 PROSTATE CANCER METASTATIC TO BONE (HCC): ICD-10-CM

## 2023-02-06 DIAGNOSIS — C79.51 PROSTATE CANCER METASTATIC TO BONE (HCC): ICD-10-CM

## 2023-02-06 DIAGNOSIS — E78.5 HYPERLIPIDEMIA, UNSPECIFIED HYPERLIPIDEMIA TYPE: ICD-10-CM

## 2023-02-06 DIAGNOSIS — K21.9 GASTROESOPHAGEAL REFLUX DISEASE, UNSPECIFIED WHETHER ESOPHAGITIS PRESENT: ICD-10-CM

## 2023-02-06 PROCEDURE — 1123F ACP DISCUSS/DSCN MKR DOCD: CPT | Performed by: INTERNAL MEDICINE

## 2023-02-06 PROCEDURE — G8417 CALC BMI ABV UP PARAM F/U: HCPCS | Performed by: INTERNAL MEDICINE

## 2023-02-06 PROCEDURE — G8427 DOCREV CUR MEDS BY ELIG CLIN: HCPCS | Performed by: INTERNAL MEDICINE

## 2023-02-06 PROCEDURE — G8484 FLU IMMUNIZE NO ADMIN: HCPCS | Performed by: INTERNAL MEDICINE

## 2023-02-06 PROCEDURE — 1036F TOBACCO NON-USER: CPT | Performed by: INTERNAL MEDICINE

## 2023-02-06 PROCEDURE — 99214 OFFICE O/P EST MOD 30 MIN: CPT | Performed by: INTERNAL MEDICINE

## 2023-02-06 RX ORDER — IBUPROFEN 800 MG/1
800 TABLET ORAL EVERY 8 HOURS PRN
Qty: 40 TABLET | Refills: 1 | Status: SHIPPED | OUTPATIENT
Start: 2023-02-06

## 2023-02-06 SDOH — ECONOMIC STABILITY: FOOD INSECURITY: WITHIN THE PAST 12 MONTHS, YOU WORRIED THAT YOUR FOOD WOULD RUN OUT BEFORE YOU GOT MONEY TO BUY MORE.: NEVER TRUE

## 2023-02-06 SDOH — ECONOMIC STABILITY: INCOME INSECURITY: HOW HARD IS IT FOR YOU TO PAY FOR THE VERY BASICS LIKE FOOD, HOUSING, MEDICAL CARE, AND HEATING?: NOT HARD AT ALL

## 2023-02-06 SDOH — ECONOMIC STABILITY: FOOD INSECURITY: WITHIN THE PAST 12 MONTHS, THE FOOD YOU BOUGHT JUST DIDN'T LAST AND YOU DIDN'T HAVE MONEY TO GET MORE.: NEVER TRUE

## 2023-02-06 SDOH — ECONOMIC STABILITY: HOUSING INSECURITY
IN THE LAST 12 MONTHS, WAS THERE A TIME WHEN YOU DID NOT HAVE A STEADY PLACE TO SLEEP OR SLEPT IN A SHELTER (INCLUDING NOW)?: NO

## 2023-02-06 ASSESSMENT — ENCOUNTER SYMPTOMS
BLOOD IN STOOL: 0
SHORTNESS OF BREATH: 0
ABDOMINAL PAIN: 0
NAUSEA: 0
EYE DISCHARGE: 0

## 2023-02-06 NOTE — PROGRESS NOTES
Chief Complaint   Patient presents with    Nephrolithiasis     C/o kidney stone pt states he is in a lot of pain. No nausea no vomiting          HPI:  Patient is here for follow-up     Has rt flank pain- Not improving     Pain is mod to severe    Has an appt with Heme onc    US- Renal - non obstructive 8 mm stone    XR t spine - multilevel DJD    Allergy and Medications are reviewed and updated. Past Medical History, Surgical History, and Family History has been reviewed and updated. Review of Systems:  Review of Systems   Constitutional:  Negative for chills and fever. HENT:  Negative for congestion and ear pain. Eyes:  Negative for discharge. Respiratory:  Negative for shortness of breath (No new SOB). Cardiovascular:  Negative for chest pain and leg swelling. Gastrointestinal:  Negative for abdominal pain, blood in stool and nausea. Endocrine: Negative for polydipsia. Genitourinary:  Negative for flank pain and hematuria. Musculoskeletal:  Negative for myalgias and neck pain. Skin:  Negative for rash. Neurological:  Negative for dizziness and seizures. Hematological:  Does not bruise/bleed easily. Psychiatric/Behavioral:  Negative for hallucinations and suicidal ideas. Vitals:    02/06/23 1144   BP: 126/72   Position: Sitting   Pulse: 77   Temp: 98.3 °F (36.8 °C)   TempSrc: Temporal   SpO2: 98%   Weight: 216 lb 8 oz (98.2 kg)   Height: 5' 9\" (1.753 m)       Physical Exam  Vitals reviewed. Constitutional:       Appearance: He is well-developed. HENT:      Head: Normocephalic and atraumatic. Eyes:      Conjunctiva/sclera: Conjunctivae normal.      Pupils: Pupils are equal, round, and reactive to light. Neck:      Vascular: No JVD. Cardiovascular:      Rate and Rhythm: Normal rate and regular rhythm. Pulmonary:      Effort: Pulmonary effort is normal.      Breath sounds: Normal breath sounds.    Abdominal:      General: Bowel sounds are normal.      Palpations: Abdomen is soft. Musculoskeletal:         General: Normal range of motion. Skin:     General: Skin is warm and dry. Neurological:      Mental Status: He is alert and oriented to person, place, and time. Psychiatric:         Behavior: Behavior normal.        Labs :    Lab Results   Component Value Date    WBC 3.0 (L) 12/16/2022    HGB 11.5 (L) 12/16/2022    HCT 32.7 (L) 12/16/2022     12/16/2022    CHOL 175 12/07/2021    TRIG 68 12/07/2021    HDL 54 12/16/2022    ALT 13 12/16/2022    AST 24 12/16/2022     12/16/2022    K 4.9 12/16/2022     12/16/2022    CREATININE 1.1 12/16/2022    BUN 21 12/16/2022    CO2 24 12/16/2022    TSH 2.120 12/16/2022    PSA 10.20 (H) 12/07/2021    INR 1.2 02/21/2022    GLUF 100 (H) 12/16/2022    LABA1C 4.9 12/07/2021     Lab Results   Component Value Date/Time    COLORU Yellow 12/21/2022 02:36 PM    NITRU Negative 12/21/2022 02:36 PM    GLUCOSEU Negative 12/21/2022 02:36 PM    KETUA Negative 12/21/2022 02:36 PM    UROBILINOGEN 0.2 12/21/2022 02:36 PM    BILIRUBINUR Negative 12/21/2022 02:36 PM    BILIRUBINUR NEG 02/16/2021 03:40 PM     Lab Results   Component Value Date/Time    PSA 10.20 12/07/2021 12:00 PM             ASSESSMENT     Patient Active Problem List    Diagnosis Date Noted    Gastroesophageal reflux disease 08/24/2021     Assessment & Plan Note:      Controlled    Anti reflux measures    Protonix 40 mg qd        Acute sinusitis 08/24/2021    Prostate cancer metastatic to bone [address] [address] 08/04/2021     Assessment & Plan Note:      Monitored by specialist- no acute findings meriting change in the plan     Will notify her about his increase in pain       Pernicious anemia 08/04/2021    Hyperlipidemia 08/04/2021     Assessment & Plan Note:      Borderline controlled, Dietary adv       Hemorrhoid thrombosis 11/05/2016        Diagnosis:   1. Right flank pain  -     ibuprofen (ADVIL;MOTRIN) 800 MG tablet;  Take 1 tablet by mouth every 8 hours as needed for Pain With meal, Disp-40 tablet, R-1Normal  2. Gastroesophageal reflux disease, unspecified whether esophagitis present  Assessment & Plan:   Controlled    Anti reflux measures    Protonix 40 mg qd    3. Prostate cancer metastatic to bone [facility])  Assessment & Plan:   Monitored by specialist- no acute findings meriting change in the plan     Will notify her about his increase in pain   4. Hyperlipidemia, unspecified hyperlipidemia type  Assessment & Plan:   Borderline controlled, Dietary adv          PLAN:       US renal result is reviewed with pt    Pt has an appt with Heme onc this Friday    Will sent US and XR report to his Heme Onc     Pt will contact his urology    Meantime - Ibu 800 tid prn with meal    Pt is stable on current medical treatment. Continue current treatment plan    Side effects/Adverse effects/Precautions are reviewed with patient. Low salt, Low Carb diet an low fat diet  Continue medications as advised and take them regularly  Regular exercises as advised    Discussed natural and expected course of this diagnosis and need to alert me if symptoms do not follow expected course, or if any worse. Smoking cessation if applicable, discussed with patient. There are no Patient Instructions on file for this visit. Return in about 2 months (around 4/12/2023).

## 2023-02-06 NOTE — ASSESSMENT & PLAN NOTE
Monitored by specialist- no acute findings meriting change in the plan     Will notify her about his increase in pain

## 2023-03-13 ENCOUNTER — HOSPITAL ENCOUNTER (INPATIENT)
Age: 78
LOS: 5 days | Discharge: HOME | DRG: 660 | End: 2023-03-19
Attending: EMERGENCY MEDICINE | Admitting: INTERNAL MEDICINE
Payer: MEDICARE

## 2023-03-13 DIAGNOSIS — R59.0 INGUINAL LYMPHADENOPATHY: ICD-10-CM

## 2023-03-13 DIAGNOSIS — N13.30 HYDRONEPHROSIS OF LEFT KIDNEY: ICD-10-CM

## 2023-03-13 DIAGNOSIS — N13.30 HYDRONEPHROSIS, UNSPECIFIED HYDRONEPHROSIS TYPE: ICD-10-CM

## 2023-03-13 DIAGNOSIS — R10.9 RIGHT FLANK PAIN: ICD-10-CM

## 2023-03-13 PROCEDURE — 83690 ASSAY OF LIPASE: CPT

## 2023-03-13 PROCEDURE — 80053 COMPREHEN METABOLIC PANEL: CPT

## 2023-03-13 PROCEDURE — 99285 EMERGENCY DEPT VISIT HI MDM: CPT

## 2023-03-13 PROCEDURE — 36415 COLL VENOUS BLD VENIPUNCTURE: CPT

## 2023-03-13 PROCEDURE — 85025 COMPLETE CBC W/AUTO DIFF WBC: CPT

## 2023-03-13 PROCEDURE — 83605 ASSAY OF LACTIC ACID: CPT

## 2023-03-13 PROCEDURE — 81001 URINALYSIS AUTO W/SCOPE: CPT

## 2023-03-13 RX ORDER — IBUPROFEN 800 MG/1
800 TABLET ORAL EVERY 8 HOURS PRN
Qty: 40 TABLET | Refills: 1 | Status: SHIPPED | OUTPATIENT
Start: 2023-03-13

## 2023-03-13 ASSESSMENT — PAIN DESCRIPTION - FREQUENCY: FREQUENCY: CONTINUOUS

## 2023-03-13 ASSESSMENT — PAIN DESCRIPTION - ONSET: ONSET: ON-GOING

## 2023-03-13 ASSESSMENT — PAIN DESCRIPTION - DESCRIPTORS: DESCRIPTORS: ACHING

## 2023-03-13 ASSESSMENT — PAIN - FUNCTIONAL ASSESSMENT: PAIN_FUNCTIONAL_ASSESSMENT: 0-10

## 2023-03-13 ASSESSMENT — PAIN DESCRIPTION - ORIENTATION: ORIENTATION: RIGHT;LEFT

## 2023-03-13 ASSESSMENT — PAIN DESCRIPTION - PAIN TYPE: TYPE: ACUTE PAIN

## 2023-03-13 ASSESSMENT — PAIN SCALES - GENERAL: PAINLEVEL_OUTOF10: 9

## 2023-03-13 NOTE — Clinical Note
Discharge Plan[de-identified] Other/[facility])   Telemetry/Cardiac Monitoring Required?: No

## 2023-03-14 ENCOUNTER — APPOINTMENT (OUTPATIENT)
Dept: ULTRASOUND IMAGING | Age: 78
DRG: 660 | End: 2023-03-14
Payer: MEDICARE

## 2023-03-14 ENCOUNTER — APPOINTMENT (OUTPATIENT)
Dept: CT IMAGING | Age: 78
DRG: 660 | End: 2023-03-14
Payer: MEDICARE

## 2023-03-14 PROBLEM — N40.0 BPH (BENIGN PROSTATIC HYPERPLASIA): Status: ACTIVE | Noted: 2023-03-14

## 2023-03-14 PROBLEM — N13.1 HYDRONEPHROSIS DUE TO OBSTRUCTION OF URETER: Status: ACTIVE | Noted: 2023-03-14

## 2023-03-14 PROBLEM — C80.1 CANCER (HCC): Status: ACTIVE | Noted: 2021-06-01

## 2023-03-14 PROBLEM — N13.30 HYDRONEPHROSIS: Status: ACTIVE | Noted: 2023-03-14

## 2023-03-14 LAB
ALBUMIN SERPL-MCNC: 4.3 G/DL (ref 3.5–5.2)
ALP BLD-CCNC: 62 U/L (ref 40–129)
ALT SERPL-CCNC: 14 U/L (ref 0–40)
ANION GAP SERPL CALCULATED.3IONS-SCNC: 8 MMOL/L (ref 7–16)
AST SERPL-CCNC: 21 U/L (ref 0–39)
BACTERIA: ABNORMAL /HPF
BASOPHILS ABSOLUTE: 0.02 E9/L (ref 0–0.2)
BASOPHILS RELATIVE PERCENT: 0.4 % (ref 0–2)
BILIRUB SERPL-MCNC: 0.6 MG/DL (ref 0–1.2)
BILIRUBIN URINE: NEGATIVE
BLOOD, URINE: ABNORMAL
BUN BLDV-MCNC: 22 MG/DL (ref 6–23)
CALCIUM SERPL-MCNC: 9.4 MG/DL (ref 8.6–10.2)
CHLORIDE BLD-SCNC: 102 MMOL/L (ref 98–107)
CLARITY: CLEAR
CO2: 26 MMOL/L (ref 22–29)
COLOR: YELLOW
CREAT SERPL-MCNC: 1.2 MG/DL (ref 0.7–1.2)
EOSINOPHILS ABSOLUTE: 0.15 E9/L (ref 0.05–0.5)
EOSINOPHILS RELATIVE PERCENT: 2.8 % (ref 0–6)
EPITHELIAL CELLS, UA: ABNORMAL /HPF
GFR SERPL CREATININE-BSD FRML MDRD: >60 ML/MIN/1.73
GLUCOSE BLD-MCNC: 99 MG/DL (ref 74–99)
GLUCOSE URINE: NEGATIVE MG/DL
HCT VFR BLD CALC: 32.6 % (ref 37–54)
HEMOGLOBIN: 11.1 G/DL (ref 12.5–16.5)
IMMATURE GRANULOCYTES #: 0.02 E9/L
IMMATURE GRANULOCYTES %: 0.4 % (ref 0–5)
KETONES, URINE: NEGATIVE MG/DL
LACTIC ACID: 1.3 MMOL/L (ref 0.5–2.2)
LEUKOCYTE ESTERASE, URINE: NEGATIVE
LIPASE: 16 U/L (ref 13–60)
LYMPHOCYTES ABSOLUTE: 1.39 E9/L (ref 1.5–4)
LYMPHOCYTES RELATIVE PERCENT: 26 % (ref 20–42)
MCH RBC QN AUTO: 35 PG (ref 26–35)
MCHC RBC AUTO-ENTMCNC: 34 % (ref 32–34.5)
MCV RBC AUTO: 102.8 FL (ref 80–99.9)
MONOCYTES ABSOLUTE: 0.47 E9/L (ref 0.1–0.95)
MONOCYTES RELATIVE PERCENT: 8.8 % (ref 2–12)
NEUTROPHILS ABSOLUTE: 3.29 E9/L (ref 1.8–7.3)
NEUTROPHILS RELATIVE PERCENT: 61.6 % (ref 43–80)
NITRITE, URINE: NEGATIVE
PDW BLD-RTO: 14.2 FL (ref 11.5–15)
PH UA: 5.5 (ref 5–9)
PLATELET # BLD: 207 E9/L (ref 130–450)
PMV BLD AUTO: 9.5 FL (ref 7–12)
POTASSIUM SERPL-SCNC: 4.2 MMOL/L (ref 3.5–5)
PROTEIN UA: NEGATIVE MG/DL
RBC # BLD: 3.17 E12/L (ref 3.8–5.8)
RBC UA: ABNORMAL /HPF (ref 0–2)
SODIUM BLD-SCNC: 136 MMOL/L (ref 132–146)
SPECIFIC GRAVITY UA: 1.01 (ref 1–1.03)
TOTAL PROTEIN: 7.3 G/DL (ref 6.4–8.3)
UROBILINOGEN, URINE: 0.2 E.U./DL
WBC # BLD: 5.3 E9/L (ref 4.5–11.5)
WBC UA: ABNORMAL /HPF (ref 0–5)

## 2023-03-14 PROCEDURE — 76882 US LMTD JT/FCL EVL NVASC XTR: CPT

## 2023-03-14 PROCEDURE — 74177 CT ABD & PELVIS W/CONTRAST: CPT

## 2023-03-14 PROCEDURE — 2580000003 HC RX 258: Performed by: INTERNAL MEDICINE

## 2023-03-14 PROCEDURE — 6360000004 HC RX CONTRAST MEDICATION: Performed by: RADIOLOGY

## 2023-03-14 PROCEDURE — 88112 CYTOPATH CELL ENHANCE TECH: CPT

## 2023-03-14 PROCEDURE — 2580000003 HC RX 258

## 2023-03-14 PROCEDURE — 6370000000 HC RX 637 (ALT 250 FOR IP): Performed by: INTERNAL MEDICINE

## 2023-03-14 PROCEDURE — 1200000000 HC SEMI PRIVATE

## 2023-03-14 PROCEDURE — 6360000002 HC RX W HCPCS: Performed by: INTERNAL MEDICINE

## 2023-03-14 PROCEDURE — 6360000002 HC RX W HCPCS: Performed by: PHYSICIAN ASSISTANT

## 2023-03-14 PROCEDURE — 96361 HYDRATE IV INFUSION ADD-ON: CPT

## 2023-03-14 PROCEDURE — 96374 THER/PROPH/DIAG INJ IV PUSH: CPT

## 2023-03-14 PROCEDURE — APPSS30 APP SPLIT SHARED TIME 16-30 MINUTES: Performed by: NURSE PRACTITIONER

## 2023-03-14 RX ORDER — ONDANSETRON 2 MG/ML
4 INJECTION INTRAMUSCULAR; INTRAVENOUS EVERY 6 HOURS PRN
Status: DISCONTINUED | OUTPATIENT
Start: 2023-03-14 | End: 2023-03-19 | Stop reason: HOSPADM

## 2023-03-14 RX ORDER — 0.9 % SODIUM CHLORIDE 0.9 %
500 INTRAVENOUS SOLUTION INTRAVENOUS ONCE
Status: COMPLETED | OUTPATIENT
Start: 2023-03-14 | End: 2023-03-14

## 2023-03-14 RX ORDER — ACETAMINOPHEN 650 MG/1
650 SUPPOSITORY RECTAL EVERY 6 HOURS PRN
Status: DISCONTINUED | OUTPATIENT
Start: 2023-03-14 | End: 2023-03-19 | Stop reason: HOSPADM

## 2023-03-14 RX ORDER — SODIUM CHLORIDE 9 MG/ML
INJECTION, SOLUTION INTRAVENOUS
Status: COMPLETED
Start: 2023-03-14 | End: 2023-03-14

## 2023-03-14 RX ORDER — ACETAMINOPHEN 325 MG/1
650 TABLET ORAL EVERY 6 HOURS PRN
Status: DISCONTINUED | OUTPATIENT
Start: 2023-03-14 | End: 2023-03-19 | Stop reason: HOSPADM

## 2023-03-14 RX ORDER — MORPHINE SULFATE 10 MG/ML
6 INJECTION, SOLUTION INTRAMUSCULAR; INTRAVENOUS ONCE
Status: COMPLETED | OUTPATIENT
Start: 2023-03-14 | End: 2023-03-14

## 2023-03-14 RX ORDER — POLYETHYLENE GLYCOL 3350 17 G/17G
17 POWDER, FOR SOLUTION ORAL DAILY PRN
Status: DISCONTINUED | OUTPATIENT
Start: 2023-03-14 | End: 2023-03-19 | Stop reason: HOSPADM

## 2023-03-14 RX ORDER — SODIUM CHLORIDE 9 MG/ML
INJECTION, SOLUTION INTRAVENOUS CONTINUOUS
Status: DISCONTINUED | OUTPATIENT
Start: 2023-03-14 | End: 2023-03-18

## 2023-03-14 RX ORDER — HYDROMORPHONE HYDROCHLORIDE 1 MG/ML
0.5 INJECTION, SOLUTION INTRAMUSCULAR; INTRAVENOUS; SUBCUTANEOUS EVERY 4 HOURS PRN
Status: DISCONTINUED | OUTPATIENT
Start: 2023-03-14 | End: 2023-03-19 | Stop reason: HOSPADM

## 2023-03-14 RX ORDER — PANTOPRAZOLE SODIUM 40 MG/1
40 TABLET, DELAYED RELEASE ORAL DAILY
Status: DISCONTINUED | OUTPATIENT
Start: 2023-03-14 | End: 2023-03-19 | Stop reason: HOSPADM

## 2023-03-14 RX ORDER — PROMETHAZINE HYDROCHLORIDE 25 MG/1
12.5 TABLET ORAL EVERY 6 HOURS PRN
Status: DISCONTINUED | OUTPATIENT
Start: 2023-03-14 | End: 2023-03-19 | Stop reason: HOSPADM

## 2023-03-14 RX ORDER — CALCIUM CARBONATE 200(500)MG
1000 TABLET,CHEWABLE ORAL 3 TIMES DAILY PRN
Status: DISCONTINUED | OUTPATIENT
Start: 2023-03-14 | End: 2023-03-19 | Stop reason: HOSPADM

## 2023-03-14 RX ADMIN — PANTOPRAZOLE SODIUM 40 MG: 40 TABLET, DELAYED RELEASE ORAL at 09:01

## 2023-03-14 RX ADMIN — ACETAMINOPHEN 650 MG: 325 TABLET ORAL at 20:01

## 2023-03-14 RX ADMIN — ACETAMINOPHEN 650 MG: 325 TABLET ORAL at 15:01

## 2023-03-14 RX ADMIN — SODIUM CHLORIDE: 9 INJECTION, SOLUTION INTRAVENOUS at 09:06

## 2023-03-14 RX ADMIN — MORPHINE SULFATE 6 MG: 10 INJECTION, SOLUTION INTRAMUSCULAR; INTRAVENOUS at 00:39

## 2023-03-14 RX ADMIN — SODIUM CHLORIDE 500 ML: 9 INJECTION, SOLUTION INTRAVENOUS at 00:39

## 2023-03-14 RX ADMIN — HYDROMORPHONE HYDROCHLORIDE 0.5 MG: 1 INJECTION, SOLUTION INTRAMUSCULAR; INTRAVENOUS; SUBCUTANEOUS at 10:07

## 2023-03-14 RX ADMIN — IOPAMIDOL 80 ML: 755 INJECTION, SOLUTION INTRAVENOUS at 00:47

## 2023-03-14 RX ADMIN — SODIUM CHLORIDE: 9 INJECTION, SOLUTION INTRAVENOUS at 20:03

## 2023-03-14 RX ADMIN — Medication 500 ML: at 00:39

## 2023-03-14 RX ADMIN — HYDROMORPHONE HYDROCHLORIDE 0.5 MG: 1 INJECTION, SOLUTION INTRAMUSCULAR; INTRAVENOUS; SUBCUTANEOUS at 18:17

## 2023-03-14 ASSESSMENT — PAIN DESCRIPTION - LOCATION
LOCATION: HEAD
LOCATION: FLANK

## 2023-03-14 ASSESSMENT — PAIN SCALES - GENERAL
PAINLEVEL_OUTOF10: 9
PAINLEVEL_OUTOF10: 7
PAINLEVEL_OUTOF10: 5
PAINLEVEL_OUTOF10: 8

## 2023-03-14 ASSESSMENT — LIFESTYLE VARIABLES
HOW OFTEN DO YOU HAVE A DRINK CONTAINING ALCOHOL: NEVER
HOW MANY STANDARD DRINKS CONTAINING ALCOHOL DO YOU HAVE ON A TYPICAL DAY: PATIENT DOES NOT DRINK

## 2023-03-14 ASSESSMENT — PAIN DESCRIPTION - DESCRIPTORS
DESCRIPTORS: ACHING
DESCRIPTORS: ACHING
DESCRIPTORS: ACHING;DISCOMFORT

## 2023-03-14 ASSESSMENT — PAIN DESCRIPTION - FREQUENCY: FREQUENCY: CONTINUOUS

## 2023-03-14 ASSESSMENT — PAIN - FUNCTIONAL ASSESSMENT
PAIN_FUNCTIONAL_ASSESSMENT: ACTIVITIES ARE NOT PREVENTED
PAIN_FUNCTIONAL_ASSESSMENT: ACTIVITIES ARE NOT PREVENTED

## 2023-03-14 ASSESSMENT — PAIN DESCRIPTION - ORIENTATION
ORIENTATION: LEFT
ORIENTATION: RIGHT;LEFT
ORIENTATION: ANTERIOR

## 2023-03-14 ASSESSMENT — PAIN DESCRIPTION - PAIN TYPE
TYPE: ACUTE PAIN
TYPE: ACUTE PAIN

## 2023-03-14 NOTE — ED PROVIDER NOTES
Shared MAAME-ED Attending Visit. CC: No      Department of Emergency Medicine   ED  Encounter Note  Admit Date/RoomTime: 3/13/2023 11:31 PM  ED Room: 0423/0423-02    NAME: Chuy Lincoln  : 1945  MRN: [id number]     Chief Complaint:  Flank Pain and Nephrolithiasis (Pt states that he is having right sided flank pain with known kidney stone. Pt states that he was seen at his urologist and PCP and states that the placement of the stone should not cause pain. Pt is also having left sided pain as well. )    History of Present Illness        Chuy Lincoln is a 76 y.o. old male who presents to the emergency department by private vehicle, for gradual onset aching, dull pain left sided abdomen that is slightly worse in the LLQ without radiation which began 2 day(s) prior to arrival.  There has been similar episodes in the past due to kidney stones. Since onset the symptoms have been waxing and waning. The pain is associated with no additional symptoms. The pain is aggravated by nothing in particular and relieved by nothing. There has been NO chest pain, shortness of breath, fever, chills, sweating, nausea, vomiting, diarrhea, constipation, dark/black stools, blood in stool, blood in emesis, cloudy urine, urinary frequency, dysuria, hematuria, urinary urgency, urinary incontinence, penile discharge, or scrotal pain. ROS   Pertinent positives and negatives are stated within HPI, all other systems reviewed and are negative. Past Medical History:  has a past medical history of Acid reflux, Gómez esophagus, BPH (benign prostatic hyperplasia), Cancer ([facility] [address] [place] (hard of hearing), and Pernicious anemia. Surgical History:  has a past surgical history that includes Carpal tunnel release (Right); Appendectomy; knee surgery (Right); Tonsillectomy; Colonoscopy (2016); US BIOPSY LYMPH NODE (2021); and Catheter Insertion (N/A, 2022).     Social History:  reports that he quit smoking about 36 years

## 2023-03-14 NOTE — CARE COORDINATION
This patient is a . He will use his [facility] Medicare for this admission.  Electronically signed by Lashanda Greene on 3/14/2023 at 10:13 AM

## 2023-03-14 NOTE — CONSULTS
3/14/2023 10:47 AM  Abby Matthew  [id number]     Chief Complaint:    Left hydronephrosis with soft tissue masses adjacent and within the left ureter     History of Present Illness: The patient is a 76 y.o. male patient who presented to the hospital with complaints of left sided abdominal pain and flank pain. He had a CT abdomen pelvis performed that showed left hydronephrosis with some soft tissue masses adjacent and possible within the left ureter for which Urology was asked to evaluate. He has a history of prostate cancer that was diagnosed in April 2021 by Dr. Emily Green with [facility]. The patient elected to undergo brachytherapy and he was referred to [address] [place] [address] for this and completed this in July 2021. 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 He follows with the [facility]. He was started on Xtandi in early January 2022. He did receive chemotherapy. CT abdomen pelvis on 1/11/23 showed stable bone mets to T8 and right 2nd rib, without masses in the abdomen. He currently complains of left groin pain with some left lower extremity edema. Also has left sided abdominal pain, flank pain, and rib pain.      Past Medical History:   Diagnosis Date    Acid reflux     Barrette esophagus     Gómez esophagus     BPH (benign prostatic hyperplasia)     Cancer ([address] 06/2021    prostrate cancer     [place] (hard of hearing)     bilateral hearing aids    Pernicious anemia          Past Surgical History:   Procedure Laterality Date    APPENDECTOMY      CARPAL TUNNEL RELEASE Right     CATHETER INSERTION N/A 2/22/2022    MEDIPORT CATHETER INSERTION performed by Joan Mulligan MD at [address]  11/02/2016    KNEE SURGERY Right     TONSILLECTOMY      US LYMPH NODE BIOPSY  12/30/2021    US LYMPH NODE BIOPSY 12/30/2021 ERLINDAWKRISTYN ULTRASOUND       Medications

## 2023-03-14 NOTE — H&P
Patt Rosenbaum MD PATIENT NAME: Gisel Jean   CONTACT #: Hospitalist on call MRN: [id number]     Disclaimer: Portions of this note may have been generated using Dragon voice recognition software. Reasonable efforts were made to correct any dictation errors that resulted due to the programming of this software but some may still be present.

## 2023-03-15 ENCOUNTER — APPOINTMENT (OUTPATIENT)
Dept: NUCLEAR MEDICINE | Age: 78
DRG: 660 | End: 2023-03-15
Payer: MEDICARE

## 2023-03-15 PROBLEM — R19.09 MASS OF LEFT INGUINAL REGION: Status: ACTIVE | Noted: 2023-03-15

## 2023-03-15 PROBLEM — C7A.8 MALIGNANT NEUROENDOCRINE NEOPLASM (HCC): Status: ACTIVE | Noted: 2023-03-15

## 2023-03-15 LAB
ANION GAP SERPL CALCULATED.3IONS-SCNC: 8 MMOL/L (ref 7–16)
BASOPHILS ABSOLUTE: 0.02 E9/L (ref 0–0.2)
BASOPHILS RELATIVE PERCENT: 0.3 % (ref 0–2)
BUN BLDV-MCNC: 18 MG/DL (ref 6–23)
CALCIUM SERPL-MCNC: 8.8 MG/DL (ref 8.6–10.2)
CHLORIDE BLD-SCNC: 108 MMOL/L (ref 98–107)
CO2: 23 MMOL/L (ref 22–29)
CREAT SERPL-MCNC: 1.3 MG/DL (ref 0.7–1.2)
EOSINOPHILS ABSOLUTE: 0.06 E9/L (ref 0.05–0.5)
EOSINOPHILS RELATIVE PERCENT: 1 % (ref 0–6)
GFR SERPL CREATININE-BSD FRML MDRD: 56 ML/MIN/1.73
GLUCOSE BLD-MCNC: 109 MG/DL (ref 74–99)
HCT VFR BLD CALC: 30 % (ref 37–54)
HEMOGLOBIN: 10.1 G/DL (ref 12.5–16.5)
IMMATURE GRANULOCYTES #: 0.02 E9/L
IMMATURE GRANULOCYTES %: 0.3 % (ref 0–5)
LYMPHOCYTES ABSOLUTE: 0.68 E9/L (ref 1.5–4)
LYMPHOCYTES RELATIVE PERCENT: 11.7 % (ref 20–42)
MCH RBC QN AUTO: 34.9 PG (ref 26–35)
MCHC RBC AUTO-ENTMCNC: 33.7 % (ref 32–34.5)
MCV RBC AUTO: 103.8 FL (ref 80–99.9)
MONOCYTES ABSOLUTE: 0.59 E9/L (ref 0.1–0.95)
MONOCYTES RELATIVE PERCENT: 10.1 % (ref 2–12)
NEUTROPHILS ABSOLUTE: 4.46 E9/L (ref 1.8–7.3)
NEUTROPHILS RELATIVE PERCENT: 76.6 % (ref 43–80)
PDW BLD-RTO: 14.1 FL (ref 11.5–15)
PLATELET # BLD: 173 E9/L (ref 130–450)
PMV BLD AUTO: 9.2 FL (ref 7–12)
POTASSIUM SERPL-SCNC: 4 MMOL/L (ref 3.5–5)
RBC # BLD: 2.89 E12/L (ref 3.8–5.8)
SODIUM BLD-SCNC: 139 MMOL/L (ref 132–146)
WBC # BLD: 5.8 E9/L (ref 4.5–11.5)

## 2023-03-15 PROCEDURE — 1200000000 HC SEMI PRIVATE

## 2023-03-15 PROCEDURE — 36415 COLL VENOUS BLD VENIPUNCTURE: CPT

## 2023-03-15 PROCEDURE — 99232 SBSQ HOSP IP/OBS MODERATE 35: CPT | Performed by: STUDENT IN AN ORGANIZED HEALTH CARE EDUCATION/TRAINING PROGRAM

## 2023-03-15 PROCEDURE — 87088 URINE BACTERIA CULTURE: CPT

## 2023-03-15 PROCEDURE — A9562 TC99M MERTIATIDE: HCPCS | Performed by: RADIOLOGY

## 2023-03-15 PROCEDURE — 3430000000 HC RX DIAGNOSTIC RADIOPHARMACEUTICAL: Performed by: RADIOLOGY

## 2023-03-15 PROCEDURE — APPSS30 APP SPLIT SHARED TIME 16-30 MINUTES: Performed by: NURSE PRACTITIONER

## 2023-03-15 PROCEDURE — 2580000003 HC RX 258: Performed by: INTERNAL MEDICINE

## 2023-03-15 PROCEDURE — 6370000000 HC RX 637 (ALT 250 FOR IP): Performed by: INTERNAL MEDICINE

## 2023-03-15 PROCEDURE — 85025 COMPLETE CBC W/AUTO DIFF WBC: CPT

## 2023-03-15 PROCEDURE — 80048 BASIC METABOLIC PNL TOTAL CA: CPT

## 2023-03-15 PROCEDURE — 6360000002 HC RX W HCPCS: Performed by: RADIOLOGY

## 2023-03-15 PROCEDURE — 78708 K FLOW/FUNCT IMAGE W/DRUG: CPT

## 2023-03-15 RX ADMIN — FUROSEMIDE 40 MG: 10 INJECTION, SOLUTION INTRAMUSCULAR; INTRAVENOUS at 07:17

## 2023-03-15 RX ADMIN — SODIUM CHLORIDE: 9 INJECTION, SOLUTION INTRAVENOUS at 23:15

## 2023-03-15 RX ADMIN — SODIUM CHLORIDE: 9 INJECTION, SOLUTION INTRAVENOUS at 09:49

## 2023-03-15 RX ADMIN — PANTOPRAZOLE SODIUM 40 MG: 40 TABLET, DELAYED RELEASE ORAL at 20:41

## 2023-03-15 RX ADMIN — Medication 9 MILLICURIE: at 07:02

## 2023-03-15 ASSESSMENT — PAIN SCALES - GENERAL
PAINLEVEL_OUTOF10: 0
PAINLEVEL_OUTOF10: 4

## 2023-03-15 ASSESSMENT — PAIN DESCRIPTION - ORIENTATION: ORIENTATION: LEFT;RIGHT

## 2023-03-15 ASSESSMENT — PAIN DESCRIPTION - LOCATION: LOCATION: FLANK

## 2023-03-15 NOTE — ACP (ADVANCE CARE PLANNING)
Advance Care Planning   Healthcare Decision Maker:    Primary Decision Maker: Duncan Cedeño [facility] - [phone]

## 2023-03-15 NOTE — CARE COORDINATION
Case Management Assessment  Initial Evaluation    Date/Time of Evaluation: 3/15/2023 1:18 PM  Assessment Completed by: Alissa Francis RN    If patient is discharged prior to next notation, then this note serves as note for discharge by case management. Patient Name: Elena Smith                   YOB: 1945  Diagnosis: Hydronephrosis due to obstruction of ureter [N13.1]  Hydronephrosis [N13.30]                   Date / Time: 3/13/2023 11:31 PM    Patient Admission Status: Inpatient   Readmission Risk (Low < 19, Mod (19-27), High > 27): Readmission Risk Score: 10.8    Current PCP: Jonathon Dandy, MD  PCP verified by CM? Yes    Chart Reviewed: Yes      History Provided by: Patient  Patient Orientation: Alert and Oriented, Person, Place, Situation, Self    Patient Cognition: Alert    Hospitalization in the last 30 days (Readmission):  No        Advance Directives:      Code Status: Full Code   Patient's Primary Decision Maker is: Legal Next of Kin    Primary Decision MakerBergeoffrey Smith  Spouse - [phone]    Discharge Planning:    Patient lives with: Spouse/Significant Other Type of Home: House  Primary Care Giver: Self  Patient Support Systems include: Spouse/Significant Other   Current Financial resources: Medicare  Current community resources:    Current services prior to admission: None            Current DME:              Type of Home Care services:  None    ADLS  Prior functional level: Independent in ADLs/IADLs  Current functional level: Independent in ADLs/IADLs    PT AM-PAC:   /24  OT AM-PAC:   /24    Family can provide assistance at DC: Yes  Would you like Case Management to discuss the discharge plan with any other family members/significant others, and if so, who?  Yes (with wifeSue if needed)  Plans to Return to Present Housing: Yes  Potential Assistance needed at discharge: N/A            Potential DME:    Patient expects to discharge to: [address] [place] [address] for transportation at

## 2023-03-16 ENCOUNTER — APPOINTMENT (OUTPATIENT)
Dept: GENERAL RADIOLOGY | Age: 78
DRG: 660 | End: 2023-03-16
Payer: MEDICARE

## 2023-03-16 ENCOUNTER — ANESTHESIA EVENT (OUTPATIENT)
Dept: OPERATING ROOM | Age: 78
End: 2023-03-16
Payer: MEDICARE

## 2023-03-16 ENCOUNTER — ANESTHESIA (OUTPATIENT)
Dept: OPERATING ROOM | Age: 78
End: 2023-03-16
Payer: MEDICARE

## 2023-03-16 LAB
ANION GAP SERPL CALCULATED.3IONS-SCNC: 9 MMOL/L (ref 7–16)
BASOPHILS # BLD: 0.01 E9/L (ref 0–0.2)
BASOPHILS NFR BLD: 0.2 % (ref 0–2)
BUN SERPL-MCNC: 21 MG/DL (ref 6–23)
CALCIUM SERPL-MCNC: 8.6 MG/DL (ref 8.6–10.2)
CHLORIDE SERPL-SCNC: 107 MMOL/L (ref 98–107)
CO2 SERPL-SCNC: 25 MMOL/L (ref 22–29)
CREAT SERPL-MCNC: 1.7 MG/DL (ref 0.7–1.2)
EOSINOPHIL # BLD: 0.08 E9/L (ref 0.05–0.5)
EOSINOPHIL NFR BLD: 1.8 % (ref 0–6)
ERYTHROCYTE [DISTWIDTH] IN BLOOD BY AUTOMATED COUNT: 13.9 FL (ref 11.5–15)
ERYTHROCYTE [DISTWIDTH] IN BLOOD BY AUTOMATED COUNT: 14.1 FL (ref 11.5–15)
GLUCOSE SERPL-MCNC: 94 MG/DL (ref 74–99)
HCT VFR BLD AUTO: 29.1 % (ref 37–54)
HCT VFR BLD AUTO: 29.8 % (ref 37–54)
HGB BLD-MCNC: 10.1 G/DL (ref 12.5–16.5)
HGB BLD-MCNC: 9.9 G/DL (ref 12.5–16.5)
IMM GRANULOCYTES # BLD: 0.01 E9/L
IMM GRANULOCYTES NFR BLD: 0.2 % (ref 0–5)
LYMPHOCYTES # BLD: 0.89 E9/L (ref 1.5–4)
LYMPHOCYTES NFR BLD: 20.1 % (ref 20–42)
MCH RBC QN AUTO: 34.7 PG (ref 26–35)
MCH RBC QN AUTO: 35.8 PG (ref 26–35)
MCHC RBC AUTO-ENTMCNC: 33.2 % (ref 32–34.5)
MCHC RBC AUTO-ENTMCNC: 34.7 % (ref 32–34.5)
MCV RBC AUTO: 103.2 FL (ref 80–99.9)
MCV RBC AUTO: 104.6 FL (ref 80–99.9)
MONOCYTES # BLD: 0.53 E9/L (ref 0.1–0.95)
MONOCYTES NFR BLD: 12 % (ref 2–12)
NEUTROPHILS # BLD: 2.91 E9/L (ref 1.8–7.3)
NEUTS SEG NFR BLD: 65.7 % (ref 43–80)
PLATELET # BLD AUTO: 176 E9/L (ref 130–450)
PLATELET # BLD AUTO: 178 E9/L (ref 130–450)
PMV BLD AUTO: 9.4 FL (ref 7–12)
PMV BLD AUTO: 9.5 FL (ref 7–12)
POTASSIUM SERPL-SCNC: 4 MMOL/L (ref 3.5–5)
RBC # BLD AUTO: 2.82 E12/L (ref 3.8–5.8)
RBC # BLD AUTO: 2.85 E12/L (ref 3.8–5.8)
SODIUM SERPL-SCNC: 141 MMOL/L (ref 132–146)
WBC # BLD: 4.4 E9/L (ref 4.5–11.5)
WBC # BLD: 4.9 E9/L (ref 4.5–11.5)

## 2023-03-16 PROCEDURE — 85027 COMPLETE CBC AUTOMATED: CPT

## 2023-03-16 PROCEDURE — 87088 URINE BACTERIA CULTURE: CPT

## 2023-03-16 PROCEDURE — 3600000013 HC SURGERY LEVEL 3 ADDTL 15MIN: Performed by: UROLOGY

## 2023-03-16 PROCEDURE — 6370000000 HC RX 637 (ALT 250 FOR IP): Performed by: INTERNAL MEDICINE

## 2023-03-16 PROCEDURE — BT1F1ZZ FLUOROSCOPY OF LEFT KIDNEY, URETER AND BLADDER USING LOW OSMOLAR CONTRAST: ICD-10-PCS | Performed by: UROLOGY

## 2023-03-16 PROCEDURE — C1758 CATHETER, URETERAL: HCPCS | Performed by: UROLOGY

## 2023-03-16 PROCEDURE — 74400 UROGRAPHY IV +-KUB TOMOG: CPT

## 2023-03-16 PROCEDURE — C1769 GUIDE WIRE: HCPCS | Performed by: UROLOGY

## 2023-03-16 PROCEDURE — 85025 COMPLETE CBC W/AUTO DIFF WBC: CPT

## 2023-03-16 PROCEDURE — 3600000003 HC SURGERY LEVEL 3 BASE: Performed by: UROLOGY

## 2023-03-16 PROCEDURE — 1200000000 HC SEMI PRIVATE

## 2023-03-16 PROCEDURE — 80048 BASIC METABOLIC PNL TOTAL CA: CPT

## 2023-03-16 PROCEDURE — 2580000003 HC RX 258: Performed by: UROLOGY

## 2023-03-16 PROCEDURE — 7100000001 HC PACU RECOVERY - ADDTL 15 MIN: Performed by: UROLOGY

## 2023-03-16 PROCEDURE — 36415 COLL VENOUS BLD VENIPUNCTURE: CPT

## 2023-03-16 PROCEDURE — APPSS30 APP SPLIT SHARED TIME 16-30 MINUTES: Performed by: NURSE PRACTITIONER

## 2023-03-16 PROCEDURE — 2709999900 HC NON-CHARGEABLE SUPPLY: Performed by: UROLOGY

## 2023-03-16 PROCEDURE — 0T778DZ DILATION OF LEFT URETER WITH INTRALUMINAL DEVICE, VIA NATURAL OR ARTIFICIAL OPENING ENDOSCOPIC: ICD-10-PCS | Performed by: UROLOGY

## 2023-03-16 PROCEDURE — 99232 SBSQ HOSP IP/OBS MODERATE 35: CPT | Performed by: FAMILY MEDICINE

## 2023-03-16 PROCEDURE — 7100000000 HC PACU RECOVERY - FIRST 15 MIN: Performed by: UROLOGY

## 2023-03-16 PROCEDURE — 6360000002 HC RX W HCPCS: Performed by: NURSE ANESTHETIST, CERTIFIED REGISTERED

## 2023-03-16 PROCEDURE — 6360000002 HC RX W HCPCS: Performed by: NURSE PRACTITIONER

## 2023-03-16 PROCEDURE — 3700000000 HC ANESTHESIA ATTENDED CARE: Performed by: UROLOGY

## 2023-03-16 PROCEDURE — 3700000001 HC ADD 15 MINUTES (ANESTHESIA): Performed by: UROLOGY

## 2023-03-16 PROCEDURE — C2617 STENT, NON-COR, TEM W/O DEL: HCPCS | Performed by: UROLOGY

## 2023-03-16 PROCEDURE — 2580000003 HC RX 258: Performed by: NURSE PRACTITIONER

## 2023-03-16 DEVICE — URETERAL STENT
Type: IMPLANTABLE DEVICE | Site: URETER | Status: FUNCTIONAL
Brand: POLARIS™ ULTRA

## 2023-03-16 RX ORDER — FENTANYL CITRATE 50 UG/ML
INJECTION, SOLUTION INTRAMUSCULAR; INTRAVENOUS PRN
Status: DISCONTINUED | OUTPATIENT
Start: 2023-03-16 | End: 2023-03-16 | Stop reason: SDUPTHER

## 2023-03-16 RX ORDER — SODIUM CHLORIDE 0.9 % (FLUSH) 0.9 %
5-40 SYRINGE (ML) INJECTION PRN
Status: DISCONTINUED | OUTPATIENT
Start: 2023-03-16 | End: 2023-03-16 | Stop reason: HOSPADM

## 2023-03-16 RX ORDER — ONDANSETRON 2 MG/ML
4 INJECTION INTRAMUSCULAR; INTRAVENOUS
Status: DISCONTINUED | OUTPATIENT
Start: 2023-03-16 | End: 2023-03-16 | Stop reason: HOSPADM

## 2023-03-16 RX ORDER — PROPOFOL 10 MG/ML
INJECTION, EMULSION INTRAVENOUS PRN
Status: DISCONTINUED | OUTPATIENT
Start: 2023-03-16 | End: 2023-03-16 | Stop reason: SDUPTHER

## 2023-03-16 RX ORDER — MEPERIDINE HYDROCHLORIDE 25 MG/ML
12.5 INJECTION INTRAMUSCULAR; INTRAVENOUS; SUBCUTANEOUS EVERY 5 MIN PRN
Status: DISCONTINUED | OUTPATIENT
Start: 2023-03-16 | End: 2023-03-16 | Stop reason: HOSPADM

## 2023-03-16 RX ORDER — FENTANYL CITRATE 0.05 MG/ML
50 INJECTION, SOLUTION INTRAMUSCULAR; INTRAVENOUS EVERY 5 MIN PRN
Status: DISCONTINUED | OUTPATIENT
Start: 2023-03-16 | End: 2023-03-16 | Stop reason: HOSPADM

## 2023-03-16 RX ORDER — SODIUM CHLORIDE 9 MG/ML
INJECTION, SOLUTION INTRAVENOUS PRN
Status: DISCONTINUED | OUTPATIENT
Start: 2023-03-16 | End: 2023-03-16 | Stop reason: HOSPADM

## 2023-03-16 RX ORDER — FENTANYL CITRATE 0.05 MG/ML
25 INJECTION, SOLUTION INTRAMUSCULAR; INTRAVENOUS EVERY 5 MIN PRN
Status: DISCONTINUED | OUTPATIENT
Start: 2023-03-16 | End: 2023-03-16 | Stop reason: HOSPADM

## 2023-03-16 RX ORDER — SODIUM CHLORIDE, SODIUM LACTATE, POTASSIUM CHLORIDE, CALCIUM CHLORIDE 600; 310; 30; 20 MG/100ML; MG/100ML; MG/100ML; MG/100ML
INJECTION, SOLUTION INTRAVENOUS CONTINUOUS
Status: DISCONTINUED | OUTPATIENT
Start: 2023-03-16 | End: 2023-03-17

## 2023-03-16 RX ORDER — SODIUM CHLORIDE 0.9 % (FLUSH) 0.9 %
5-40 SYRINGE (ML) INJECTION EVERY 12 HOURS SCHEDULED
Status: DISCONTINUED | OUTPATIENT
Start: 2023-03-16 | End: 2023-03-16 | Stop reason: HOSPADM

## 2023-03-16 RX ADMIN — PANTOPRAZOLE SODIUM 40 MG: 40 TABLET, DELAYED RELEASE ORAL at 20:17

## 2023-03-16 RX ADMIN — FENTANYL CITRATE 50 MCG: 50 INJECTION, SOLUTION INTRAMUSCULAR; INTRAVENOUS at 10:57

## 2023-03-16 RX ADMIN — PROPOFOL 150 MCG/KG/MIN: 10 INJECTION, EMULSION INTRAVENOUS at 10:57

## 2023-03-16 RX ADMIN — PROPOFOL 100 MG: 10 INJECTION, EMULSION INTRAVENOUS at 10:56

## 2023-03-16 RX ADMIN — SODIUM CHLORIDE, POTASSIUM CHLORIDE, SODIUM LACTATE AND CALCIUM CHLORIDE: 600; 310; 30; 20 INJECTION, SOLUTION INTRAVENOUS at 13:10

## 2023-03-16 RX ADMIN — FENTANYL CITRATE 25 MCG: 50 INJECTION, SOLUTION INTRAMUSCULAR; INTRAVENOUS at 11:07

## 2023-03-16 RX ADMIN — FENTANYL CITRATE 25 MCG: 50 INJECTION, SOLUTION INTRAMUSCULAR; INTRAVENOUS at 11:09

## 2023-03-16 RX ADMIN — SODIUM CHLORIDE, POTASSIUM CHLORIDE, SODIUM LACTATE AND CALCIUM CHLORIDE: 600; 310; 30; 20 INJECTION, SOLUTION INTRAVENOUS at 20:18

## 2023-03-16 RX ADMIN — CEFAZOLIN 2000 MG: 2 INJECTION, POWDER, FOR SOLUTION INTRAMUSCULAR; INTRAVENOUS at 10:51

## 2023-03-16 ASSESSMENT — PAIN SCALES - GENERAL
PAINLEVEL_OUTOF10: 0
PAINLEVEL_OUTOF10: 0

## 2023-03-16 NOTE — CARE COORDINATION
3/16/2023 1109 CM note: Pt is independent and resides with his wife. He follows with Dr Nguyen Uribe and is receiving po chemo. Per IDR,pt for cysto today and lymph node biopsy tomorrow.  He plans to return home at d/c and drove self to hospital. Vivi SHANKS

## 2023-03-16 NOTE — ANESTHESIA POSTPROCEDURE EVALUATION
Department of Anesthesiology  Postprocedure Note    Patient: Jozef Muñoz  MRN: [id number]  YOB: 1945  Date of evaluation: 3/16/2023      Procedure Summary     Date: 03/16/23 Room / Location: [facility]  [facility]    Anesthesia Start: 1049 Anesthesia Stop: 1128    Procedure: CYSTOSCOPY RETROGRADE PYELOGRAM LEFT STENT INSERTION **CALL WITH TIME** (Left: Ureter) Diagnosis:       Hydronephrosis, unspecified hydronephrosis type      (Hydronephrosis, unspecified hydronephrosis type [N13.30])    Surgeons: Patric Wyatt MD Responsible Provider: Isrrael Mc MD    Anesthesia Type: MAC ASA Status: 3          Anesthesia Type: MAC    Jacobo Phase I:      Jacobo Phase II:        Anesthesia Post Evaluation    Patient location during evaluation: bedside  Patient participation: complete - patient participated  Level of consciousness: awake and alert  Pain score: 3  Airway patency: patent  Nausea & Vomiting: no nausea  Complications: no  Cardiovascular status: blood pressure returned to baseline  Respiratory status: acceptable  Hydration status: euvolemic

## 2023-03-16 NOTE — OP NOTE
[address]                                OPERATIVE REPORT    PATIENT NAME: Andra Ken                   :        1945  MED REC NO:   [id number]                            ROOM:  ACCOUNT NO:   [de-identified]                           ADMIT DATE: 2023  PROVIDER:     Lamonte Rai MD    DATE OF PROCEDURE:  2023    PREOPERATIVE DIAGNOSIS:  Left ureteral obstruction. POSTOPERATIVE DIAGNOSIS:  Left ureteral obstruction. OPERATION PERFORMED:  Cystopanendoscopy, retrograde pyelogram, left  stent placement. SURGEON:  Lamonte Rai MD    ESTIMATED BLOOD LOSS:  Less than 50. ANESTHESIA:  LMAC    OPERATION REPORT:  With the patient in the lithotomy position under  satisfactory sedation, the genitalia were prepped and draped in a  sterile fashion. A 21-Cypriot panendoscope passed with some difficulty  through the urethra. The urethra was quite snug. The prostatic fossa  showed evidence of trilobar obstruction. Upon entering the bladder,  trabeculation was present. The trigone was symmetrical.  The ureteral  orifices were somewhat hidden because of the irregularity of the bladder  outlet; however, I was able to locate both the orifices. A retrograde on  the left side revealed what appeared to be possible filling defects in  the mid to distal ureter. The upper collecting system did not appear to  be obstructed. A 26 cm 6-Cypriot double-J stent was placed on the left  side. On the right side, the collecting system did not appear to be  obstructed. There was no evidence of filling defects. The bladder was  drained with a 20-Cypriot Coude catheter. The patient was sent to the  recovery room in satisfactory condition. The plan is to see if his left  flank pain is relieved and his azotemia improves. The patient is  scheduled to have a biopsy of left inguinal node.         Shonna Moritz

## 2023-03-16 NOTE — BRIEF OP NOTE
Brief Postoperative Note      Patient: Mandy Tellez  YOB: 1945  MRN: [id number]    Date of Procedure: 3/16/2023    Pre-Op Diagnosis: Hydronephrosis, unspecified hydronephrosis type     Post-Op Diagnosis: Same       Op cysto retro left stent    Surgeon(s):  Trevon Owusu MD    Assistant:      Anesthesia: Monitor Anesthesia Care    Estimated Blood Loss (mL): Minimal    Complications: None    Specimens:   ID Type Source Tests Collected by Time Destination   1 :  Urine Urine, Cystoscopic CULTURE, URINE Trevon Owusu MD 3/16/2023 1102        Implants:  Implant Name Type Inv.  Item Serial No.  Lot No. LRB No. Used Action   STENT URET TAPR 6 FRX24 CM 6 FR SFT FIRM STRL POL[employer]IS ULTRA - [id number]  STENT URET TAPR 6 FRX24 CM 6 FR SFT FIRM Anitha Amas [employer]- [id number] Left 1 Implanted         Drains:   Urinary Catheter 03/16/23 Coude (Active)       Findings:     Electronically signed by Trevon Owusu MD on 3/16/2023 at 11:19 AM

## 2023-03-16 NOTE — ANESTHESIA PRE PROCEDURE
Department of Anesthesiology  Preprocedure Note       Name:  Ronak Post   Age:  76 y.o.  :  1945                                          MRN:  [id number]         Date:  3/16/2023      Surgeon: Grace Guaman):  Asmita Jimenez MD    Procedure: Procedure(s):  CYSTOSCOPY RETROGRADE PYELOGRAM LEFT STENT INSERTION **CALL WITH TIME**    Medications prior to admission:   Prior to Admission medications    Medication Sig Start Date End Date Taking?  Authorizing Provider   ibuprofen (ADVIL;MOTRIN) 800 MG tablet Take 1 tablet by mouth every 8 hours as needed for Pain With meal  Patient not taking: Reported on 3/14/2023 3/13/23   Maria A Menendez MD   ondansetron (ZOFRAN) 8 MG tablet ondansetron HCl 8 mg tablet   TAKE ONE TABLET BY MOUTH EVERY 8 HOURS AS NEEDED FOR NAUSEA AND VOMITING  Patient not taking: No sig reported    Historical Provider, MD   fluticasone (FLONASE) 50 MCG/ACT nasal spray 1 spray by Each Nostril route daily  Patient not taking: Reported on 3/14/2023 1/23/23   Maria A Menendez MD   pantoprazole (PROTONIX) 40 MG tablet Take 1 tablet by mouth daily 12/21/22 3/21/23  Zaire Sun MD   finasteride (PROSCAR) 5 MG tablet TAKE ONE TABLET BY MOUTH DAILY  Patient not taking: No sig reported 10/20/22   Historical Provider, MD   enzalutamide Alma Emmanuel) 40 MG capsule Take 160 mg by mouth daily    Historical Provider, MD   EPINEPHrine (EPIPEN 2-YULIYA) 0.3 MG/0.3ML SOAJ injection Inject 0.3 mLs into the muscle once for 1 dose Use as directed for allergic reaction 3/30/22 10/31/22  Maria A Menendez MD   tamsulosin (FLOMAX) 0.4 MG capsule TAKE TWO CAPSULES BY MOUTH DAILY AT BEDTIME  Patient not taking: No sig reported 21   Mayda Mattson MD   Cyanocobalamin (B-12) 2500 MCG TABS Take 2,500 mg by mouth daily  Patient not taking: Reported on 3/14/2023    Historical Provider, MD   Multiple Vitamins-Minerals (THERAPEUTIC MULTIVITAMIN-MINERALS) tablet Take 1 tablet by mouth daily    Historical Provider, MD   KRILL OIL PO Take 1 capsule by mouth daily    Historical Provider, MD   calcium carbonate (TUMS) 500 MG chewable tablet Take 2 tablets by mouth 3 times daily as needed for Heartburn    Historical Provider, MD       Current medications:    Current Facility-Administered Medications   Medication Dose Route Frequency Provider Last Rate Last Admin    Enzalutamide TABS 160 mg (Patient Supplied)  160 mg Oral Daily MANOHAR Lawrence CNP        ceFAZolin (ANCEF) 2,000 mg in sterile water 20 mL IV syringe  2,000 mg IntraVENous On Call to [address], APRN - CNP        HYDROmorphone HCl PF (DILAUDID) injection 0.5 mg  0.5 mg IntraVENous Q4H PRN Taylor Guzman MD   0.5 mg at 23    0.9 % sodium chloride infusion   IntraVENous Continuous Taylor Guzman MD 75 mL/hr at 03/15/23 2315 New Bag at 03/15/23 2315    promethazine (PHENERGAN) tablet 12.5 mg  12.5 mg Oral Q6H PRN Taylor Guzman MD        Or    ondansetron (ZOFRAN) injection 4 mg  4 mg IntraVENous Q6H PRN Taylor Guzman MD        polyethylene glycol (GLYCOLAX) packet 17 g  17 g Oral Daily PRN Taylor Guzman MD        acetaminophen (TYLENOL) tablet 650 mg  650 mg Oral Q6H PRN Taylor Guzman MD   650 mg at 23    Or    acetaminophen (TYLENOL) suppository 650 mg  650 mg Rectal Q6H PRN Taylor Guzman MD        pantoprazole (PROTONIX) tablet 40 mg  40 mg Oral Daily Taylor Guzman MD   40 mg at 03/15/23 2041    calcium carbonate (TUMS) chewable tablet 1,000 mg  1,000 mg Oral TID PRN Chilo [place], DO           Allergies:  No Known Allergies    Problem List:    Patient Active Problem List   Diagnosis Code    Hemorrhoid thrombosis K64.5    Prostate cancer metastatic to bone ([facility] [address] C61, C79.51    Pernicious anemia D51.0    Hyperlipidemia E78.5    Gastroesophageal reflux disease K21.9    Acute sinusitis J01.90    Hydronephrosis due to obstruction of ureter N13.1    Hydronephrosis N13.30    Cancer ([address] C80.1  BPH (benign prostatic hyperplasia) N40.0    Mass of left inguinal region R19.09    Malignant neuroendocrine neoplasm (HCC) C7A.8       Past Medical History:        Diagnosis Date    Acid reflux     Barrette esophagus     Gómez esophagus     BPH (benign prostatic hyperplasia)     Cancer ([address] 2021    prostrate cancer     [place] (hard of hearing)     bilateral hearing aids    Pernicious anemia        Past Surgical History:        Procedure Laterality Date    APPENDECTOMY      CARPAL TUNNEL RELEASE Right     CATHETER INSERTION N/A 2022    MEDIPORT CATHETER INSERTION performed by Eunice Shukla MD at [address] [place] [address]  2016    KNEE SURGERY Right     TONSILLECTOMY      US LYMPH NODE BIOPSY  2021    US LYMPH NODE BIOPSY 2021 [address] ULTRASOUND       Social History:    Social History     Tobacco Use    Smoking status: Former     Packs/day: 1.00     Years: 5.00     Pack years: 5.00     Types: Cigarettes     Quit date: 1986     Years since quittin.2    Smokeless tobacco: Never   Substance Use Topics    Alcohol use: No                                Counseling given: Not Answered      Vital Signs (Current):   Vitals:    23 1945 03/15/23 1302 03/15/23 2045 23 0716   BP: (!) 143/93 (!) 133/90 (!) 113/56 129/80   Pulse: 93 82 87 76   Resp: 20 14  20   Temp: 98 °F (36.7 °C) 98.2 °F (36.8 °C) 98.8 °F (37.1 °C) 98.4 °F (36.9 °C)   TempSrc: Oral Oral Temporal Oral   SpO2: 96% 92% 95% 97%   Weight:       Height:                                                  BP Readings from Last 3 Encounters:   23 129/80   23 126/72   23 120/74       NPO Status:                                                                                 BMI:   Wt Readings from Last 3 Encounters:   23 215 lb (97.5 kg)   23 216 lb 8 oz (98.2 kg)   23 219 lb 6.4 oz (99.5 kg)     Body mass index is 30.85 kg/m².     CBC:   Lab Results   Component Value Date/Time    WBC 4.9 03/16/2023 05:55 AM    RBC 2.85 03/16/2023 05:55 AM    HGB 9.9 03/16/2023 05:55 AM    HCT 29.8 03/16/2023 05:55 AM    .6 03/16/2023 05:55 AM    RDW 13.9 03/16/2023 05:55 AM     03/16/2023 05:55 AM       CMP:   Lab Results   Component Value Date/Time     03/16/2023 05:55 AM    K 4.0 03/16/2023 05:55 AM    K 4.4 02/25/2020 08:34 AM     03/16/2023 05:55 AM    CO2 25 03/16/2023 05:55 AM    BUN 21 03/16/2023 05:55 AM    CREATININE 1.7 03/16/2023 05:55 AM    CREATININE 1.2 12/30/2019 12:00 AM    GFRAA >60 02/21/2022 01:04 PM    LABGLOM 40 03/16/2023 05:55 AM    GLUCOSE 94 03/16/2023 05:55 AM    GLUCOSE 102 02/24/2012 08:38 AM    PROT 7.3 03/13/2023 11:50 PM    CALCIUM 8.6 03/16/2023 05:55 AM    BILITOT 0.6 03/13/2023 11:50 PM    ALKPHOS 62 03/13/2023 11:50 PM    AST 21 03/13/2023 11:50 PM    ALT 14 03/13/2023 11:50 PM       POC Tests: No results for input(s): POCGLU, POCNA, POCK, POCCL, POCBUN, POCHEMO, POCHCT in the last 72 hours.     Coags:   Lab Results   Component Value Date/Time    PROTIME 14.2 02/21/2022 01:04 PM    INR 1.2 02/21/2022 01:04 PM       HCG (If Applicable): No results found for: PREGTESTUR, PREGSERUM, HCG, HCGQUANT     ABGs: No results found for: PHART, PO2ART, QVK4RTU, UJV5GMP, BEART, X4ETKYCW     Type & Screen (If Applicable):  No results found for: LABABO, LABRH    Drug/Infectious Status (If Applicable):  No results found for: HIV, HEPCAB    COVID-19 Screening (If Applicable):   Lab Results   Component Value Date/Time    COVID19 NEG 02/26/2021 12:00 AM           Anesthesia Evaluation  Patient summary reviewed  Airway: Mallampati: II  TM distance: >3 FB     Mouth opening: > = 3 FB   Dental: normal exam         Pulmonary:Negative Pulmonary ROS breath sounds clear to auscultation                             Cardiovascular:Negative CV ROS            Rhythm: regular  Rate: normal                    Neuro/Psych:   Negative Neuro/Psych ROS GI/Hepatic/Renal:   (+) GERD: well controlled, renal disease: kidney stones,           Endo/Other: Negative Endo/Other ROS   (+) blood dyscrasia: anemia:., malignancy/cancer. Abdominal:       Abdomen: soft. Vascular: Other Findings:           Anesthesia Plan      MAC     ASA 3       Induction: intravenous. Anesthetic plan and risks discussed with patient. Plan discussed with CRNA.                     Airam Hunt MD   3/16/2023

## 2023-03-16 NOTE — PLAN OF CARE
Problem: Safety - Adult  Goal: Free from fall injury  3/15/2023 1316 by Haley Orr RN  Outcome: Progressing     Problem: Pain  Goal: Verbalizes/displays adequate comfort level or baseline comfort level  3/16/2023 0259 by Ludy Jackson RN  Outcome: Progressing  3/15/2023 1316 by Haley Orr RN  Outcome: Progressing

## 2023-03-17 ENCOUNTER — APPOINTMENT (OUTPATIENT)
Dept: ULTRASOUND IMAGING | Age: 78
DRG: 660 | End: 2023-03-17
Payer: MEDICARE

## 2023-03-17 LAB
ANION GAP SERPL CALCULATED.3IONS-SCNC: 8 MMOL/L (ref 7–16)
BUN SERPL-MCNC: 14 MG/DL (ref 6–23)
CALCIUM SERPL-MCNC: 8.3 MG/DL (ref 8.6–10.2)
CHLORIDE SERPL-SCNC: 107 MMOL/L (ref 98–107)
CO2 SERPL-SCNC: 22 MMOL/L (ref 22–29)
CREAT SERPL-MCNC: 1 MG/DL (ref 0.7–1.2)
ERYTHROCYTE [DISTWIDTH] IN BLOOD BY AUTOMATED COUNT: 13.9 FL (ref 11.5–15)
GLUCOSE SERPL-MCNC: 99 MG/DL (ref 74–99)
HCT VFR BLD AUTO: 29.7 % (ref 37–54)
HGB BLD-MCNC: 9.8 G/DL (ref 12.5–16.5)
MCH RBC QN AUTO: 34.5 PG (ref 26–35)
MCHC RBC AUTO-ENTMCNC: 33 % (ref 32–34.5)
MCV RBC AUTO: 104.6 FL (ref 80–99.9)
PLATELET # BLD AUTO: 183 E9/L (ref 130–450)
PMV BLD AUTO: 9.4 FL (ref 7–12)
POTASSIUM SERPL-SCNC: 3.7 MMOL/L (ref 3.5–5)
RBC # BLD AUTO: 2.84 E12/L (ref 3.8–5.8)
SODIUM SERPL-SCNC: 137 MMOL/L (ref 132–146)
WBC # BLD: 6.2 E9/L (ref 4.5–11.5)

## 2023-03-17 PROCEDURE — 80048 BASIC METABOLIC PNL TOTAL CA: CPT

## 2023-03-17 PROCEDURE — 88342 IMHCHEM/IMCYTCHM 1ST ANTB: CPT

## 2023-03-17 PROCEDURE — 85027 COMPLETE CBC AUTOMATED: CPT

## 2023-03-17 PROCEDURE — 88305 TISSUE EXAM BY PATHOLOGIST: CPT

## 2023-03-17 PROCEDURE — 2580000003 HC RX 258: Performed by: UROLOGY

## 2023-03-17 PROCEDURE — 1200000000 HC SEMI PRIVATE

## 2023-03-17 PROCEDURE — 6370000000 HC RX 637 (ALT 250 FOR IP): Performed by: INTERNAL MEDICINE

## 2023-03-17 PROCEDURE — APPSS30 APP SPLIT SHARED TIME 16-30 MINUTES: Performed by: NURSE PRACTITIONER

## 2023-03-17 PROCEDURE — 2709999900 US GUIDED NEEDLE PLACEMENT

## 2023-03-17 PROCEDURE — 99232 SBSQ HOSP IP/OBS MODERATE 35: CPT | Performed by: FAMILY MEDICINE

## 2023-03-17 PROCEDURE — 07BJ3ZX EXCISION OF LEFT INGUINAL LYMPHATIC, PERCUTANEOUS APPROACH, DIAGNOSTIC: ICD-10-PCS | Performed by: RADIOLOGY

## 2023-03-17 PROCEDURE — 36415 COLL VENOUS BLD VENIPUNCTURE: CPT

## 2023-03-17 PROCEDURE — 38505 NEEDLE BIOPSY LYMPH NODES: CPT

## 2023-03-17 PROCEDURE — 2580000003 HC RX 258: Performed by: INTERNAL MEDICINE

## 2023-03-17 PROCEDURE — 88341 IMHCHEM/IMCYTCHM EA ADD ANTB: CPT

## 2023-03-17 RX ADMIN — PANTOPRAZOLE SODIUM 40 MG: 40 TABLET, DELAYED RELEASE ORAL at 20:06

## 2023-03-17 RX ADMIN — SODIUM CHLORIDE, POTASSIUM CHLORIDE, SODIUM LACTATE AND CALCIUM CHLORIDE: 600; 310; 30; 20 INJECTION, SOLUTION INTRAVENOUS at 04:21

## 2023-03-17 RX ADMIN — SODIUM CHLORIDE: 9 INJECTION, SOLUTION INTRAVENOUS at 20:11

## 2023-03-17 NOTE — CARE COORDINATION
3/17/2023 1056 CM note: Pt is independent and resides with his wife. He follows with Dr Roderick Jain and is receiving po chemo. Pt had cysto 3/16/23 and for lymph node biopsy today. He plans to return home at d/c and drove self to hospital. IF pt needs malhotra catheter at d/c-nursing will need to provide leg bag/teaching.  Vivi SHANKS

## 2023-03-18 ENCOUNTER — APPOINTMENT (OUTPATIENT)
Dept: ULTRASOUND IMAGING | Age: 78
DRG: 660 | End: 2023-03-18
Payer: MEDICARE

## 2023-03-18 LAB — BACTERIA UR CULT: NORMAL

## 2023-03-18 PROCEDURE — 99232 SBSQ HOSP IP/OBS MODERATE 35: CPT | Performed by: FAMILY MEDICINE

## 2023-03-18 PROCEDURE — 93971 EXTREMITY STUDY: CPT

## 2023-03-18 PROCEDURE — 6370000000 HC RX 637 (ALT 250 FOR IP): Performed by: FAMILY MEDICINE

## 2023-03-18 PROCEDURE — 1200000000 HC SEMI PRIVATE

## 2023-03-18 PROCEDURE — 6360000002 HC RX W HCPCS: Performed by: INTERNAL MEDICINE

## 2023-03-18 PROCEDURE — APPSS30 APP SPLIT SHARED TIME 16-30 MINUTES: Performed by: NURSE PRACTITIONER

## 2023-03-18 RX ADMIN — DICLOFENAC SODIUM 2 G: 10 GEL TOPICAL at 20:42

## 2023-03-18 RX ADMIN — DICLOFENAC SODIUM 2 G: 10 GEL TOPICAL at 12:01

## 2023-03-18 RX ADMIN — HYDROMORPHONE HYDROCHLORIDE 0.5 MG: 1 INJECTION, SOLUTION INTRAMUSCULAR; INTRAVENOUS; SUBCUTANEOUS at 12:02

## 2023-03-18 ASSESSMENT — PAIN SCALES - GENERAL
PAINLEVEL_OUTOF10: 10
PAINLEVEL_OUTOF10: 0

## 2023-03-18 ASSESSMENT — PAIN DESCRIPTION - DESCRIPTORS: DESCRIPTORS: ACHING;DISCOMFORT;TENDER

## 2023-03-18 ASSESSMENT — PAIN DESCRIPTION - ORIENTATION: ORIENTATION: LEFT

## 2023-03-18 ASSESSMENT — PAIN DESCRIPTION - LOCATION: LOCATION: LEG

## 2023-03-18 ASSESSMENT — PAIN - FUNCTIONAL ASSESSMENT: PAIN_FUNCTIONAL_ASSESSMENT: PREVENTS OR INTERFERES SOME ACTIVE ACTIVITIES AND ADLS

## 2023-03-18 NOTE — PROGRESS NOTES
3/17/2023 9:29 AM  Service: Urology  Group: [facility] ([facility])    Thang Corona  [id number]    Subjective:  afebrile  The patient was not in the room I did talk to the nursing staff about him    Review of Systems  Respiratory: negative  Cardiovascular: negative  Gastrointestinal: negative  Hematologic/lymphatic: negative  Musculoskeletal:negative  Neurological: negative  Endocrine: negative    Scheduled Meds:   Enzalutamide  160 mg Oral Daily    pantoprazole  40 mg Oral Daily       Objective:  Vitals:    03/17/23 0730   BP: 130/72   Pulse: 82   Resp: 20   Temp: 98.2 °F (36.8 °C)   SpO2: 95%         Allergies: Patient has no known allergies. No physical exam done as the patient was not in the    Labs:     Recent Labs     03/17/23  0638      K 3.7      CO2 22   BUN 14   CREATININE 1.0   GLUCOSE 99   CALCIUM 8.3*       Lab Results   Component Value Date/Time    HGB 9.8 03/17/2023 06:38 AM    HCT 29.7 03/17/2023 06:38 AM         Assessment:  Thang Guzmán Cyndi 76 y.o. male     Principal Problem:    Hydronephrosis due to obstruction of ureter  Active Problems:    Hydronephrosis of left kidney    Cancer (HCC)    BPH (benign prostatic hyperplasia)    Mass of left inguinal region    Malignant neuroendocrine neoplasm ([address]  Resolved Problems:    * No resolved hospital problems. *  Urine culture results pending        Plan: We will discharge once the appropriate antibiotic has been selected.   He will need a secondary procedure in 2 to 3 weeks once his urinary tract infection is resolved  We will remove his Ramirez catheter today for trial of voiding  Most likely he will be here till at least tomorrow when the culture results should be back we will select the appropriate antibiotic and the duration of therapy then        MD [facility]  Urology
[address] [place], [place] [address]   Progress Note    Admitting Date and Time: 3/13/2023 11:31 PM  Admit Dx: Hydronephrosis due to obstruction of ureter [N13.1]  Hydronephrosis [N13.30]    Subjective:    Pt awake and alert with no complaints of discomfort verbalized. Patient is NPO for a procedure today. ROS: denies fever, chills, cp, sob, n/v, HA unless stated above.      pantoprazole  40 mg Oral Daily    enzalutamide  40 mg Oral Daily     HYDROmorphone, 0.5 mg, Q4H PRN  promethazine, 12.5 mg, Q6H PRN   Or  ondansetron, 4 mg, Q6H PRN  polyethylene glycol, 17 g, Daily PRN  acetaminophen, 650 mg, Q6H PRN   Or  acetaminophen, 650 mg, Q6H PRN  calcium carbonate, 1,000 mg, TID PRN       Objective:    BP (!) 133/90   Pulse 82   Temp 98.2 °F (36.8 °C) (Oral)   Resp 14   Ht 5' 10\" (1.778 m)   Wt 215 lb (97.5 kg)   SpO2 92%   BMI 30.85 kg/m²   General Appearance: alert and oriented to person, place and time and in no acute distress  Skin: warm and dry  Head: normocephalic and atraumatic  Eyes: pupils equal, round, and reactive to light, extraocular eye movements intact, conjunctivae normal  Neck: neck supple and non tender without mass   Pulmonary/Chest: clear to auscultation bilaterally- no wheezes, rales or rhonchi, normal air movement, no respiratory distress  Cardiovascular: normal rate, normal S1 and S2 and no carotid bruits  Abdomen: soft, non-tender, + distended, normal bowel sounds, left inguinal lump noted tender with palpation   Extremities: no cyanosis, no clubbing and 1+ left leg and trace right leg edema  Neurologic: no cranial nerve deficit and speech normal      Recent Labs     03/13/23  2350 03/15/23  0626    139   K 4.2 4.0    108*   CO2 26 23   BUN 22 18   CREATININE 1.2 1.3*   GLUCOSE 99 109*   CALCIUM 9.4 8.8       Recent Labs     03/13/23 2350   ALKPHOS 62   PROT 7.3   LABALBU 4.3   BILITOT 0.6   AST 21   ALT 14       Recent Labs     03/13/23  2350 03/15/23  0626   WBC 5.3
Called answering service, spoke with Marcos Rocha, we need orders to d/c Ramirez and do they still want IV fluids - Dr. Harriette Rinne is on call this weekend.
May Leonardo, NP, pt c/o pain and swelling behind left knee and calf area.
Patient prefers to keep wallet and chemo med from home at bedside. Pharmacy relabeled home chemo med, Patient wants it by his bedside because of the expense of the oral chemo med.
Patient requested to keep home chemo med at bedside. Offered to lock it up in the med room. Patient also had wallet in room that he declined to have our police department lock up.
Patient stated he had blood wiped from his buttocks. No external hemorrhoids noted. Charge nurse Sam Rudd notified.
S: Patient awake, has pain left lower abdomen, some right lateral ribs, no bleeding, etc    O:Temp 97.8 P-83 R-18 Bp146/85  Gen- alert,talking, well nourished, no acute distressed  HEENT- mmm  Lungs- clear  Cardiac- RRR  Abd- soft, non distended +BS no HSM, enlarged 2 to 3 cm left inguinal node medial aspect  Ext- 1+ edema left lower leg    Labs:  Creatinine 1.2 wbc=5 Hb=11.1 zwv=542    March 2023 PSA <0.04    CT abd/pelvis  1. Moderate left kidney hydronephrosis and proximal hydroureter. A 1.9 cm   soft tissue mass is present adjacent to the mid left ureter at the L3-L4   level. 7mm and 8 mm soft tissue densities are present within the lumen of   the distal left ureter at the L5 and S1 levels. These findings are   concerning for possible uroepithelial neoplasm. 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 It is possible that this started in the prostate. and perhaps he had mixed compoenent of adenoa  and high grade neuroendocrine cancer. He had 6 cycles of carbo- etoposide completed May of 2022. His last CT scan Jan 2023 abd/pelvis was negative. He now is admitted with large left inguinal lymph node, left hydronephrosis from at least periureteral mass, most likely recurrent poorly diff high grade neuroendocrine tumor. Request FNA left inguinal node to confirm suspected high grade neuroendocrine recurrence  Urology on board evaluating hydronephrosis  Plan carbo-etoposide-tecentriq outpateint for poorly diff high grade neruoendocrine tumor   4.  Adenoa of prostate well controlled with PSA <0.04    Reji Koehler MD
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 He had 6 cycles of carbo- etoposide completed May of 2022. His last CT scan Jan 2023 abd/pelvis was negative. He now is admitted with large left inguinal lymph node, left hydronephrosis from at least periureteral mass, most likely recurrent poorly diff high grade neuroendocrine tumor. He is s/p left ureteral stent and creatinine improved. FNA left inguinal node performed yesterday. A/P  Primary team evaluating new left posterior knee pain  Plan carbo-etoposide-tecentriq outpateint for poorly diff high grade neruoendocrine tumor in1 week  3. Adenocarcinoma of prostate well controlled with PSA <0.04 on xtandi  4. Patient with speak with my office Monday about setting of treatment appointments and MD dickerson up  5.  Anemia due to acute ilness and renal disease should improve and stabalize    Mario Jose MD
Continuous, Aramis Helm MD, Last Rate: 75 mL/hr at 03/15/23 0949, New Bag at 03/15/23 0949    promethazine (PHENERGAN) tablet 12.5 mg, 12.5 mg, Oral, Q6H PRN **OR** ondansetron (ZOFRAN) injection 4 mg, 4 mg, IntraVENous, Q6H PRN, Aramis Helm MD    polyethylene glycol (GLYCOLAX) packet 17 g, 17 g, Oral, Daily PRN, Aramis Helm MD    acetaminophen (TYLENOL) tablet 650 mg, 650 mg, Oral, Q6H PRN, 650 mg at 03/14/23 2001 **OR** acetaminophen (TYLENOL) suppository 650 mg, 650 mg, Rectal, Q6H PRN, Aramis Helm MD    pantoprazole (PROTONIX) tablet 40 mg, 40 mg, Oral, Daily, Aramis Helm MD, 40 mg at 03/14/23 0901    calcium carbonate (TUMS) chewable tablet 1,000 mg, 1,000 mg, Oral, TID PRN, Leyda Kiran,     enzalutamide (XTANDI) capsule 40 mg (Patient Supplied), 40 mg, Oral, Daily, Leyda Kiran DO    NM RENAL WITH LASIX   Final Result   1. Diminished and delayed function of the left kidney with prominent   collecting system. There is good response to Lasix administration and the   functional changes could be due to partial and/or resolved obstruction. 2.  Normal function of the right kidney. 3.  Split function is 34% on the left and 66% on the right. US EXTREMITY LEFT NON VASC LIMITED   Final Result   Left inguinal enlarged, malignant-appearing lymph nodes, as described above. The larger, more irregular lymph node has heterogeneous internal vascularity   suggestive of partial necrosis. CT ABDOMEN PELVIS W IV CONTRAST Additional Contrast? None   Final Result   1. Moderate left kidney hydronephrosis and proximal hydroureter. A 1.9 cm   soft tissue mass is present adjacent to the mid left ureter at the L3-L4   level. 7mm and 8 mm soft tissue densities are present within the lumen of   the distal left ureter at the L5 and S1 levels. These findings are   concerning for possible uroepithelial neoplasm. Suggest urological surgery   consultation for possible ureteroscopy.
hours. Recent Labs     03/16/23  0555 03/16/23  1521 03/17/23  0638   WBC 4.9 4.4* 6.2   RBC 2.85* 2.82* 2.84*   HGB 9.9* 10.1* 9.8*   HCT 29.8* 29.1* 29.7*   .6* 103.2* 104.6*   MCH 34.7 35.8* 34.5   MCHC 33.2 34.7* 33.0   RDW 13.9 14.1 13.9    178 183   MPV 9.4 9.5 9.4       Radiology:   XR UROGRAM W OR WO KUB W OR WO TOMOGRAM   Final Result   Intraprocedural fluoroscopic spot images as above. See separate procedure   report for more information. NM RENAL WITH LASIX   Final Result   1. Diminished and delayed function of the left kidney with prominent   collecting system. There is good response to Lasix administration and the   functional changes could be due to partial and/or resolved obstruction. 2.  Normal function of the right kidney. 3.  Split function is 34% on the left and 66% on the right. US EXTREMITY LEFT NON VASC LIMITED   Final Result   Left inguinal enlarged, malignant-appearing lymph nodes, as described above. The larger, more irregular lymph node has heterogeneous internal vascularity   suggestive of partial necrosis. CT ABDOMEN PELVIS W IV CONTRAST Additional Contrast? None   Final Result   1. Moderate left kidney hydronephrosis and proximal hydroureter. A 1.9 cm   soft tissue mass is present adjacent to the mid left ureter at the L3-L4   level. 7mm and 8 mm soft tissue densities are present within the lumen of   the distal left ureter at the L5 and S1 levels. These findings are   concerning for possible uroepithelial neoplasm. Suggest urological surgery   consultation for possible ureteroscopy. RECOMMENDATIONS:   Careful clinical correlation and follow up recommended. Urological surgery   consultation.          US GUIDED NEEDLE PLACEMENT    (Results Pending)   US BIOPSY LYMPH NODE    (Results Pending)       Assessment:  Principal Problem:    Hydronephrosis due to obstruction of ureter  Active Problems:    Hydronephrosis of left
Oncology.    We will follow       Conception MANOHAR Lang - BENI   [facility]  Urology
region    Malignant neuroendocrine neoplasm [facility])  Resolved Problems:    * No resolved hospital problems. *      Plan:    Left sided hydronephrosis d/t ?obstruction of ureter - continue gentle hydration  - CT scan showed 1.9 cm soft tissue mass adjacent to the mid left ureter at the L3-L4 level and a 7 mm and 8 mm soft tissue densities are present with in the lumen of the distal left ureter at the L5 and S1 levels - urology on - oncology/hematology following - mag3 renal lasix scan showed diminished and delayed function of left kidney with prominent collecting system. Split function is 34% on the left and 66% on the right. - left flank pain stable - S/p cystoscopy retrograde pyelogram with insertion of left stent today - malhotra catheter inserted during surgery - IVF's - urine culture pending - will continue monitor      Left inguinal mass - pain improved - FNA biopsy to confirm suspected high grade neuroendocrine tumor by IR - oncology/hematology following - plan is treat with carbo-etoposide-tecentriq as outpatient      History of prostate cancer with mets to bone - Adenocarcinoma - received brachytherapy and chemotherapy - follows with Dr Prince Milligan and Dr. Cody Reed - PSA 0.04 on 3/9 - on Xtandi        Acute kidney injury - worsening creatinine - IVF's - strict I&O - avoid any nephrotoxic medication      Hyperlipidemia - stable      GERD - continue PPI      Anemia - no overt bleeding - continue monitor H&H      NOTE: This report was transcribed using voice recognition software. Every effort was made to ensure accuracy; however, inadvertent computerized transcription errors may be present.      Electronically signed by MANOHAR Mcnair CNP on 3/16/2023 at 8:07 AM
uroepithelial neoplasm. Suggest urological surgery   consultation for possible ureteroscopy. RECOMMENDATIONS:   Careful clinical correlation and follow up recommended. Urological surgery   consultation. 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 He had 6 cycles of carbo- etoposide completed May of 2022. His last CT scan Jan 2023 abd/pelvis was negative. He now is admitted with large left inguinal lymph node, left hydronephrosis from at least periureteral mass, most likely recurrent poorly diff high grade neuroendocrine tumor. A/P  FNA left inguinal node planned 3/17 to confirm suspected high grade neuroendocrine recurrence  Left hydronephrosis, urology has patient in OR for cytoscopy/stent placement today  Plan carbo-etoposide-tecentriq outpateint for poorly diff high grade neruoendocrine tumor   4. Adenocarcinoma of prostate well controlled with PSA <0.04      Thank you for allowing us to participate in the care of Leoncio Barlow. Karley Hamlin PA-C  [phone]    Electronically signed by MARTIN Srinivasan on 3/16/2023 at 9:23 AM    Note: This report was completed using [employer] voiced recognition software. Every effort has been made to ensure accuracy; however, inadvertent computerized transcription errors may be present. Addendum: Patient seen, examined and agree with above assessment and plan. If suspected tumor recurrence confirmed will plan on retreatment with carbo-etoposide-tecentriq given his good response previously and no treatments since May fo 2022.     Linsey Whyte MD
neuroendocrine tumor. A/P  FNA left inguinal node planned 3/17 to confirm suspected high grade neuroendocrine recurrence  Left hydronephrosis, urology has patient in OR for cytoscopy/stent placement today  Plan carbo-etoposide-tecentriq outpateint for poorly diff high grade neruoendocrine tumor   4. Adenocarcinoma of prostate well controlled with PSA <0.04    Patient will follow-up in a week outpatient for results of cytology and to determine if he needs to restart systemic treatment. Thank you for allowing us to participate in the care of Unruly Samuel. Lucero Argueta PA-C  [phone]    Electronically signed by MARTIN Henderson on 3/17/2023 at 8:29 AM    Note: This report was completed using [employer] voiced recognition software. Every effort has been made to ensure accuracy; however, inadvertent computerized transcription errors may be present.
was made to ensure accuracy; however, inadvertent computerized transcription errors may be present.      Electronically signed by MANOHAR Baez CNP on 3/18/2023 at 4:54 PM

## 2023-03-19 VITALS
WEIGHT: 215 LBS | DIASTOLIC BLOOD PRESSURE: 73 MMHG | TEMPERATURE: 97.5 F | HEIGHT: 70 IN | RESPIRATION RATE: 24 BRPM | OXYGEN SATURATION: 96 % | SYSTOLIC BLOOD PRESSURE: 117 MMHG | BODY MASS INDEX: 30.78 KG/M2 | HEART RATE: 84 BPM

## 2023-03-19 LAB
ANION GAP SERPL CALCULATED.3IONS-SCNC: 8 MMOL/L (ref 7–16)
BACTERIA UR CULT: NORMAL
BUN SERPL-MCNC: 14 MG/DL (ref 6–23)
CALCIUM SERPL-MCNC: 8.3 MG/DL (ref 8.6–10.2)
CHLORIDE SERPL-SCNC: 107 MMOL/L (ref 98–107)
CO2 SERPL-SCNC: 22 MMOL/L (ref 22–29)
CREAT SERPL-MCNC: 1 MG/DL (ref 0.7–1.2)
GLUCOSE SERPL-MCNC: 104 MG/DL (ref 74–99)
POTASSIUM SERPL-SCNC: 3.4 MMOL/L (ref 3.5–5)
SODIUM SERPL-SCNC: 137 MMOL/L (ref 132–146)

## 2023-03-19 PROCEDURE — APPSS45 APP SPLIT SHARED TIME 31-45 MINUTES: Performed by: NURSE PRACTITIONER

## 2023-03-19 PROCEDURE — 36415 COLL VENOUS BLD VENIPUNCTURE: CPT

## 2023-03-19 PROCEDURE — 80048 BASIC METABOLIC PNL TOTAL CA: CPT

## 2023-03-19 PROCEDURE — 6370000000 HC RX 637 (ALT 250 FOR IP): Performed by: FAMILY MEDICINE

## 2023-03-19 RX ORDER — POTASSIUM CHLORIDE 20 MEQ/1
40 TABLET, EXTENDED RELEASE ORAL ONCE
Status: COMPLETED | OUTPATIENT
Start: 2023-03-19 | End: 2023-03-19

## 2023-03-19 RX ADMIN — POTASSIUM CHLORIDE 40 MEQ: 1500 TABLET, EXTENDED RELEASE ORAL at 09:24

## 2023-03-19 NOTE — DISCHARGE SUMMARY
seen by several specialist.  Patient was seen by urology for left hydronephrosis with soft tissue mass adjacent and within the left ureter. Patient had MAG3 renal Lasix scan. On 3/16/2023 patient had a cystoscopy retrograde Polygram with insertion of left stent. Prior to discharge patient had Ramirez catheter out on 3/17/2023 and is voiding with no complications. Patient's urine culture remain negative. Hematology/oncology was consulted for left inguinal node tumor/mass concerns for high-grade neuroendocrine recurrence cancer. On 3/18/2023 patient underwent a fine-needle biopsy of left inguinal node to confirm suspected high-grade neuroendocrine cancer. Left inguinal lymph node biopsy pathology still pending. Per hematology/oncology plan for the patient is carbo-etoposide-tecentriq outpateint for poorly diff high grade neruoendocrine tumor. Patient has remained stable and afebrile, therefore he will be discharged home with the following medications.     Discharge Exam:  Vitals:    03/18/23 0754 03/18/23 1202 03/18/23 1859 03/19/23 0708   BP: 138/82  129/73 117/73   Pulse: 94  95 84   Resp: 18 18 20 24   Temp: 97.9 °F (36.6 °C)  97.4 °F (36.3 °C) 97.5 °F (36.4 °C)   TempSrc: Oral  Oral Oral   SpO2: 97%  98% 96%   Weight:       Height:           General Appearance: alert and oriented to person, place and time, well-developed and well-nourished, in no acute distress and obese  Skin: warm and dry  Head: normocephalic and atraumatic  Eyes: pupils equal, round, and reactive to light  ENT: hearing grossly normal bilaterally  Neck: neck supple and non tender without mass   Pulmonary/Chest: clear to auscultation bilaterally- no wheezes, rales or rhonchi, normal air movement, no respiratory distress  Cardiovascular: normal rate, regular rhythm, and intact distal pulses  Abdomen: soft, non-tender, non-distended, normal bowel sounds, no masses or organomegaly  Extremities: no cyanosis, no clubbing, and trace + edema-

## 2023-03-19 NOTE — DISCHARGE INSTRUCTIONS
Your information:  Name: Elena Smith  : 1945    Your instructions:    YOU ARE BEING DISCHARGED HOME. PLEASE MAKE AND KEEP YOUR FOLLOW UP APPOINTMENTS. IF YOU EXPERIENCE ANY OF THE FOLLOWING SYMPTOMS, CHEST PAIN, SHORTNESS OF BREATH, COUGHING UP BLOOD OR BLOODY SPUTUM, STOMACH PAIN OR CRAMPING, DARK, TARRY STOOLS, LOSS OF APPETITE, GENERAL NOT FEELING WELL, SIGNS AND SYMPTOMS OF INFECTION LIKE FEVER AND OR CHILLS, PLEASE CALL PCP OR RETURN TO THE EMERGENCY ROOM. What to do after you leave the hospital:    Recommended diet: regular diet    Recommended activity: activity as tolerated        The following personal items were collected during your admission and were returned to you:    Belongings  Dental Appliances: None  Vision - Corrective Lenses: None  Hearing Aid: None  Clothing: Other (Comment) (hospital gown)  Jewelry: None  Body Piercings Removed: N/A  Electronic Devices: None  Weapons (Notify Protective Services/Security): None  Other Valuables: Wallet, Crouch  Home Medications: Other (Comment) (in pharmacy)  Valuables Given To: Patient  Provide Name(s) of Who Valuable(s) Were Given To: Pt called wife and notified of surgery  Responsible person(s) in the waiting room: wife  Patient approves for provider to speak to responsible person post operatively: Yes (wife)    Information obtained by:  By signing below, I understand that if any problems occur once I leave the hospital I am to contact PCP. I understand and acknowledge receipt of the instructions indicated above.

## 2023-03-19 NOTE — PLAN OF CARE
Problem: Safety - Adult  Goal: Free from fall injury  Outcome: Completed     Problem: Pain  Goal: Verbalizes/displays adequate comfort level or baseline comfort level  3/19/2023 1045 by Monster Ledesma RN  Outcome: Completed  3/18/2023 2241 by Sarah Lopez RN  Outcome: Progressing     Problem: Discharge Planning  Goal: Discharge to home or other facility with appropriate resources  3/19/2023 1045 by Monster Ledesma RN  Outcome: Completed  3/18/2023 2241 by Sarah Lopez RN  Outcome: Progressing

## 2023-03-20 ENCOUNTER — CARE COORDINATION (OUTPATIENT)
Dept: CASE MANAGEMENT | Age: 78
End: 2023-03-20

## 2023-03-20 DIAGNOSIS — N13.1 HYDRONEPHROSIS DUE TO OBSTRUCTION OF URETER: Primary | ICD-10-CM

## 2023-03-20 LAB
Lab: NORMAL
REPORT: NORMAL
THIS TEST SENT TO: NORMAL

## 2023-03-20 PROCEDURE — 1111F DSCHRG MED/CURRENT MED MERGE: CPT | Performed by: INTERNAL MEDICINE

## 2023-03-20 NOTE — CARE COORDINATION
Provider Sri Hightower   4/6/2023  3:15 PM Baljinder Carr MD [facility]   7/27/2023  3:00 PM Baljinder Carr MD [facility]       Care Transition Nurse provided contact information. Plan for follow-up call in 7-10 days based on severity of symptoms and risk factors. Plan for next call: symptom management-any urinary issues?  follow-up appointment-urology/hem onc dates?     Wilian Singh RN

## 2023-03-30 ENCOUNTER — CARE COORDINATION (OUTPATIENT)
Dept: CASE MANAGEMENT | Age: 78
End: 2023-03-30

## 2023-03-30 NOTE — CARE COORDINATION
[facility] Care Transitions Follow Up Call    Patient Current Location:  Home: [place] [address] [address]    Care Transition Nurse contacted the patient by telephone to follow up after admission on 3/13/23. Patient: Sheryl Duane  Patient : 1945   MRN: [id number]  Reason for Admission: hydronephrosis d/t obstruction of ureter, s/p CYSTOSCOPY RETROGRADE PYELOGRAM LEFT STENT INSERTION  3/16/23  Discharge Date: 3/19/23 RARS: Readmission Risk Score: 11      Needs to be reviewed by the provider   Additional needs identified to be addressed with provider: No  none             Method of communication with provider: none.      -Spoke with patient for follow up care transition call.   -Patient reports he is \"doing fine\" and having no urinary issues. Patient currently states he is lying down resting. Patient admits he did not sleep well last night as wheezing awakened him from sleep. Patient states he has never had this symptom before and it has resolved. Patient states he has SOB with exertion which is not new, he relates it as a side effect to his chemo as he had SOB with exertion with his last round of chemo. CTN discussed with patient course of action if wheezing should return with persistence or worsening SOB with exertion  or occurring at rest.  -Patient denies any needs, questions, or concerns at this time and is agreeable to continued follow up from CTN. Addressed changes since last contact:   urology/ hem onc appointments scheduled    Discussed follow-up appointments. If no appointment was previously scheduled, appointment scheduling offered: n/a. Is follow up appointment scheduled within 7 days of discharge? No.  Patient chose to just keep 23 visit and CTN routed to PCP front office pool under high priority on last outreach to inform.     Follow Up  Future Appointments   Date Time Provider Sri Hightower   2023  3:15 PM [facility]лана [facility]   2023

## 2023-04-06 ENCOUNTER — OFFICE VISIT (OUTPATIENT)
Dept: FAMILY MEDICINE CLINIC | Age: 78
End: 2023-04-06

## 2023-04-06 VITALS
BODY MASS INDEX: 30.77 KG/M2 | SYSTOLIC BLOOD PRESSURE: 118 MMHG | HEIGHT: 70 IN | DIASTOLIC BLOOD PRESSURE: 70 MMHG | OXYGEN SATURATION: 100 % | TEMPERATURE: 97.6 F | HEART RATE: 86 BPM | WEIGHT: 214.9 LBS

## 2023-04-06 DIAGNOSIS — C61 PROSTATE CANCER METASTATIC TO BONE (HCC): ICD-10-CM

## 2023-04-06 DIAGNOSIS — N13.30 HYDRONEPHROSIS OF LEFT KIDNEY: Primary | ICD-10-CM

## 2023-04-06 DIAGNOSIS — Z96.0 S/P URETERAL STENT PLACEMENT: ICD-10-CM

## 2023-04-06 DIAGNOSIS — Z09 HOSPITAL DISCHARGE FOLLOW-UP: ICD-10-CM

## 2023-04-06 DIAGNOSIS — E78.5 HYPERLIPIDEMIA, UNSPECIFIED HYPERLIPIDEMIA TYPE: ICD-10-CM

## 2023-04-06 DIAGNOSIS — K21.9 GASTROESOPHAGEAL REFLUX DISEASE, UNSPECIFIED WHETHER ESOPHAGITIS PRESENT: ICD-10-CM

## 2023-04-06 DIAGNOSIS — C79.51 PROSTATE CANCER METASTATIC TO BONE (HCC): ICD-10-CM

## 2023-04-06 RX ORDER — TAMSULOSIN HYDROCHLORIDE 0.4 MG/1
0.4 CAPSULE ORAL DAILY
COMMUNITY

## 2023-04-06 NOTE — PROGRESS NOTES
OIL PO Take 1 capsule by mouth daily      calcium carbonate (TUMS) 500 MG chewable tablet Take 2 tablets by mouth 3 times daily as needed for Heartburn          Medications patient taking as of now reconciled against medications ordered at time of hospital discharge: Yes        Objective:    /70 (Position: Sitting)   Pulse 86   Temp 97.6 °F (36.4 °C) (Temporal)   Ht 5' 10\" (1.778 m)   Wt 214 lb 14.4 oz (97.5 kg)   SpO2 100%   BMI 30.83 kg/m²         An electronic signature was used to authenticate this note.   --Brandy Childress MD

## 2023-04-07 ENCOUNTER — CARE COORDINATION (OUTPATIENT)
Dept: CASE MANAGEMENT | Age: 78
End: 2023-04-07

## 2023-04-07 RX ORDER — ACETAMINOPHEN 500 MG
1000 TABLET ORAL PRN
COMMUNITY

## 2023-04-07 NOTE — CARE COORDINATION
[facility] Care Transitions Follow Up Call    Patient Current Location:  Home: Davian Mcconnell  [address] [place] [address] [address] [address]    Care Transition Nurse contacted the patient by telephone to follow up after admission on 3/13/23. Patient: Christelle Romero  Patient : 1945   MRN: [id number]  Reason for Admission: hydronephrosis d/t obstruction of ureter, s/p CYSTOSCOPY RETROGRADE PYELOGRAM LEFT STENT INSERTION  3/16/23  Discharge Date: 3/19/23 RARS: Readmission Risk Score: 11      Needs to be reviewed by the provider   Additional needs identified to be addressed with provider: No  none             Method of communication with provider: none.      -Spoke with patient for follow up care transition call.   -Patient reports has had no further wheezing, continues with SOB with exertion. Has back pain which is not new and that is relieved with applying ice pack and taking tylenol.  -Aware to obtain labs prior to next PCP appointment.  -Patient denies any needs, questions, or concerns at this time. -CTN will plan for final call next outreach. Addressed changes since last contact:  medications-tylenol added to med list in EMR  Completed PCP appointment. Discussed follow-up appointments. If no appointment was previously scheduled, appointment scheduling offered: n/a. Is follow up appointment scheduled within 7 days of discharge? No, discharged on 3/19/23 and saw PCP yesterday(23.)    Follow Up  Future Appointments   Date Time Provider Sri Hightower   2023  3:45 PM [facility]   2023  3:00 PM Chadwick Escoto MD [facility] [place]     Non-[facility] follow up appointment(s): urology(Dr JENSEN Link)23. Will be seeing hem/onc(Dr Tillman)on his next scheduled chemo treatment which is 23.       Patients top risk factors for readmission: medical condition-hydronephrosis, post cysto with stent, immunocompromised and multiple health system providers  Interventions to address

## 2023-04-19 ENCOUNTER — CARE COORDINATION (OUTPATIENT)
Dept: CASE MANAGEMENT | Age: 78
End: 2023-04-19

## 2023-04-19 NOTE — CARE COORDINATION
Interventions  No Identified Needs  Other Interventions:              No further follow-up call indicated based on severity of symptoms and risk factors.       Grecia Torres RN

## 2023-05-02 ENCOUNTER — HOSPITAL ENCOUNTER (OUTPATIENT)
Age: 78
Setting detail: OBSERVATION
Discharge: HOME | End: 2023-05-03
Attending: EMERGENCY MEDICINE | Admitting: FAMILY MEDICINE
Payer: MEDICARE

## 2023-05-02 DIAGNOSIS — R79.89 ELEVATED BRAIN NATRIURETIC PEPTIDE (BNP) LEVEL: ICD-10-CM

## 2023-05-02 DIAGNOSIS — R77.8 ELEVATED TROPONIN: ICD-10-CM

## 2023-05-02 DIAGNOSIS — R07.9 ACUTE CHEST PAIN: Primary | ICD-10-CM

## 2023-05-02 PROCEDURE — 93005 ELECTROCARDIOGRAM TRACING: CPT | Performed by: STUDENT IN AN ORGANIZED HEALTH CARE EDUCATION/TRAINING PROGRAM

## 2023-05-02 PROCEDURE — 99285 EMERGENCY DEPT VISIT HI MDM: CPT

## 2023-05-02 PROCEDURE — 96374 THER/PROPH/DIAG INJ IV PUSH: CPT

## 2023-05-02 ASSESSMENT — PAIN SCALES - GENERAL: PAINLEVEL_OUTOF10: 9

## 2023-05-02 ASSESSMENT — PAIN - FUNCTIONAL ASSESSMENT: PAIN_FUNCTIONAL_ASSESSMENT: 0-10

## 2023-05-03 ENCOUNTER — APPOINTMENT (OUTPATIENT)
Dept: NUCLEAR MEDICINE | Age: 78
End: 2023-05-03
Payer: MEDICARE

## 2023-05-03 ENCOUNTER — APPOINTMENT (OUTPATIENT)
Dept: CT IMAGING | Age: 78
End: 2023-05-03
Payer: MEDICARE

## 2023-05-03 ENCOUNTER — APPOINTMENT (OUTPATIENT)
Dept: NON INVASIVE DIAGNOSTICS | Age: 78
End: 2023-05-03
Payer: MEDICARE

## 2023-05-03 VITALS
DIASTOLIC BLOOD PRESSURE: 68 MMHG | BODY MASS INDEX: 29.13 KG/M2 | WEIGHT: 203 LBS | SYSTOLIC BLOOD PRESSURE: 104 MMHG | OXYGEN SATURATION: 97 % | RESPIRATION RATE: 19 BRPM | HEART RATE: 75 BPM | TEMPERATURE: 97.6 F

## 2023-05-03 PROBLEM — R07.9 CHEST PAIN: Status: ACTIVE | Noted: 2023-05-03

## 2023-05-03 PROBLEM — R07.9 ACUTE CHEST PAIN: Status: ACTIVE | Noted: 2023-05-03

## 2023-05-03 LAB
ABO + RH BLD: NORMAL
ALBUMIN SERPL-MCNC: 3.9 G/DL (ref 3.5–5.2)
ALP SERPL-CCNC: 80 U/L (ref 40–129)
ALT SERPL-CCNC: 16 U/L (ref 0–40)
ANION GAP SERPL CALCULATED.3IONS-SCNC: 9 MMOL/L (ref 7–16)
ANISOCYTOSIS: ABNORMAL
AST SERPL-CCNC: 15 U/L (ref 0–39)
BACTERIA URNS QL MICRO: ABNORMAL /HPF
BASOPHILS # BLD: 0.07 E9/L (ref 0–0.2)
BASOPHILS NFR BLD: 0.9 % (ref 0–2)
BILIRUB SERPL-MCNC: 0.9 MG/DL (ref 0–1.2)
BILIRUB UR QL STRIP: NEGATIVE
BLD GP AB SCN SERPL QL: NORMAL
BNP BLD-MCNC: 1507 PG/ML (ref 0–450)
BUN SERPL-MCNC: 22 MG/DL (ref 6–23)
CALCIUM SERPL-MCNC: 8.7 MG/DL (ref 8.6–10.2)
CHLORIDE SERPL-SCNC: 105 MMOL/L (ref 98–107)
CHOLESTEROL, TOTAL: 108 MG/DL (ref 0–199)
CLARITY UR: CLEAR
CO2 SERPL-SCNC: 24 MMOL/L (ref 22–29)
COLOR UR: ABNORMAL
CREAT SERPL-MCNC: 1 MG/DL (ref 0.7–1.2)
EOSINOPHIL # BLD: 0.07 E9/L (ref 0.05–0.5)
EOSINOPHIL NFR BLD: 0.9 % (ref 0–6)
ERYTHROCYTE [DISTWIDTH] IN BLOOD BY AUTOMATED COUNT: 17 FL (ref 11.5–15)
GLUCOSE SERPL-MCNC: 99 MG/DL (ref 74–99)
GLUCOSE UR STRIP-MCNC: NEGATIVE MG/DL
HCT VFR BLD AUTO: 25.3 % (ref 37–54)
HDLC SERPL-MCNC: 45 MG/DL
HGB BLD-MCNC: 8.7 G/DL (ref 12.5–16.5)
HGB UR QL STRIP: ABNORMAL
INR BLD: 1.4
KETONES UR STRIP-MCNC: NEGATIVE MG/DL
LACTATE BLDV-SCNC: 1.6 MMOL/L (ref 0.5–2.2)
LDLC SERPL CALC-MCNC: 52 MG/DL (ref 0–99)
LEUKOCYTE ESTERASE UR QL STRIP: NEGATIVE
LIPASE: 24 U/L (ref 13–60)
LV EF: 60 %
LV EF: 62 %
LVEF MODALITY: NORMAL
LVEF MODALITY: NORMAL
LYMPHOCYTES # BLD: 1.73 E9/L (ref 1.5–4)
LYMPHOCYTES NFR BLD: 23 % (ref 20–42)
MAGNESIUM SERPL-MCNC: 1.6 MG/DL (ref 1.6–2.6)
MCH RBC QN AUTO: 33.9 PG (ref 26–35)
MCHC RBC AUTO-ENTMCNC: 34.4 % (ref 32–34.5)
MCV RBC AUTO: 98.4 FL (ref 80–99.9)
METAMYELOCYTES NFR BLD MANUAL: 0.9 % (ref 0–1)
MONOCYTES # BLD: 0.82 E9/L (ref 0.1–0.95)
MONOCYTES NFR BLD: 10.6 % (ref 2–12)
NEUTROPHILS # BLD: 4.88 E9/L (ref 1.8–7.3)
NEUTS SEG NFR BLD: 63.7 % (ref 43–80)
NITRITE UR QL STRIP: NEGATIVE
NRBC BLD-RTO: 0.9 /100 WBC
OVALOCYTES: ABNORMAL
PH UR STRIP: 7.5 [PH] (ref 5–9)
PLATELET # BLD AUTO: 124 E9/L (ref 130–450)
PMV BLD AUTO: 10.3 FL (ref 7–12)
POIKILOCYTES: ABNORMAL
POTASSIUM SERPL-SCNC: 4.2 MMOL/L (ref 3.5–5)
PROT SERPL-MCNC: 6.8 G/DL (ref 6.4–8.3)
PROT UR STRIP-MCNC: NEGATIVE MG/DL
PROTHROMBIN TIME: 15.9 SEC (ref 9.3–12.4)
RBC # BLD AUTO: 2.57 E12/L (ref 3.8–5.8)
RBC #/AREA URNS HPF: ABNORMAL /HPF (ref 0–2)
SODIUM SERPL-SCNC: 138 MMOL/L (ref 132–146)
SP GR UR STRIP: <=1.005 (ref 1–1.03)
TRIGL SERPL-MCNC: 57 MG/DL (ref 0–149)
TROPONIN, HIGH SENSITIVITY: 54 NG/L (ref 0–11)
TROPONIN, HIGH SENSITIVITY: 55 NG/L (ref 0–11)
TROPONIN, HIGH SENSITIVITY: 56 NG/L (ref 0–11)
UROBILINOGEN UR STRIP-ACNC: 1 E.U./DL
VLDLC SERPL CALC-MCNC: 11 MG/DL
WBC # BLD: 7.5 E9/L (ref 4.5–11.5)
WBC #/AREA URNS HPF: ABNORMAL /HPF (ref 0–5)

## 2023-05-03 PROCEDURE — 6370000000 HC RX 637 (ALT 250 FOR IP): Performed by: STUDENT IN AN ORGANIZED HEALTH CARE EDUCATION/TRAINING PROGRAM

## 2023-05-03 PROCEDURE — 80061 LIPID PANEL: CPT

## 2023-05-03 PROCEDURE — 3430000000 HC RX DIAGNOSTIC RADIOPHARMACEUTICAL: Performed by: RADIOLOGY

## 2023-05-03 PROCEDURE — 6360000002 HC RX W HCPCS: Performed by: FAMILY MEDICINE

## 2023-05-03 PROCEDURE — 93018 CV STRESS TEST I&R ONLY: CPT | Performed by: INTERNAL MEDICINE

## 2023-05-03 PROCEDURE — 78452 HT MUSCLE IMAGE SPECT MULT: CPT

## 2023-05-03 PROCEDURE — 6370000000 HC RX 637 (ALT 250 FOR IP): Performed by: FAMILY MEDICINE

## 2023-05-03 PROCEDURE — 6360000002 HC RX W HCPCS: Performed by: STUDENT IN AN ORGANIZED HEALTH CARE EDUCATION/TRAINING PROGRAM

## 2023-05-03 PROCEDURE — 83735 ASSAY OF MAGNESIUM: CPT

## 2023-05-03 PROCEDURE — 85610 PROTHROMBIN TIME: CPT

## 2023-05-03 PROCEDURE — APPSS60 APP SPLIT SHARED TIME 46-60 MINUTES: Performed by: PHYSICIAN ASSISTANT

## 2023-05-03 PROCEDURE — 71275 CT ANGIOGRAPHY CHEST: CPT

## 2023-05-03 PROCEDURE — 93005 ELECTROCARDIOGRAM TRACING: CPT | Performed by: FAMILY MEDICINE

## 2023-05-03 PROCEDURE — 85025 COMPLETE CBC W/AUTO DIFF WBC: CPT

## 2023-05-03 PROCEDURE — G0378 HOSPITAL OBSERVATION PER HR: HCPCS

## 2023-05-03 PROCEDURE — 93016 CV STRESS TEST SUPVJ ONLY: CPT | Performed by: INTERNAL MEDICINE

## 2023-05-03 PROCEDURE — 36415 COLL VENOUS BLD VENIPUNCTURE: CPT

## 2023-05-03 PROCEDURE — 96372 THER/PROPH/DIAG INJ SC/IM: CPT

## 2023-05-03 PROCEDURE — 99222 1ST HOSP IP/OBS MODERATE 55: CPT | Performed by: FAMILY MEDICINE

## 2023-05-03 PROCEDURE — 93005 ELECTROCARDIOGRAM TRACING: CPT | Performed by: STUDENT IN AN ORGANIZED HEALTH CARE EDUCATION/TRAINING PROGRAM

## 2023-05-03 PROCEDURE — 86900 BLOOD TYPING SEROLOGIC ABO: CPT

## 2023-05-03 PROCEDURE — 81001 URINALYSIS AUTO W/SCOPE: CPT

## 2023-05-03 PROCEDURE — 6360000004 HC RX CONTRAST MEDICATION: Performed by: RADIOLOGY

## 2023-05-03 PROCEDURE — 74174 CTA ABD&PLVS W/CONTRAST: CPT

## 2023-05-03 PROCEDURE — 78452 HT MUSCLE IMAGE SPECT MULT: CPT | Performed by: INTERNAL MEDICINE

## 2023-05-03 PROCEDURE — 6360000004 HC RX CONTRAST MEDICATION: Performed by: FAMILY MEDICINE

## 2023-05-03 PROCEDURE — 99223 1ST HOSP IP/OBS HIGH 75: CPT | Performed by: INTERNAL MEDICINE

## 2023-05-03 PROCEDURE — 96374 THER/PROPH/DIAG INJ IV PUSH: CPT

## 2023-05-03 PROCEDURE — 6370000000 HC RX 637 (ALT 250 FOR IP): Performed by: INTERNAL MEDICINE

## 2023-05-03 PROCEDURE — 83690 ASSAY OF LIPASE: CPT

## 2023-05-03 PROCEDURE — 83605 ASSAY OF LACTIC ACID: CPT

## 2023-05-03 PROCEDURE — C8929 TTE W OR WO FOL WCON,DOPPLER: HCPCS

## 2023-05-03 PROCEDURE — 86850 RBC ANTIBODY SCREEN: CPT

## 2023-05-03 PROCEDURE — 6360000002 HC RX W HCPCS: Performed by: PHYSICIAN ASSISTANT

## 2023-05-03 PROCEDURE — 86901 BLOOD TYPING SEROLOGIC RH(D): CPT

## 2023-05-03 PROCEDURE — 2580000003 HC RX 258: Performed by: FAMILY MEDICINE

## 2023-05-03 PROCEDURE — 80053 COMPREHEN METABOLIC PANEL: CPT

## 2023-05-03 PROCEDURE — 93017 CV STRESS TEST TRACING ONLY: CPT

## 2023-05-03 PROCEDURE — 83880 ASSAY OF NATRIURETIC PEPTIDE: CPT

## 2023-05-03 PROCEDURE — 84484 ASSAY OF TROPONIN QUANT: CPT

## 2023-05-03 PROCEDURE — A9500 TC99M SESTAMIBI: HCPCS | Performed by: RADIOLOGY

## 2023-05-03 PROCEDURE — 2580000003 HC RX 258: Performed by: STUDENT IN AN ORGANIZED HEALTH CARE EDUCATION/TRAINING PROGRAM

## 2023-05-03 RX ORDER — TETRAKIS(2-METHOXYISOBUTYLISOCYANIDE)COPPER(I) TETRAFLUOROBORATE 1 MG/ML
30 INJECTION, POWDER, LYOPHILIZED, FOR SOLUTION INTRAVENOUS
Status: COMPLETED | OUTPATIENT
Start: 2023-05-03 | End: 2023-05-03

## 2023-05-03 RX ORDER — ASPIRIN 81 MG/1
324 TABLET, CHEWABLE ORAL ONCE
Status: COMPLETED | OUTPATIENT
Start: 2023-05-03 | End: 2023-05-03

## 2023-05-03 RX ORDER — NITROGLYCERIN 0.4 MG/1
0.4 TABLET SUBLINGUAL EVERY 5 MIN PRN
Status: DISCONTINUED | OUTPATIENT
Start: 2023-05-03 | End: 2023-05-03 | Stop reason: HOSPADM

## 2023-05-03 RX ORDER — TETRAKIS(2-METHOXYISOBUTYLISOCYANIDE)COPPER(I) TETRAFLUOROBORATE 1 MG/ML
10 INJECTION, POWDER, LYOPHILIZED, FOR SOLUTION INTRAVENOUS
Status: COMPLETED | OUTPATIENT
Start: 2023-05-03 | End: 2023-05-03

## 2023-05-03 RX ORDER — SODIUM CHLORIDE 0.9 % (FLUSH) 0.9 %
5-40 SYRINGE (ML) INJECTION EVERY 12 HOURS SCHEDULED
Status: DISCONTINUED | OUTPATIENT
Start: 2023-05-03 | End: 2023-05-03 | Stop reason: HOSPADM

## 2023-05-03 RX ORDER — SODIUM CHLORIDE 0.9 % (FLUSH) 0.9 %
5-40 SYRINGE (ML) INJECTION PRN
Status: DISCONTINUED | OUTPATIENT
Start: 2023-05-03 | End: 2023-05-03 | Stop reason: HOSPADM

## 2023-05-03 RX ORDER — MORPHINE SULFATE 4 MG/ML
4 INJECTION, SOLUTION INTRAMUSCULAR; INTRAVENOUS ONCE
Status: DISCONTINUED | OUTPATIENT
Start: 2023-05-03 | End: 2023-05-03 | Stop reason: HOSPADM

## 2023-05-03 RX ORDER — ACETAMINOPHEN 650 MG/1
650 SUPPOSITORY RECTAL EVERY 6 HOURS PRN
Status: DISCONTINUED | OUTPATIENT
Start: 2023-05-03 | End: 2023-05-03 | Stop reason: HOSPADM

## 2023-05-03 RX ORDER — M-VIT,TX,IRON,MINS/CALC/FOLIC 27MG-0.4MG
1 TABLET ORAL DAILY
Status: DISCONTINUED | OUTPATIENT
Start: 2023-05-03 | End: 2023-05-03 | Stop reason: HOSPADM

## 2023-05-03 RX ORDER — FUROSEMIDE 20 MG/1
20 TABLET ORAL DAILY
Status: DISCONTINUED | OUTPATIENT
Start: 2023-05-03 | End: 2023-05-03 | Stop reason: HOSPADM

## 2023-05-03 RX ORDER — FENTANYL CITRATE 0.05 MG/ML
25 INJECTION, SOLUTION INTRAMUSCULAR; INTRAVENOUS ONCE
Status: COMPLETED | OUTPATIENT
Start: 2023-05-03 | End: 2023-05-03

## 2023-05-03 RX ORDER — CALCIUM CARBONATE 200(500)MG
2 TABLET,CHEWABLE ORAL 3 TIMES DAILY PRN
Status: DISCONTINUED | OUTPATIENT
Start: 2023-05-03 | End: 2023-05-03 | Stop reason: HOSPADM

## 2023-05-03 RX ORDER — ACETAMINOPHEN 325 MG/1
650 TABLET ORAL EVERY 6 HOURS PRN
Status: DISCONTINUED | OUTPATIENT
Start: 2023-05-03 | End: 2023-05-03 | Stop reason: HOSPADM

## 2023-05-03 RX ORDER — SODIUM CHLORIDE 9 MG/ML
INJECTION, SOLUTION INTRAVENOUS PRN
Status: DISCONTINUED | OUTPATIENT
Start: 2023-05-03 | End: 2023-05-03 | Stop reason: HOSPADM

## 2023-05-03 RX ORDER — ASPIRIN 81 MG/1
81 TABLET, CHEWABLE ORAL DAILY
Status: DISCONTINUED | OUTPATIENT
Start: 2023-05-04 | End: 2023-05-03 | Stop reason: HOSPADM

## 2023-05-03 RX ORDER — TAMSULOSIN HYDROCHLORIDE 0.4 MG/1
0.4 CAPSULE ORAL DAILY
Status: DISCONTINUED | OUTPATIENT
Start: 2023-05-03 | End: 2023-05-03 | Stop reason: HOSPADM

## 2023-05-03 RX ORDER — NITROGLYCERIN 0.4 MG/1
0.4 TABLET SUBLINGUAL ONCE
Status: COMPLETED | OUTPATIENT
Start: 2023-05-03 | End: 2023-05-03

## 2023-05-03 RX ORDER — ATORVASTATIN CALCIUM 40 MG/1
40 TABLET, FILM COATED ORAL NIGHTLY
Status: DISCONTINUED | OUTPATIENT
Start: 2023-05-03 | End: 2023-05-03 | Stop reason: HOSPADM

## 2023-05-03 RX ORDER — ENOXAPARIN SODIUM 100 MG/ML
40 INJECTION SUBCUTANEOUS DAILY
Status: DISCONTINUED | OUTPATIENT
Start: 2023-05-03 | End: 2023-05-03 | Stop reason: HOSPADM

## 2023-05-03 RX ORDER — 0.9 % SODIUM CHLORIDE 0.9 %
1000 INTRAVENOUS SOLUTION INTRAVENOUS ONCE
Status: COMPLETED | OUTPATIENT
Start: 2023-05-03 | End: 2023-05-03

## 2023-05-03 RX ADMIN — Medication 10 MILLICURIE: at 09:24

## 2023-05-03 RX ADMIN — REGADENOSON 0.4 MG: 0.08 INJECTION, SOLUTION INTRAVENOUS at 11:09

## 2023-05-03 RX ADMIN — IOPAMIDOL 80 ML: 755 INJECTION, SOLUTION INTRAVENOUS at 00:18

## 2023-05-03 RX ADMIN — PERFLUTREN 1.5 ML: 6.52 INJECTION, SUSPENSION INTRAVENOUS at 11:51

## 2023-05-03 RX ADMIN — Medication 30 MILLICURIE: at 11:11

## 2023-05-03 RX ADMIN — ENOXAPARIN SODIUM 40 MG: 100 INJECTION SUBCUTANEOUS at 08:48

## 2023-05-03 RX ADMIN — FUROSEMIDE 20 MG: 20 TABLET ORAL at 13:06

## 2023-05-03 RX ADMIN — SODIUM CHLORIDE 1000 ML: 9 INJECTION, SOLUTION INTRAVENOUS at 01:32

## 2023-05-03 RX ADMIN — FENTANYL CITRATE 25 MCG: 0.05 INJECTION, SOLUTION INTRAMUSCULAR; INTRAVENOUS at 00:24

## 2023-05-03 RX ADMIN — NITROGLYCERIN 0.4 MG: 0.4 TABLET, ORALLY DISINTEGRATING SUBLINGUAL at 01:31

## 2023-05-03 RX ADMIN — SODIUM CHLORIDE, PRESERVATIVE FREE 10 ML: 5 INJECTION INTRAVENOUS at 08:48

## 2023-05-03 RX ADMIN — TAMSULOSIN HYDROCHLORIDE 0.4 MG: 0.4 CAPSULE ORAL at 08:48

## 2023-05-03 RX ADMIN — ASPIRIN 81 MG 324 MG: 81 TABLET ORAL at 01:03

## 2023-05-03 RX ADMIN — MULTIPLE VITAMINS W/ MINERALS TAB 1 TABLET: TAB at 08:48

## 2023-05-03 ASSESSMENT — PAIN DESCRIPTION - DESCRIPTORS
DESCRIPTORS: ACHING;DULL
DESCRIPTORS: SHARP;TEARING
DESCRIPTORS: TEARING;SHARP

## 2023-05-03 ASSESSMENT — ENCOUNTER SYMPTOMS
VOMITING: 0
DIARRHEA: 0
RHINORRHEA: 0
SHORTNESS OF BREATH: 0
NAUSEA: 0
BACK PAIN: 1
ABDOMINAL PAIN: 0
SORE THROAT: 0
COUGH: 0

## 2023-05-03 ASSESSMENT — PAIN DESCRIPTION - LOCATION
LOCATION: CHEST
LOCATION: CHEST
LOCATION: CHEST;STERNUM

## 2023-05-03 ASSESSMENT — PAIN SCALES - GENERAL
PAINLEVEL_OUTOF10: 2
PAINLEVEL_OUTOF10: 0
PAINLEVEL_OUTOF10: 8
PAINLEVEL_OUTOF10: 8

## 2023-05-03 ASSESSMENT — PAIN - FUNCTIONAL ASSESSMENT
PAIN_FUNCTIONAL_ASSESSMENT: NONE - DENIES PAIN
PAIN_FUNCTIONAL_ASSESSMENT: 0-10

## 2023-05-03 ASSESSMENT — PAIN DESCRIPTION - FREQUENCY: FREQUENCY: INTERMITTENT

## 2023-05-03 ASSESSMENT — PAIN DESCRIPTION - ORIENTATION
ORIENTATION: MID
ORIENTATION: MID

## 2023-05-03 ASSESSMENT — PAIN DESCRIPTION - PAIN TYPE: TYPE: ACUTE PAIN

## 2023-05-03 ASSESSMENT — PAIN DESCRIPTION - ONSET: ONSET: SUDDEN

## 2023-05-03 NOTE — CONSULTS
PND, orthopnea or significant peripheral edema. Please note: past medical records were reviewed per electronic medical record (EMR) - see detailed reports under Past Medical/ Surgical History. PAST MEDICAL HISTORY:    GERD  Prostate CA with mets to bone, currently on chemotherapy (started 1.5 months ago)  BPH  L nephrolithiasis with hydronephrosis s/p L ureteral stent placement 3/16/2023  Former tobacco smoker   HLD  Chronic anemia   History of requiring recent transfusion due to acute on chronic anemia  [place]      CARDIAC TESTING    2/5/2018 - Exercise Stress [facility]  The stress test was terminated due to the following: Fatigue. Peak  bpm. Exercise duration 6 min 45 sec. (103% MPHR) (7.6 METS)  Peak  mmHg/90 mmHg  Patient experienced no symptoms during stress. Stress ECG normal ST segment response. Stress complications: none. The left ventricular cavity size is unchanged with stress. 1. SPECT Perfusion Study: Normal.  2. Average functional capacity for age and gender. 3. There is no scintigraphic evidence for inducible ischemia. 4. No evidence of scarred myocardium. 5. Left ventricle is normal in size. The left ventricle systolic function is normal.  6. Right ventricle is normal in size. The right ventricle systolic function is normal.  7. This is a low risk scan.     PAST SURGICAL HISTORY:    Past Surgical History:   Procedure Laterality Date    APPENDECTOMY      BLADDER SURGERY Left 3/16/2023    CYSTOSCOPY RETROGRADE PYELOGRAM LEFT STENT INSERTION **CALL WITH TIME** performed by Shahbaz Ratliff MD at [address] Right     CATHETER INSERTION N/A 2/22/2022    MEDIPORT CATHETER INSERTION performed by Chris Barry MD at [address]  11/02/2016    KNEE SURGERY Right     TONSILLECTOMY      US LYMPH NODE BIOPSY  12/30/2021    US LYMPH NODE BIOPSY 12/30/2021 [facility] ULTRASOUND    US LYMPH NODE BIOPSY  3/17/2023    US LYMPH NODE BIOPSY 3/17/2023 [id number]

## 2023-05-03 NOTE — DISCHARGE SUMMARY
[facility] Physician Discharge Summary       No follow-up provider specified. Activity level: As tolerated     Dispo: Home      Condition on discharge: Stable     Patient ID:  Kishor Ott  [id number]  89 y.o.  1945    Admit date: 5/2/2023    Discharge date and time:  5/3/2023  2:04 PM    Admission Diagnoses: Principal Problem:    Chest pain  Active Problems:    Prostate cancer metastatic to bone [facility])    Hyperlipidemia    S/P ureteral stent placement    Acute chest pain  Resolved Problems:    * No resolved hospital problems. *      Discharge Diagnoses: Principal Problem:    Chest pain  Active Problems:    Prostate cancer metastatic to bone (HCC)    Hyperlipidemia    S/P ureteral stent placement    Acute chest pain  Resolved Problems:    * No resolved hospital problems. *      Consults:  IP CONSULT TO CARDIOLOGY    Hospital Course:   Patient Kishor Ott is a 76 y.o. presented with Chest pain [R07.9]  Elevated troponin [R77.8]  Acute chest pain [R07.9]  Elevated brain natriuretic peptide (BNP) level [R79.89]    Chest pain  Elevated trop  - ASA, statin were initiated, LDL is at goal, stop both  Type II MI, with trops remaining stable 55-56-54  - cardiology c/s stress test was with no reversible ischemia  Echo with no WMA  Chest pain resolved  Discharge home stable no asa, statin or BB  Patient is to follow up with cardiology as outpatient to decide the need for the above meds, and to decide if a heart cath is indicated.   This might be due to a combination of chemo, anemia and metastatic prostate cancer       HLD  - fasting lipid panel with LDL at goal, no indication for a statin     Prostate cancer with bone metastasis  Neuroendocrine tumor  - follows with heme/onc, on chemo  - continue home Xtandi     Nephrolithiasis with ureteral stent  - UA without sign of acute infection     Anemia  - s/p 2 units PRBC as outpatient  - hgb 8.7    Discharge Exam:    General Appearance: alert

## 2023-05-03 NOTE — ACP (ADVANCE CARE PLANNING)
Advance Care Planning   Healthcare Decision Maker:    Primary Decision Maker: Alexa Beltran [facility] - [phone]

## 2023-05-03 NOTE — CARE COORDINATION
Case Management Assessment  Initial Evaluation    Date/Time of Evaluation: 5/3/2023 1:13 PM  Assessment Completed by: Serge De Oliveira RN    If patient is discharged prior to next notation, then this note serves as note for discharge by case management. Patient Name: Claudine Batres                   YOB: 1945  Diagnosis: Chest pain [R07.9]  Elevated troponin [R77.8]  Acute chest pain [R07.9]  Elevated brain natriuretic peptide (BNP) level [R79.89]                   Date / Time: 5/2/2023 11:29 PM    Patient Admission Status: Observation   Readmission Risk (Low < 19, Mod (19-27), High > 27): Readmission Risk Score: 11    Current PCP: Rudy El MD  PCP verified by CM? Yes    Chart Reviewed: Yes      History Provided by: Patient  Patient Orientation: Alert and Oriented, Person, Place    Patient Cognition: Alert    Hospitalization in the last 30 days (Readmission):  No    If yes, Readmission Assessment in  Navigator will be completed. Advance Directives:      Code Status: Full Code   Patient's Primary Decision Maker is: Legal Next of Kin    Primary Decision Maker: Maddi Hanna - Spouse - [phone]    Discharge Planning:    Patient lives with: Spouse/Significant Other Type of Home: House  Primary Care Giver: Self  Patient Support Systems include: Spouse/Significant Other   Current Financial resources: Medicare  Current community resources:    Current services prior to admission: None            Current DME:              Type of Home Care services:  None    ADLS  Prior functional level: Independent in ADLs/IADLs  Current functional level: Independent in ADLs/IADLs    PT AM-PAC:   /24  OT AM-PAC:   /24    Family can provide assistance at DC: Yes  Would you like Case Management to discuss the discharge plan with any other family members/significant others, and if so, who?  Yes (with wife Elena Pope if needed)  Plans to Return to Present Housing: Yes  Potential Assistance needed at discharge: N/A

## 2023-05-03 NOTE — ED PROVIDER NOTES
[address]  ED Provider Note  Department of Emergency Medicine     ED Room:       Written by: Clint Wren DO  Patient Name: Ray Martinez  Attending Provider: Megan Parks DO  Admit Date: 2023 11:29 PM  MRN: [id number]    : 1945        Chief Complaint   Patient presents with    Chest Pain     Started about one hour ago. Pain 9/10. Patient states that it radiates into back. HPI   Ray Martinez is a 76 y.o. male presenting to the ED for evaluation of Chest Pain (Started about one hour ago. Pain 9/10. Patient states that it radiates into back.)      History obtained from the patient. Limitations to history : None      Patient is a 68-year-old male with past medical history of prostate cancer on chemotherapy starting a few months ago, Gómez's esophagus, acid reflux, HLD, and history of hydronephrosis of the left kidney s/p stent placement with urology. He is presenting to the ED for evaluation of sudden onset substernal chest pain radiating to his back waking him from sleep starting about 2 hours prior to arrival.  He denies any numbness or weakness anywhere. Denies any shortness of breath. Denies any history of symptoms like this before. Denies any nausea vomiting or sudden onset diaphoresis. Denies any abdominal pain. Per chart review patient's last stress test was in 2018 and was normal/low risk. He has not had any stress test since then. He does not take blood thinning medications. Denies any recent illnesses, fever, cough or sore throat, numbness or weakness anywhere, headaches or changes in vision, dizziness, lower extremity edema or tenderness. Notably, the patient states his blood count was low today at his chemotherapy session so he received 2 units PRBCs. This was earlier in the day, he did not have pain at that time. He does deny exertional symptoms.         Nursing Notes were all reviewed and agreed with or any disagreements

## 2023-05-03 NOTE — ED NOTES
Radiology Procedure Waiver   Name: Leoncio Barlow  : 1945  MRN: [id number]    Date:  5/3/23    Time: 12:07 AM EDT    Benefits of immediately proceeding with radiology exam(s) without pre-testing outweigh the risks or are not indicated as specified below and therefore the following is/are being waived:    [x] Benefits of immediate radiology exam(s) outweigh any risk. OR    Pre-exam testing is not indicated for the following reason(s):  [] Pregnancy test   [] Patients LMP on-time and regular.   [] Patient had Tubal Ligation or has other Contraception Device. [] Patient  is Menopausal or Premenarcheal.    [] Patient had Full or Partial Hysterectomy. [] Protocol for CT contrast allegry   [] Patient has tolerated well previously   [] Patient does not have a true allergy    [] MRI Questionnaire     [x] BUN/Creatinine   [] Patient age w/no hx of renal dysfunction. [] Patient on Dialysis. [] Recent Normal Labs. Electronically signed by Sundeep Corrales DO on 5/3/23 at 12:07 AM EDT          Emergent -- needs eval for dissection. Benefit outweighs risk.         Sundeep Corrales DO  Resident  [phone]       Fransisca Feliz DO  23 [phone]

## 2023-05-03 NOTE — PROGRESS NOTES
Lexiscan Stress Test:    Reason for study: Chest pain    Resting EKG showed sinus rhythm  Exam:  Heart - regular, Lungs- clear    Patient received 0.4mg Lexiscan per protocol    Symptoms: No chest pain, No short of breath    EKG:  No ischemia or arrhythmia during Lexiscan infusion. Maximal heart rate 91       Peak BP 12/81 mmHg       Post test complications: None    SPECT image report pending.     Electronically signed by Jesus Peres MD on 5/3/2023 at 11:21 AM

## 2023-05-03 NOTE — H&P
[address] [place], [place] [address]ist Group   History and Physical      CHIEF COMPLAINT:  chest pain, back pain    History of Present Illness:  76 y.o. male with a history of metastatic prostate cancer, nephrolithiasis presents with midsternal sharp chest pain which he states was extending down his ribcage. Also had lower back pain, which he states he has been having due to nephrolithiasis and left ureteral stent which is supposed to be removed next week. Has had SOB since starting chemo, but states is no worse than usual.  States he had bloodwork drawn this week prior to chemo and due to low blood count he did not get his chemo and instead was transfused 2 units PRBC. Workup in ED significant for troponin 55 and 56, pro-BNP 1507, hgb 8.7. CTA chest/abd negative for dissection. Given 1L bolus and fentanyl with improvement in pain but then chest pain returned. At that point given nitro and ASA with resolution of chest pain. Informant(s) for H&P: patient, chart    REVIEW OF SYSTEMS:  no fevers, chills, n/v, ha, vision/hearing changes, wt changes, hot/cold flashes, other open skin lesions, diarrhea, constipation, dysuria/hematuria unless noted in HPI. Complete ROS performed with the patient and is otherwise negative.       PMH:  Past Medical History:   Diagnosis Date    Acid reflux     Barrette esophagus     Gómez esophagus     BPH (benign prostatic hyperplasia)     Cancer ([address] 06/2021    prostrate cancer     [place]     bilateral hearing aids    Pernicious anemia        Surgical History:  Past Surgical History:   Procedure Laterality Date    APPENDECTOMY      BLADDER SURGERY Left 3/16/2023    CYSTOSCOPY RETROGRADE PYELOGRAM LEFT STENT INSERTION **CALL WITH TIME** performed by Licha Archer MD at [address] Right     CATHETER INSERTION N/A 2/22/2022    MEDIPORT CATHETER INSERTION performed by Sugar Park MD at [address]  11/02/2016    KNEE

## 2023-05-03 NOTE — DISCHARGE INSTRUCTIONS
Your information:  Name: Stanislaw Obregon  : 1945    Your instructions: You are being discharged home. Please follow up with your physician at discharge and take your medications as prescribed. What to do after you leave the hospital:    Recommended diet: {diet:03221}    Recommended activity: {discharge activity:28563}        The following personal items were collected during your admission and were returned to you:    Belongings  Dental Appliances: Partials, Uppers, At home  Vision - Corrective Lenses: None  Hearing Aid: Bilateral hearing aids, At bedside  Clothing: Footwear, Shirt, Pants, Belt, Socks, Sweater, Undergarments  Jewelry: None  Electronic Devices: Cell Phone  Weapons (Notify Protective Services/Security): None  Home Medications: None  Valuables Given To: Patient  Provide Name(s) of Who Valuable(s) Were Given To: patient    Information obtained by:  By signing below, I understand that if any problems occur once I leave the hospital I am to contact ***. I understand and acknowledge receipt of the instructions indicated above.

## 2023-05-04 ENCOUNTER — CARE COORDINATION (OUTPATIENT)
Dept: CASE MANAGEMENT | Age: 78
End: 2023-05-04

## 2023-05-04 DIAGNOSIS — R07.9 CHEST PAIN, UNSPECIFIED TYPE: Primary | ICD-10-CM

## 2023-05-04 PROCEDURE — 1111F DSCHRG MED/CURRENT MED MERGE: CPT | Performed by: INTERNAL MEDICINE

## 2023-05-04 RX ORDER — PANTOPRAZOLE SODIUM 40 MG/1
40 TABLET, DELAYED RELEASE ORAL DAILY
COMMUNITY

## 2023-05-04 NOTE — CARE COORDINATION
I called patient to schedule a hospital follow up w/Dr. Arnold Plasencia within 7 days. I offered patient an appt tomorrow in [facility], however, patient declined due to other appts and stated he has several appts next week and is unable to schedule until the following Friday. Appt made in [facility] 5/12/23 at 11:15 am.  I provided patient with the address for [facility].

## 2023-05-04 NOTE — CARE COORDINATION
[facility] Care Transitions Initial Follow Up Call    Call within 2 business days of discharge: Yes    Patient Current Location:  Home: Tracey Sat  [address] [place] [address] [address] [address]    Care Transition Nurse contacted the patient by telephone to perform post hospital discharge assessment. Verified name and  with patient as identifiers. Provided introduction to self, and explanation of the Care Transition Nurse role. Patient: Wendy Grant Patient : 1945   MRN: [id number]  Reason for Admission: Chest Pain  Discharge Date: 5/3/23 RARS: Readmission Risk Score: 11      Last Discharge  Street       Date Complaint Diagnosis Description Type Department Provider    23 Chest Pain Acute chest pain . .. ED to Hosp-Admission (Discharged) (ADMITTED) [facility] Clarissa Babb MD; Dahlia Zaldivar,... Was this an external facility discharge? No Discharge Facility: [facility]    Challenges to be reviewed by the provider   Additional needs identified to be addressed with provider: No  none               Method of communication with provider: none. Spoke with patient today 23 for initial TCM/hospital discharge (low risk) follow up for chest pain. Patient states she is doing better since discharge and offers no complaints. States chest pain has resolved. Denies any worsening shortness of breath and is at baseline as patient verbalizes he gets SOB that revolves around chemotherapy treatments as patient suffers from prostate cancer. States next chemo treatment is scheduled for 23 at [facility]. States he follows with Dr. Paola Osuna (Hem/Onc). States having a chemo port to left chest with no issues. Patient states he had an injection at [facility] for low blood count on Monday and received 2 units of blood that was transfused. States he started not feeling good on Tuesday which worsened causing him to seek treatment at hospital on Wednesday.      Denies any nausea, vomiting, diarrhea, chills or

## 2023-05-05 LAB
EKG ATRIAL RATE: 75 BPM
EKG ATRIAL RATE: 79 BPM
EKG ATRIAL RATE: 83 BPM
EKG P AXIS: 24 DEGREES
EKG P AXIS: 35 DEGREES
EKG P AXIS: 37 DEGREES
EKG P-R INTERVAL: 174 MS
EKG P-R INTERVAL: 184 MS
EKG P-R INTERVAL: 186 MS
EKG Q-T INTERVAL: 392 MS
EKG Q-T INTERVAL: 416 MS
EKG Q-T INTERVAL: 428 MS
EKG QRS DURATION: 94 MS
EKG QRS DURATION: 98 MS
EKG QRS DURATION: 98 MS
EKG QTC CALCULATION (BAZETT): 460 MS
EKG QTC CALCULATION (BAZETT): 477 MS
EKG QTC CALCULATION (BAZETT): 477 MS
EKG R AXIS: -43 DEGREES
EKG R AXIS: -48 DEGREES
EKG R AXIS: -55 DEGREES
EKG T AXIS: 37 DEGREES
EKG T AXIS: 37 DEGREES
EKG T AXIS: 58 DEGREES
EKG VENTRICULAR RATE: 75 BPM
EKG VENTRICULAR RATE: 79 BPM
EKG VENTRICULAR RATE: 83 BPM

## 2023-05-05 PROCEDURE — 93010 ELECTROCARDIOGRAM REPORT: CPT | Performed by: INTERNAL MEDICINE

## 2023-05-10 DIAGNOSIS — K21.9 GASTROESOPHAGEAL REFLUX DISEASE, UNSPECIFIED WHETHER ESOPHAGITIS PRESENT: ICD-10-CM

## 2023-05-11 ENCOUNTER — CARE COORDINATION (OUTPATIENT)
Dept: CASE MANAGEMENT | Age: 78
End: 2023-05-11

## 2023-05-11 RX ORDER — PANTOPRAZOLE SODIUM 40 MG/1
40 TABLET, DELAYED RELEASE ORAL DAILY
Qty: 90 TABLET | Refills: 0 | OUTPATIENT
Start: 2023-05-11 | End: 2023-08-09

## 2023-05-11 NOTE — CARE COORDINATION
[facility] Care Transitions Follow Up Call    Patient Current Location: [address]  Transition Nurse contacted the patient by telephone to follow up after admission on 5/3/23. Verified name and  with patient as identifiers. Patient: Marco Davison  Patient : 1945   MRN: [id number]  Reason for Admission: Chest Pain  Discharge Date: 5/3/23 RARS: Readmission Risk Score: 11      Needs to be reviewed by the provider   Additional needs identified to be addressed with provider: No  none             Method of communication with provider: none. Spoke with patient today 23 for TCM/hospital discharge follow up sub call for chest pain. Patient states he continues with shortness of breath that revolves around chemotherapy treatments (neuroendocrine/prostate cancer). States next scheduled chemo treatments are 5/15/23, 1623, and 23. Patient states he is scheduled to follow up with Dr. Jose Juan Farley (Hem/Onc) at [facility]. States having chemo port to left chest with no issues or s/s of infection. Denies any chest pain, chest discomfort, arm pain, shoulder pain, neck pain, jaw pain or back pain. CTN reiterated s/s of heart attack and knowing when to seek immediate medical attention. Patient states he has yet to schedule HFU appt with Dr. Jossy Maurice but agrees to call and make one. CTN confirmed that PCP office attempted to schedule 7 day HFU appt with patient but he was unable commit d/t having several other appts. Noted HFU appt with scheduled with Dr. Eric Ferrari (PCP) on 23 which patient canceled as he verbalizes to this CTN \" I'm seeing too many other doctor appointments\". Patient denies any other complaints or needs at this time. CTN will continue to follow for Care Transition. Addressed changes since last contact:  refused-Patient refuses to follow up with PCP at this time. Discussed follow-up appointments.  If no appointment was previously scheduled, appointment

## 2023-05-18 ENCOUNTER — CARE COORDINATION (OUTPATIENT)
Dept: CASE MANAGEMENT | Age: 78
End: 2023-05-18

## 2023-05-18 NOTE — CARE COORDINATION
[address] Care Transitions Follow Up Call    Patient Current Location: [address] Transition Nurse contacted the patient by telephone to follow up after admission on 5/3/23. Verified name and  with patient as identifiers. Patient: Morgan Montero  Patient : 1945   MRN: [id number]  Reason for Admission: Chest Pain  Discharge Date: 5/3/23 RARS: Readmission Risk Score: 11      Needs to be reviewed by the provider   Additional needs identified to be addressed with provider: No  none             Method of communication with provider: none. Spoke with patient today 23 for final TCM/hospital discharge (low risk) follow up call. Patient states he had chemo this week on 5/15, , and . States having shortness of breath with exertion and dizziness associated with getting chemo. Denies any chest pain, chest discomfort, nausea, vomiting, diarrhea, chills or fever. Advised to dangle for 1-2 minutes after long rest periods or jeannine thing in am and rise up slowly to prevent dizziness. Patient states he is drinking plenty of fluids to stay hydrated. States he gets hydration therapy with chemo. Confirmed with patient he has yet to schedule follow up appt with Dr. Kiara Hemphill (Cardio) or r/s with PCP which he will do. CTN emphasized the importance to follow up with these two doctors. Patient denies any other complaints or concerns. Patient is in agreement to final call. CTN signing off for Care Transition. Addressed changes since last contact:  none  Discussed follow-up appointments. If no appointment was previously scheduled, appointment scheduling offered: Yes. Is follow up appointment scheduled within 7 days of discharge?  No.    Follow Up  Future Appointments   Date Time Provider Sri Hightower   2023  3:45 PM Vipul Nix MD [facility]   2023  3:00 PM Vipul Nix MD [facility]     Care Transition Nurse reviewed discharge instructions, medical

## 2023-06-20 ENCOUNTER — OFFICE VISIT (OUTPATIENT)
Dept: CARDIOLOGY CLINIC | Age: 78
End: 2023-06-20
Payer: MEDICARE

## 2023-06-20 VITALS
SYSTOLIC BLOOD PRESSURE: 104 MMHG | HEART RATE: 83 BPM | WEIGHT: 210 LBS | DIASTOLIC BLOOD PRESSURE: 74 MMHG | RESPIRATION RATE: 16 BRPM | HEIGHT: 70 IN | BODY MASS INDEX: 30.06 KG/M2

## 2023-06-20 DIAGNOSIS — R07.9 CHEST PAIN, UNSPECIFIED TYPE: Primary | ICD-10-CM

## 2023-06-20 PROCEDURE — G8417 CALC BMI ABV UP PARAM F/U: HCPCS | Performed by: PHYSICIAN ASSISTANT

## 2023-06-20 PROCEDURE — 1036F TOBACCO NON-USER: CPT | Performed by: PHYSICIAN ASSISTANT

## 2023-06-20 PROCEDURE — G8427 DOCREV CUR MEDS BY ELIG CLIN: HCPCS | Performed by: PHYSICIAN ASSISTANT

## 2023-06-20 PROCEDURE — 1123F ACP DISCUSS/DSCN MKR DOCD: CPT | Performed by: PHYSICIAN ASSISTANT

## 2023-06-20 PROCEDURE — 99213 OFFICE O/P EST LOW 20 MIN: CPT | Performed by: PHYSICIAN ASSISTANT

## 2023-06-20 PROCEDURE — 93000 ELECTROCARDIOGRAM COMPLETE: CPT | Performed by: PHYSICIAN ASSISTANT

## 2023-07-06 ENCOUNTER — OFFICE VISIT (OUTPATIENT)
Dept: FAMILY MEDICINE CLINIC | Age: 78
End: 2023-07-06
Payer: MEDICARE

## 2023-07-06 VITALS
HEIGHT: 70 IN | WEIGHT: 208 LBS | BODY MASS INDEX: 29.78 KG/M2 | OXYGEN SATURATION: 96 % | DIASTOLIC BLOOD PRESSURE: 60 MMHG | SYSTOLIC BLOOD PRESSURE: 112 MMHG | HEART RATE: 76 BPM | TEMPERATURE: 97.7 F

## 2023-07-06 DIAGNOSIS — C61 PROSTATE CANCER METASTATIC TO BONE (HCC): ICD-10-CM

## 2023-07-06 DIAGNOSIS — M54.6 CHRONIC THORACIC BACK PAIN, UNSPECIFIED BACK PAIN LATERALITY: ICD-10-CM

## 2023-07-06 DIAGNOSIS — K21.9 GASTROESOPHAGEAL REFLUX DISEASE, UNSPECIFIED WHETHER ESOPHAGITIS PRESENT: Primary | ICD-10-CM

## 2023-07-06 DIAGNOSIS — C79.51 PROSTATE CANCER METASTATIC TO BONE (HCC): ICD-10-CM

## 2023-07-06 DIAGNOSIS — G89.29 CHRONIC THORACIC BACK PAIN, UNSPECIFIED BACK PAIN LATERALITY: ICD-10-CM

## 2023-07-06 DIAGNOSIS — Z96.0 S/P URETERAL STENT PLACEMENT: ICD-10-CM

## 2023-07-06 PROCEDURE — 99213 OFFICE O/P EST LOW 20 MIN: CPT | Performed by: INTERNAL MEDICINE

## 2023-07-06 PROCEDURE — 1123F ACP DISCUSS/DSCN MKR DOCD: CPT | Performed by: INTERNAL MEDICINE

## 2023-07-06 RX ORDER — PANTOPRAZOLE SODIUM 40 MG/1
40 TABLET, DELAYED RELEASE ORAL DAILY
Qty: 90 TABLET | Refills: 1 | Status: SHIPPED | OUTPATIENT
Start: 2023-07-06

## 2023-07-06 ASSESSMENT — ENCOUNTER SYMPTOMS
EYE DISCHARGE: 0
NAUSEA: 0
SHORTNESS OF BREATH: 0
BLOOD IN STOOL: 0
ABDOMINAL PAIN: 0

## 2023-07-06 NOTE — PROGRESS NOTES
Pernicious anemia 08/04/2021    Hyperlipidemia 08/04/2021    Hemorrhoid thrombosis 11/05/2016        Diagnosis:   1. Gastroesophageal reflux disease, unspecified whether esophagitis present  -     pantoprazole (PROTONIX) 40 MG tablet; Take 1 tablet by mouth daily, Disp-90 tablet, R-1Normal  2. Prostate cancer metastatic to bone ([address])  3. S/P ureteral stent placement  4. Chronic thoracic back pain, unspecified back pain laterality         PLAN:     Rf - sent for Protonix    Pt is stable on current medical treatment. Continue current treatment plan    Side effects/Adverse effects/Precautions are reviewed with patient. Low salt, Low Carb diet an low fat diet  Continue medications as advised and take them regularly  Regular exercises as advised    Discussed natural and expected course of this diagnosis and need to alert me if symptoms do not follow expected course, or if any worse. Smoking cessation if applicable, discussed with patient. Patient Instructions   The medication list included in this document is our record of what you are currently taking, including any changes that were made at today's visit. If you find any differences when compared to your medications at home, or have any questions that were not answered at your visit, please contact the office.         Return in about 3 months (around 10/6/2023) for Annual Medicare Wellness Exam.

## 2023-11-14 DIAGNOSIS — K21.9 GASTROESOPHAGEAL REFLUX DISEASE, UNSPECIFIED WHETHER ESOPHAGITIS PRESENT: ICD-10-CM

## 2023-11-14 RX ORDER — PANTOPRAZOLE SODIUM 40 MG/1
40 TABLET, DELAYED RELEASE ORAL DAILY
Qty: 90 TABLET | Refills: 3 | OUTPATIENT
Start: 2023-11-14

## 2023-11-16 ENCOUNTER — OFFICE VISIT (OUTPATIENT)
Dept: FAMILY MEDICINE CLINIC | Age: 78
End: 2023-11-16
Payer: MEDICARE

## 2023-11-16 VITALS
BODY MASS INDEX: 30.77 KG/M2 | SYSTOLIC BLOOD PRESSURE: 118 MMHG | OXYGEN SATURATION: 97 % | TEMPERATURE: 98 F | WEIGHT: 214.9 LBS | HEIGHT: 70 IN | DIASTOLIC BLOOD PRESSURE: 66 MMHG | HEART RATE: 84 BPM

## 2023-11-16 DIAGNOSIS — K21.9 GASTROESOPHAGEAL REFLUX DISEASE, UNSPECIFIED WHETHER ESOPHAGITIS PRESENT: ICD-10-CM

## 2023-11-16 DIAGNOSIS — Z00.00 MEDICARE ANNUAL WELLNESS VISIT, SUBSEQUENT: Primary | ICD-10-CM

## 2023-11-16 PROCEDURE — 1123F ACP DISCUSS/DSCN MKR DOCD: CPT | Performed by: INTERNAL MEDICINE

## 2023-11-16 PROCEDURE — G0439 PPPS, SUBSEQ VISIT: HCPCS | Performed by: INTERNAL MEDICINE

## 2023-11-16 RX ORDER — PANTOPRAZOLE SODIUM 40 MG/1
40 TABLET, DELAYED RELEASE ORAL DAILY
Qty: 90 TABLET | Refills: 1 | Status: SHIPPED | OUTPATIENT
Start: 2023-11-16

## 2023-11-16 ASSESSMENT — PATIENT HEALTH QUESTIONNAIRE - PHQ9
SUM OF ALL RESPONSES TO PHQ QUESTIONS 1-9: 0
1. LITTLE INTEREST OR PLEASURE IN DOING THINGS: 0
SUM OF ALL RESPONSES TO PHQ QUESTIONS 1-9: 0
2. FEELING DOWN, DEPRESSED OR HOPELESS: 0
SUM OF ALL RESPONSES TO PHQ QUESTIONS 1-9: 0
SUM OF ALL RESPONSES TO PHQ9 QUESTIONS 1 & 2: 0
SUM OF ALL RESPONSES TO PHQ QUESTIONS 1-9: 0

## 2023-11-16 NOTE — PATIENT INSTRUCTIONS
The medication list included in this document is our record of what you are currently taking, including any changes that were made at today's visit. If you find any differences when compared to your medications at home, or have any questions that were not answered at your visit, please contact the office. Starting a Weight Loss Plan: Care Instructions  Overview     If you're thinking about losing weight, it can be hard to know where to start. Your doctor can help you set up a weight loss plan that best meets your needs. You may want to take a class on nutrition or exercise, or you could join a weight loss support group. If you have questions about how to make changes to your eating or exercise habits, ask your doctor about seeing a registered dietitian or an exercise specialist.  It can be a big challenge to lose weight. But you don't have to make huge changes at once. Make small changes, and stick with them. When those changes become habit, add a few more changes. If you don't think you're ready to make changes right now, try to pick a date in the future. Make an appointment to see your doctor to discuss whether the time is right for you to start a plan. Follow-up care is a key part of your treatment and safety. Be sure to make and go to all appointments, and call your doctor if you are having problems. It's also a good idea to know your test results and keep a list of the medicines you take. How can you care for yourself at home? Set realistic goals. Many people expect to lose much more weight than is likely. A weight loss of 5% to 10% of your body weight may be enough to improve your health. Get family and friends involved to provide support. Talk to them about why you are trying to lose weight, and ask them to help. They can help by participating in exercise and having meals with you, even if they may be eating something different. Find what works best for you.  If you do not have time or do not like

## 2024-01-01 ENCOUNTER — HOSPITAL ENCOUNTER (INPATIENT)
Age: 79
LOS: 3 days | Discharge: HOSPICE | DRG: 175 | End: 2024-11-27
Attending: EMERGENCY MEDICINE | Admitting: FAMILY MEDICINE
Payer: MEDICARE

## 2024-01-01 ENCOUNTER — APPOINTMENT (OUTPATIENT)
Dept: CT IMAGING | Age: 79
DRG: 175 | End: 2024-01-01
Payer: MEDICARE

## 2024-01-01 VITALS
SYSTOLIC BLOOD PRESSURE: 88 MMHG | HEIGHT: 68 IN | WEIGHT: 184 LBS | TEMPERATURE: 97.9 F | BODY MASS INDEX: 27.89 KG/M2 | OXYGEN SATURATION: 96 % | HEART RATE: 95 BPM | DIASTOLIC BLOOD PRESSURE: 66 MMHG | RESPIRATION RATE: 16 BRPM

## 2024-01-01 DIAGNOSIS — C7A.8 MALIGNANT NEUROENDOCRINE NEOPLASM (HCC): ICD-10-CM

## 2024-01-01 DIAGNOSIS — D64.9 CHRONIC ANEMIA: ICD-10-CM

## 2024-01-01 DIAGNOSIS — I26.99 ACUTE PULMONARY EMBOLISM, UNSPECIFIED PULMONARY EMBOLISM TYPE, UNSPECIFIED WHETHER ACUTE COR PULMONALE PRESENT (HCC): Primary | ICD-10-CM

## 2024-01-01 DIAGNOSIS — J90 PLEURAL EFFUSION: ICD-10-CM

## 2024-01-01 DIAGNOSIS — K52.9 COLITIS: ICD-10-CM

## 2024-01-01 LAB
ABO + RH BLD: NORMAL
ALBUMIN SERPL-MCNC: 3.7 G/DL (ref 3.5–5.2)
ALP SERPL-CCNC: 99 U/L (ref 40–129)
ALT SERPL-CCNC: 11 U/L (ref 0–40)
ANION GAP SERPL CALCULATED.3IONS-SCNC: 12 MMOL/L (ref 7–16)
ANION GAP SERPL CALCULATED.3IONS-SCNC: 9 MMOL/L (ref 7–16)
ARM BAND NUMBER: NORMAL
AST SERPL-CCNC: 28 U/L (ref 0–39)
B-OH-BUTYR SERPL-MCNC: 0.15 MMOL/L (ref 0.02–0.27)
BASOPHILS # BLD: 0 K/UL (ref 0–0.2)
BASOPHILS # BLD: 0 K/UL (ref 0–0.2)
BASOPHILS NFR BLD: 0 % (ref 0–2)
BASOPHILS NFR BLD: 0 % (ref 0–2)
BILIRUB DIRECT SERPL-MCNC: 0.2 MG/DL (ref 0–0.3)
BILIRUB INDIRECT SERPL-MCNC: 0.5 MG/DL (ref 0–1)
BILIRUB SERPL-MCNC: 0.7 MG/DL (ref 0–1.2)
BILIRUB UR QL STRIP: NEGATIVE
BLOOD BANK SAMPLE EXPIRATION: NORMAL
BLOOD GROUP ANTIBODIES SERPL: NEGATIVE
BUN SERPL-MCNC: 28 MG/DL (ref 6–23)
BUN SERPL-MCNC: 32 MG/DL (ref 6–23)
CALCIUM SERPL-MCNC: 8.1 MG/DL (ref 8.6–10.2)
CALCIUM SERPL-MCNC: 8.9 MG/DL (ref 8.6–10.2)
CHLORIDE SERPL-SCNC: 101 MMOL/L (ref 98–107)
CHLORIDE SERPL-SCNC: 105 MMOL/L (ref 98–107)
CLARITY UR: ABNORMAL
CO2 SERPL-SCNC: 18 MMOL/L (ref 22–29)
CO2 SERPL-SCNC: 24 MMOL/L (ref 22–29)
COLOR UR: YELLOW
CREAT SERPL-MCNC: 1.3 MG/DL (ref 0.7–1.2)
CREAT SERPL-MCNC: 1.4 MG/DL (ref 0.7–1.2)
EKG ATRIAL RATE: 90 BPM
EKG P AXIS: 18 DEGREES
EKG P-R INTERVAL: 178 MS
EKG Q-T INTERVAL: 390 MS
EKG QRS DURATION: 104 MS
EKG QTC CALCULATION (BAZETT): 477 MS
EKG R AXIS: -56 DEGREES
EKG T AXIS: 97 DEGREES
EKG VENTRICULAR RATE: 90 BPM
EOSINOPHIL # BLD: 0 K/UL (ref 0.05–0.5)
EOSINOPHIL # BLD: 0 K/UL (ref 0.05–0.5)
EOSINOPHILS RELATIVE PERCENT: 0 % (ref 0–6)
EOSINOPHILS RELATIVE PERCENT: 0 % (ref 0–6)
ERYTHROCYTE [DISTWIDTH] IN BLOOD BY AUTOMATED COUNT: 23.6 % (ref 11.5–15)
ERYTHROCYTE [DISTWIDTH] IN BLOOD BY AUTOMATED COUNT: 24.1 % (ref 11.5–15)
GFR, ESTIMATED: 49 ML/MIN/1.73M2
GFR, ESTIMATED: 56 ML/MIN/1.73M2
GLUCOSE SERPL-MCNC: 116 MG/DL (ref 74–99)
GLUCOSE SERPL-MCNC: 119 MG/DL (ref 74–99)
GLUCOSE UR STRIP-MCNC: NEGATIVE MG/DL
HCT VFR BLD AUTO: 23.4 % (ref 37–54)
HCT VFR BLD AUTO: 24.4 % (ref 37–54)
HGB BLD-MCNC: 7.8 G/DL (ref 12.5–16.5)
HGB BLD-MCNC: 7.9 G/DL (ref 12.5–16.5)
HGB UR QL STRIP.AUTO: ABNORMAL
INR PPP: 1.4
KETONES UR STRIP-MCNC: ABNORMAL MG/DL
LACTATE BLDV-SCNC: 1.4 MMOL/L (ref 0.5–1.9)
LACTATE BLDV-SCNC: 1.6 MMOL/L (ref 0.5–1.9)
LEUKOCYTE ESTERASE UR QL STRIP: ABNORMAL
LIPASE SERPL-CCNC: 16 U/L (ref 13–60)
LYMPHOCYTES NFR BLD: 0.5 K/UL (ref 1.5–4)
LYMPHOCYTES NFR BLD: 0.67 K/UL (ref 1.5–4)
LYMPHOCYTES RELATIVE PERCENT: 14 % (ref 20–42)
LYMPHOCYTES RELATIVE PERCENT: 21 % (ref 20–42)
MAGNESIUM SERPL-MCNC: 1.4 MG/DL (ref 1.6–2.6)
MCH RBC QN AUTO: 33.5 PG (ref 26–35)
MCH RBC QN AUTO: 34.8 PG (ref 26–35)
MCHC RBC AUTO-ENTMCNC: 32.4 G/DL (ref 32–34.5)
MCHC RBC AUTO-ENTMCNC: 33.3 G/DL (ref 32–34.5)
MCV RBC AUTO: 100.4 FL (ref 80–99.9)
MCV RBC AUTO: 107.5 FL (ref 80–99.9)
MICROORGANISM SPEC CULT: NORMAL
MICROORGANISM SPEC CULT: NORMAL
MONOCYTES NFR BLD: 0.29 K/UL (ref 0.1–0.95)
MONOCYTES NFR BLD: 0.45 K/UL (ref 0.1–0.95)
MONOCYTES NFR BLD: 14 % (ref 2–12)
MONOCYTES NFR BLD: 8 % (ref 2–12)
NEUTROPHILS NFR BLD: 65 % (ref 43–80)
NEUTROPHILS NFR BLD: 78 % (ref 43–80)
NEUTS SEG NFR BLD: 2.09 K/UL (ref 1.8–7.3)
NEUTS SEG NFR BLD: 2.81 K/UL (ref 1.8–7.3)
NITRITE UR QL STRIP: NEGATIVE
NUCLEATED RED BLOOD CELLS: 2 PER 100 WBC
NUCLEATED RED BLOOD CELLS: 2 PER 100 WBC
PARTIAL THROMBOPLASTIN TIME: 25.6 SEC (ref 24.5–35.1)
PH UR STRIP: 6.5 [PH] (ref 5–9)
PH VENOUS: 7.36 (ref 7.35–7.45)
PLATELET # BLD AUTO: 86 K/UL (ref 130–450)
PLATELET # BLD AUTO: 89 K/UL (ref 130–450)
PLATELET CONFIRMATION: NORMAL
PLATELET CONFIRMATION: NORMAL
PMV BLD AUTO: 10.7 FL (ref 7–12)
PMV BLD AUTO: 11.1 FL (ref 7–12)
POTASSIUM SERPL-SCNC: 4.1 MMOL/L (ref 3.5–5)
POTASSIUM SERPL-SCNC: 4.5 MMOL/L (ref 3.5–5)
PROT SERPL-MCNC: 7.1 G/DL (ref 6.4–8.3)
PROT UR STRIP-MCNC: >=300 MG/DL
PROTHROMBIN TIME: 14.9 SEC (ref 9.3–12.4)
RBC # BLD AUTO: 2.27 M/UL (ref 3.8–5.8)
RBC # BLD AUTO: 2.33 M/UL (ref 3.8–5.8)
RBC # BLD: ABNORMAL 10*6/UL
RBC # BLD: ABNORMAL 10*6/UL
RBC #/AREA URNS HPF: ABNORMAL /HPF
SERVICE CMNT-IMP: NORMAL
SERVICE CMNT-IMP: NORMAL
SODIUM SERPL-SCNC: 134 MMOL/L (ref 132–146)
SODIUM SERPL-SCNC: 135 MMOL/L (ref 132–146)
SP GR UR STRIP: 1.01 (ref 1–1.03)
SPECIMEN DESCRIPTION: NORMAL
SPECIMEN DESCRIPTION: NORMAL
TROPONIN I SERPL HS-MCNC: 106 NG/L (ref 0–11)
TROPONIN I SERPL HS-MCNC: 124 NG/L (ref 0–11)
UROBILINOGEN UR STRIP-ACNC: 1 EU/DL (ref 0–1)
WBC #/AREA URNS HPF: ABNORMAL /HPF
WBC OTHER # BLD: 3.2 K/UL (ref 4.5–11.5)
WBC OTHER # BLD: 3.6 K/UL (ref 4.5–11.5)

## 2024-01-01 PROCEDURE — 99232 SBSQ HOSP IP/OBS MODERATE 35: CPT | Performed by: FAMILY MEDICINE

## 2024-01-01 PROCEDURE — 2580000003 HC RX 258: Performed by: FAMILY MEDICINE

## 2024-01-01 PROCEDURE — 86900 BLOOD TYPING SEROLOGIC ABO: CPT

## 2024-01-01 PROCEDURE — 6360000002 HC RX W HCPCS: Performed by: EMERGENCY MEDICINE

## 2024-01-01 PROCEDURE — 85610 PROTHROMBIN TIME: CPT

## 2024-01-01 PROCEDURE — 97165 OT EVAL LOW COMPLEX 30 MIN: CPT | Performed by: OCCUPATIONAL THERAPIST

## 2024-01-01 PROCEDURE — 6370000000 HC RX 637 (ALT 250 FOR IP): Performed by: FAMILY MEDICINE

## 2024-01-01 PROCEDURE — 6360000002 HC RX W HCPCS: Performed by: FAMILY MEDICINE

## 2024-01-01 PROCEDURE — 6360000004 HC RX CONTRAST MEDICATION: Performed by: RADIOLOGY

## 2024-01-01 PROCEDURE — 36415 COLL VENOUS BLD VENIPUNCTURE: CPT

## 2024-01-01 PROCEDURE — 86850 RBC ANTIBODY SCREEN: CPT

## 2024-01-01 PROCEDURE — 74177 CT ABD & PELVIS W/CONTRAST: CPT

## 2024-01-01 PROCEDURE — 6370000000 HC RX 637 (ALT 250 FOR IP): Performed by: NURSE PRACTITIONER

## 2024-01-01 PROCEDURE — 85025 COMPLETE CBC W/AUTO DIFF WBC: CPT

## 2024-01-01 PROCEDURE — 80048 BASIC METABOLIC PNL TOTAL CA: CPT

## 2024-01-01 PROCEDURE — 82248 BILIRUBIN DIRECT: CPT

## 2024-01-01 PROCEDURE — 96361 HYDRATE IV INFUSION ADD-ON: CPT

## 2024-01-01 PROCEDURE — 96374 THER/PROPH/DIAG INJ IV PUSH: CPT

## 2024-01-01 PROCEDURE — 82800 BLOOD PH: CPT

## 2024-01-01 PROCEDURE — 96376 TX/PRO/DX INJ SAME DRUG ADON: CPT

## 2024-01-01 PROCEDURE — 6370000000 HC RX 637 (ALT 250 FOR IP): Performed by: INTERNAL MEDICINE

## 2024-01-01 PROCEDURE — 97535 SELF CARE MNGMENT TRAINING: CPT | Performed by: OCCUPATIONAL THERAPIST

## 2024-01-01 PROCEDURE — 6360000002 HC RX W HCPCS: Performed by: NURSE PRACTITIONER

## 2024-01-01 PROCEDURE — 80053 COMPREHEN METABOLIC PANEL: CPT

## 2024-01-01 PROCEDURE — 86901 BLOOD TYPING SEROLOGIC RH(D): CPT

## 2024-01-01 PROCEDURE — 82010 KETONE BODYS QUAN: CPT

## 2024-01-01 PROCEDURE — 93010 ELECTROCARDIOGRAM REPORT: CPT | Performed by: INTERNAL MEDICINE

## 2024-01-01 PROCEDURE — 85730 THROMBOPLASTIN TIME PARTIAL: CPT

## 2024-01-01 PROCEDURE — 97530 THERAPEUTIC ACTIVITIES: CPT | Performed by: PHYSICAL THERAPIST

## 2024-01-01 PROCEDURE — 97110 THERAPEUTIC EXERCISES: CPT

## 2024-01-01 PROCEDURE — 99291 CRITICAL CARE FIRST HOUR: CPT

## 2024-01-01 PROCEDURE — 84484 ASSAY OF TROPONIN QUANT: CPT

## 2024-01-01 PROCEDURE — 2580000003 HC RX 258: Performed by: EMERGENCY MEDICINE

## 2024-01-01 PROCEDURE — 71260 CT THORAX DX C+: CPT

## 2024-01-01 PROCEDURE — 2700000000 HC OXYGEN THERAPY PER DAY

## 2024-01-01 PROCEDURE — 1200000000 HC SEMI PRIVATE

## 2024-01-01 PROCEDURE — 97161 PT EVAL LOW COMPLEX 20 MIN: CPT | Performed by: PHYSICAL THERAPIST

## 2024-01-01 PROCEDURE — 87040 BLOOD CULTURE FOR BACTERIA: CPT

## 2024-01-01 PROCEDURE — 93005 ELECTROCARDIOGRAM TRACING: CPT | Performed by: EMERGENCY MEDICINE

## 2024-01-01 PROCEDURE — 99223 1ST HOSP IP/OBS HIGH 75: CPT | Performed by: FAMILY MEDICINE

## 2024-01-01 PROCEDURE — 99239 HOSP IP/OBS DSCHRG MGMT >30: CPT | Performed by: FAMILY MEDICINE

## 2024-01-01 PROCEDURE — 83690 ASSAY OF LIPASE: CPT

## 2024-01-01 PROCEDURE — 81001 URINALYSIS AUTO W/SCOPE: CPT

## 2024-01-01 PROCEDURE — 83605 ASSAY OF LACTIC ACID: CPT

## 2024-01-01 PROCEDURE — 83735 ASSAY OF MAGNESIUM: CPT

## 2024-01-01 PROCEDURE — 99223 1ST HOSP IP/OBS HIGH 75: CPT | Performed by: NURSE PRACTITIONER

## 2024-01-01 RX ORDER — METRONIDAZOLE 500 MG/100ML
500 INJECTION, SOLUTION INTRAVENOUS EVERY 8 HOURS
Status: DISCONTINUED | OUTPATIENT
Start: 2024-01-01 | End: 2024-01-01 | Stop reason: HOSPADM

## 2024-01-01 RX ORDER — HYDROCODONE BITARTRATE AND ACETAMINOPHEN 5; 325 MG/1; MG/1
1 TABLET ORAL EVERY 4 HOURS PRN
Status: DISCONTINUED | OUTPATIENT
Start: 2024-01-01 | End: 2024-01-01 | Stop reason: HOSPADM

## 2024-01-01 RX ORDER — LIDOCAINE 4 G/G
1 PATCH TOPICAL DAILY
Qty: 30 EACH | Refills: 5 | Status: SHIPPED | OUTPATIENT
Start: 2024-01-01 | End: 2024-12-06

## 2024-01-01 RX ORDER — ONDANSETRON 8 MG/1
8 TABLET, FILM COATED ORAL EVERY 8 HOURS PRN
Qty: 9 TABLET | Refills: 0 | Status: SHIPPED | OUTPATIENT
Start: 2024-01-01 | End: 2024-01-01

## 2024-01-01 RX ORDER — FENTANYL CITRATE 0.05 MG/ML
25 INJECTION, SOLUTION INTRAMUSCULAR; INTRAVENOUS ONCE
Status: COMPLETED | OUTPATIENT
Start: 2024-01-01 | End: 2024-01-01

## 2024-01-01 RX ORDER — FENTANYL CITRATE 0.05 MG/ML
50 INJECTION, SOLUTION INTRAMUSCULAR; INTRAVENOUS
Status: DISCONTINUED | OUTPATIENT
Start: 2024-01-01 | End: 2024-01-01 | Stop reason: HOSPADM

## 2024-01-01 RX ORDER — MORPHINE SULFATE 15 MG/1
15 TABLET, FILM COATED, EXTENDED RELEASE ORAL EVERY 12 HOURS SCHEDULED
Status: DISCONTINUED | OUTPATIENT
Start: 2024-01-01 | End: 2024-01-01 | Stop reason: HOSPADM

## 2024-01-01 RX ORDER — LIDOCAINE 4 G/G
1 PATCH TOPICAL DAILY
Status: DISCONTINUED | OUTPATIENT
Start: 2024-01-01 | End: 2024-01-01 | Stop reason: HOSPADM

## 2024-01-01 RX ORDER — PANTOPRAZOLE SODIUM 40 MG/1
40 TABLET, DELAYED RELEASE ORAL
Status: DISCONTINUED | OUTPATIENT
Start: 2024-01-01 | End: 2024-01-01 | Stop reason: HOSPADM

## 2024-01-01 RX ORDER — SENNOSIDES A AND B 8.6 MG/1
1 TABLET, FILM COATED ORAL 2 TIMES DAILY
Status: DISCONTINUED | OUTPATIENT
Start: 2024-01-01 | End: 2024-01-01 | Stop reason: HOSPADM

## 2024-01-01 RX ORDER — MORPHINE SULFATE 15 MG/1
15 TABLET, FILM COATED, EXTENDED RELEASE ORAL EVERY 12 HOURS SCHEDULED
Qty: 6 TABLET | Refills: 0 | Status: SHIPPED | OUTPATIENT
Start: 2024-01-01 | End: 2024-01-01

## 2024-01-01 RX ORDER — FUROSEMIDE 20 MG/1
20 TABLET ORAL EVERY MORNING
Status: DISCONTINUED | OUTPATIENT
Start: 2024-01-01 | End: 2024-01-01 | Stop reason: HOSPADM

## 2024-01-01 RX ORDER — CIPROFLOXACIN 250 MG/1
250 TABLET, FILM COATED ORAL 2 TIMES DAILY
Qty: 14 TABLET | Refills: 0 | Status: SHIPPED | OUTPATIENT
Start: 2024-01-01 | End: 2024-12-03

## 2024-01-01 RX ORDER — IOPAMIDOL 755 MG/ML
75 INJECTION, SOLUTION INTRAVASCULAR
Status: COMPLETED | OUTPATIENT
Start: 2024-01-01 | End: 2024-01-01

## 2024-01-01 RX ORDER — LORAZEPAM 1 MG/1
1 TABLET ORAL EVERY 4 HOURS PRN
Qty: 18 TABLET | Refills: 0 | Status: SHIPPED | OUTPATIENT
Start: 2024-01-01 | End: 2024-01-01

## 2024-01-01 RX ORDER — 0.9 % SODIUM CHLORIDE 0.9 %
1000 INTRAVENOUS SOLUTION INTRAVENOUS ONCE
Status: COMPLETED | OUTPATIENT
Start: 2024-01-01 | End: 2024-01-01

## 2024-01-01 RX ORDER — SODIUM CHLORIDE 0.9 % (FLUSH) 0.9 %
5-40 SYRINGE (ML) INJECTION PRN
Status: DISCONTINUED | OUTPATIENT
Start: 2024-01-01 | End: 2024-01-01 | Stop reason: HOSPADM

## 2024-01-01 RX ORDER — HYDROCODONE BITARTRATE AND ACETAMINOPHEN 5; 325 MG/1; MG/1
1 TABLET ORAL EVERY 4 HOURS PRN
Qty: 18 TABLET | Refills: 0 | Status: SHIPPED | OUTPATIENT
Start: 2024-01-01 | End: 2024-01-01 | Stop reason: SDUPTHER

## 2024-01-01 RX ORDER — TRAZODONE HYDROCHLORIDE 100 MG/1
100 TABLET ORAL NIGHTLY PRN
Qty: 90 TABLET | Refills: 0 | Status: SHIPPED | OUTPATIENT
Start: 2024-01-01 | End: 2024-12-06

## 2024-01-01 RX ORDER — ACETAMINOPHEN 325 MG/1
650 TABLET ORAL EVERY 6 HOURS PRN
Status: DISCONTINUED | OUTPATIENT
Start: 2024-01-01 | End: 2024-01-01 | Stop reason: HOSPADM

## 2024-01-01 RX ORDER — ONDANSETRON 4 MG/1
8 TABLET, FILM COATED ORAL EVERY 8 HOURS PRN
Status: DISCONTINUED | OUTPATIENT
Start: 2024-01-01 | End: 2024-01-01

## 2024-01-01 RX ORDER — IOPAMIDOL 755 MG/ML
18 INJECTION, SOLUTION INTRAVASCULAR
Status: COMPLETED | OUTPATIENT
Start: 2024-01-01 | End: 2024-01-01

## 2024-01-01 RX ORDER — MORPHINE SULFATE 15 MG/1
15 TABLET, FILM COATED, EXTENDED RELEASE ORAL EVERY 12 HOURS SCHEDULED
Qty: 6 TABLET | Refills: 0 | Status: SHIPPED | OUTPATIENT
Start: 2024-01-01 | End: 2024-01-01 | Stop reason: SDUPTHER

## 2024-01-01 RX ORDER — ACETAMINOPHEN 650 MG/1
650 SUPPOSITORY RECTAL EVERY 6 HOURS PRN
Status: DISCONTINUED | OUTPATIENT
Start: 2024-01-01 | End: 2024-01-01 | Stop reason: HOSPADM

## 2024-01-01 RX ORDER — DIPHENHYDRAMINE HCL 25 MG
50 TABLET ORAL EVERY 6 HOURS PRN
Status: DISCONTINUED | OUTPATIENT
Start: 2024-01-01 | End: 2024-01-01 | Stop reason: HOSPADM

## 2024-01-01 RX ORDER — ONDANSETRON 4 MG/1
8 TABLET, FILM COATED ORAL EVERY 8 HOURS
Status: DISCONTINUED | OUTPATIENT
Start: 2024-01-01 | End: 2024-01-01 | Stop reason: HOSPADM

## 2024-01-01 RX ORDER — TRAZODONE HYDROCHLORIDE 50 MG/1
100 TABLET, FILM COATED ORAL NIGHTLY PRN
Status: DISCONTINUED | OUTPATIENT
Start: 2024-01-01 | End: 2024-01-01 | Stop reason: HOSPADM

## 2024-01-01 RX ORDER — ENOXAPARIN SODIUM 100 MG/ML
1 INJECTION SUBCUTANEOUS 2 TIMES DAILY
Status: DISCONTINUED | OUTPATIENT
Start: 2024-01-01 | End: 2024-01-01 | Stop reason: HOSPADM

## 2024-01-01 RX ORDER — SODIUM CHLORIDE 0.9 % (FLUSH) 0.9 %
5-40 SYRINGE (ML) INJECTION EVERY 12 HOURS SCHEDULED
Status: DISCONTINUED | OUTPATIENT
Start: 2024-01-01 | End: 2024-01-01 | Stop reason: HOSPADM

## 2024-01-01 RX ORDER — FENTANYL CITRATE 0.05 MG/ML
25 INJECTION, SOLUTION INTRAMUSCULAR; INTRAVENOUS
Status: DISCONTINUED | OUTPATIENT
Start: 2024-01-01 | End: 2024-01-01 | Stop reason: HOSPADM

## 2024-01-01 RX ORDER — ONDANSETRON 2 MG/ML
4 INJECTION INTRAMUSCULAR; INTRAVENOUS EVERY 6 HOURS PRN
Status: DISCONTINUED | OUTPATIENT
Start: 2024-01-01 | End: 2024-01-01 | Stop reason: HOSPADM

## 2024-01-01 RX ORDER — METRONIDAZOLE 500 MG/100ML
500 INJECTION, SOLUTION INTRAVENOUS ONCE
Status: COMPLETED | OUTPATIENT
Start: 2024-01-01 | End: 2024-01-01

## 2024-01-01 RX ORDER — ENOXAPARIN SODIUM 100 MG/ML
1 INJECTION SUBCUTANEOUS ONCE
Status: COMPLETED | OUTPATIENT
Start: 2024-01-01 | End: 2024-01-01

## 2024-01-01 RX ORDER — METRONIDAZOLE 500 MG/1
500 TABLET ORAL 2 TIMES DAILY
Qty: 14 TABLET | Refills: 0 | Status: SHIPPED | OUTPATIENT
Start: 2024-01-01 | End: 2024-12-03

## 2024-01-01 RX ORDER — GLYCOPYRROLATE 1 MG/1
2 TABLET ORAL EVERY 8 HOURS PRN
Qty: 18 TABLET | Refills: 0 | Status: SHIPPED | OUTPATIENT
Start: 2024-01-01 | End: 2024-01-01

## 2024-01-01 RX ADMIN — ENOXAPARIN SODIUM 80 MG: 100 INJECTION SUBCUTANEOUS at 23:36

## 2024-01-01 RX ADMIN — METRONIDAZOLE 500 MG: 500 INJECTION, SOLUTION INTRAVENOUS at 23:09

## 2024-01-01 RX ADMIN — ONDANSETRON 4 MG: 2 INJECTION, SOLUTION INTRAMUSCULAR; INTRAVENOUS at 10:05

## 2024-01-01 RX ADMIN — SODIUM CHLORIDE, PRESERVATIVE FREE 10 ML: 5 INJECTION INTRAVENOUS at 09:05

## 2024-01-01 RX ADMIN — MORPHINE SULFATE 15 MG: 15 TABLET, FILM COATED, EXTENDED RELEASE ORAL at 21:05

## 2024-01-01 RX ADMIN — PANTOPRAZOLE SODIUM 40 MG: 40 TABLET, DELAYED RELEASE ORAL at 06:13

## 2024-01-01 RX ADMIN — SODIUM CHLORIDE, PRESERVATIVE FREE 10 ML: 5 INJECTION INTRAVENOUS at 00:14

## 2024-01-01 RX ADMIN — TRAZODONE HYDROCHLORIDE 100 MG: 50 TABLET ORAL at 23:35

## 2024-01-01 RX ADMIN — CEFTRIAXONE SODIUM 2000 MG: 2 INJECTION, POWDER, FOR SOLUTION INTRAMUSCULAR; INTRAVENOUS at 23:08

## 2024-01-01 RX ADMIN — PANTOPRAZOLE SODIUM 40 MG: 40 TABLET, DELAYED RELEASE ORAL at 06:20

## 2024-01-01 RX ADMIN — FENTANYL CITRATE 50 MCG: 0.05 INJECTION, SOLUTION INTRAMUSCULAR; INTRAVENOUS at 01:36

## 2024-01-01 RX ADMIN — ENOXAPARIN SODIUM 80 MG: 100 INJECTION SUBCUTANEOUS at 23:10

## 2024-01-01 RX ADMIN — FENTANYL CITRATE 25 MCG: 0.05 INJECTION, SOLUTION INTRAMUSCULAR; INTRAVENOUS at 16:21

## 2024-01-01 RX ADMIN — HYDROCODONE BITARTRATE AND ACETAMINOPHEN 1 TABLET: 5; 325 TABLET ORAL at 09:03

## 2024-01-01 RX ADMIN — FENTANYL CITRATE 25 MCG: 0.05 INJECTION, SOLUTION INTRAMUSCULAR; INTRAVENOUS at 08:55

## 2024-01-01 RX ADMIN — HYDROCODONE BITARTRATE AND ACETAMINOPHEN 1 TABLET: 5; 325 TABLET ORAL at 13:41

## 2024-01-01 RX ADMIN — HYDROCODONE BITARTRATE AND ACETAMINOPHEN 1 TABLET: 5; 325 TABLET ORAL at 22:56

## 2024-01-01 RX ADMIN — FUROSEMIDE 20 MG: 20 TABLET ORAL at 12:20

## 2024-01-01 RX ADMIN — HYDROCODONE BITARTRATE AND ACETAMINOPHEN 1 TABLET: 5; 325 TABLET ORAL at 12:37

## 2024-01-01 RX ADMIN — FENTANYL CITRATE 25 MCG: 0.05 INJECTION, SOLUTION INTRAMUSCULAR; INTRAVENOUS at 06:35

## 2024-01-01 RX ADMIN — HYDROCODONE BITARTRATE AND ACETAMINOPHEN 1 TABLET: 5; 325 TABLET ORAL at 17:47

## 2024-01-01 RX ADMIN — HYDROCODONE BITARTRATE AND ACETAMINOPHEN 1 TABLET: 5; 325 TABLET ORAL at 03:42

## 2024-01-01 RX ADMIN — SENNOSIDES 8.6 MG: 8.6 TABLET, FILM COATED ORAL at 10:03

## 2024-01-01 RX ADMIN — HYDROCODONE BITARTRATE AND ACETAMINOPHEN 1 TABLET: 5; 325 TABLET ORAL at 01:34

## 2024-01-01 RX ADMIN — SODIUM CHLORIDE, PRESERVATIVE FREE 10 ML: 5 INJECTION INTRAVENOUS at 20:13

## 2024-01-01 RX ADMIN — METRONIDAZOLE 500 MG: 500 INJECTION, SOLUTION INTRAVENOUS at 09:03

## 2024-01-01 RX ADMIN — FENTANYL CITRATE 50 MCG: 0.05 INJECTION, SOLUTION INTRAMUSCULAR; INTRAVENOUS at 00:28

## 2024-01-01 RX ADMIN — METRONIDAZOLE 500 MG: 500 INJECTION, SOLUTION INTRAVENOUS at 23:37

## 2024-01-01 RX ADMIN — FENTANYL CITRATE 50 MCG: 0.05 INJECTION, SOLUTION INTRAMUSCULAR; INTRAVENOUS at 06:21

## 2024-01-01 RX ADMIN — ONDANSETRON HYDROCHLORIDE 8 MG: 4 TABLET, FILM COATED ORAL at 18:26

## 2024-01-01 RX ADMIN — DIPHENHYDRAMINE HYDROCHLORIDE 50 MG: 25 TABLET ORAL at 23:36

## 2024-01-01 RX ADMIN — ENOXAPARIN SODIUM 80 MG: 100 INJECTION SUBCUTANEOUS at 12:20

## 2024-01-01 RX ADMIN — ENOXAPARIN SODIUM 80 MG: 100 INJECTION SUBCUTANEOUS at 11:00

## 2024-01-01 RX ADMIN — WATER 1000 MG: 1 INJECTION INTRAMUSCULAR; INTRAVENOUS; SUBCUTANEOUS at 14:02

## 2024-01-01 RX ADMIN — FENTANYL CITRATE 25 MCG: 0.05 INJECTION, SOLUTION INTRAMUSCULAR; INTRAVENOUS at 21:37

## 2024-01-01 RX ADMIN — WATER 1000 MG: 1 INJECTION INTRAMUSCULAR; INTRAVENOUS; SUBCUTANEOUS at 13:53

## 2024-01-01 RX ADMIN — FENTANYL CITRATE 25 MCG: 50 INJECTION INTRAMUSCULAR; INTRAVENOUS at 18:48

## 2024-01-01 RX ADMIN — ACETAMINOPHEN 650 MG: 325 TABLET ORAL at 03:06

## 2024-01-01 RX ADMIN — METRONIDAZOLE 500 MG: 500 INJECTION, SOLUTION INTRAVENOUS at 09:04

## 2024-01-01 RX ADMIN — SODIUM CHLORIDE 1000 ML: 9 INJECTION, SOLUTION INTRAVENOUS at 23:07

## 2024-01-01 RX ADMIN — METRONIDAZOLE 500 MG: 500 INJECTION, SOLUTION INTRAVENOUS at 10:14

## 2024-01-01 RX ADMIN — IOPAMIDOL 75 ML: 755 INJECTION, SOLUTION INTRAVENOUS at 20:17

## 2024-01-01 RX ADMIN — HYDROCODONE BITARTRATE AND ACETAMINOPHEN 1 TABLET: 5; 325 TABLET ORAL at 06:20

## 2024-01-01 RX ADMIN — ONDANSETRON 4 MG: 2 INJECTION, SOLUTION INTRAMUSCULAR; INTRAVENOUS at 11:52

## 2024-01-01 RX ADMIN — SENNOSIDES 8.6 MG: 8.6 TABLET, FILM COATED ORAL at 08:56

## 2024-01-01 RX ADMIN — METRONIDAZOLE 500 MG: 500 INJECTION, SOLUTION INTRAVENOUS at 16:23

## 2024-01-01 RX ADMIN — ACETAMINOPHEN 650 MG: 325 TABLET ORAL at 08:47

## 2024-01-01 RX ADMIN — SENNOSIDES 8.6 MG: 8.6 TABLET, FILM COATED ORAL at 11:52

## 2024-01-01 RX ADMIN — HYDROCODONE BITARTRATE AND ACETAMINOPHEN 1 TABLET: 5; 325 TABLET ORAL at 13:38

## 2024-01-01 RX ADMIN — SENNOSIDES 8.6 MG: 8.6 TABLET, FILM COATED ORAL at 21:05

## 2024-01-01 RX ADMIN — HYDROCODONE BITARTRATE AND ACETAMINOPHEN 1 TABLET: 5; 325 TABLET ORAL at 18:21

## 2024-01-01 RX ADMIN — METRONIDAZOLE 500 MG: 500 INJECTION, SOLUTION INTRAVENOUS at 16:54

## 2024-01-01 RX ADMIN — ONDANSETRON 4 MG: 2 INJECTION, SOLUTION INTRAMUSCULAR; INTRAVENOUS at 12:20

## 2024-01-01 RX ADMIN — FENTANYL CITRATE 50 MCG: 0.05 INJECTION, SOLUTION INTRAMUSCULAR; INTRAVENOUS at 20:12

## 2024-01-01 RX ADMIN — PANTOPRAZOLE SODIUM 40 MG: 40 TABLET, DELAYED RELEASE ORAL at 16:53

## 2024-01-01 RX ADMIN — TRAZODONE HYDROCHLORIDE 100 MG: 50 TABLET ORAL at 01:44

## 2024-01-01 RX ADMIN — SODIUM CHLORIDE 1000 ML: 9 INJECTION, SOLUTION INTRAVENOUS at 19:22

## 2024-01-01 RX ADMIN — SODIUM CHLORIDE, PRESERVATIVE FREE 10 ML: 5 INJECTION INTRAVENOUS at 10:14

## 2024-01-01 RX ADMIN — METRONIDAZOLE 500 MG: 500 INJECTION, SOLUTION INTRAVENOUS at 00:26

## 2024-01-01 RX ADMIN — IOPAMIDOL 18 ML: 755 INJECTION, SOLUTION INTRAVENOUS at 20:25

## 2024-01-01 RX ADMIN — FUROSEMIDE 20 MG: 20 TABLET ORAL at 10:03

## 2024-01-01 RX ADMIN — SODIUM CHLORIDE, PRESERVATIVE FREE 10 ML: 5 INJECTION INTRAVENOUS at 08:57

## 2024-01-01 RX ADMIN — ONDANSETRON 4 MG: 2 INJECTION, SOLUTION INTRAMUSCULAR; INTRAVENOUS at 01:39

## 2024-01-01 RX ADMIN — ENOXAPARIN SODIUM 80 MG: 100 INJECTION SUBCUTANEOUS at 10:16

## 2024-01-01 RX ADMIN — MORPHINE SULFATE 15 MG: 15 TABLET, FILM COATED, EXTENDED RELEASE ORAL at 12:20

## 2024-01-01 RX ADMIN — SENNOSIDES 8.6 MG: 8.6 TABLET, FILM COATED ORAL at 20:11

## 2024-01-01 RX ADMIN — MORPHINE SULFATE 15 MG: 15 TABLET, FILM COATED, EXTENDED RELEASE ORAL at 10:04

## 2024-01-01 RX ADMIN — ENOXAPARIN SODIUM 80 MG: 100 INJECTION SUBCUTANEOUS at 22:57

## 2024-01-01 RX ADMIN — ONDANSETRON HYDROCHLORIDE 8 MG: 4 TABLET, FILM COATED ORAL at 01:34

## 2024-01-01 RX ADMIN — SODIUM CHLORIDE, PRESERVATIVE FREE 10 ML: 5 INJECTION INTRAVENOUS at 21:08

## 2024-01-01 RX ADMIN — SODIUM CHLORIDE 1000 ML: 9 INJECTION, SOLUTION INTRAVENOUS at 19:23

## 2024-01-01 RX ADMIN — SALINE NASAL SPRAY 1 SPRAY: 1.5 SOLUTION NASAL at 10:08

## 2024-01-01 ASSESSMENT — PAIN DESCRIPTION - LOCATION
LOCATION: BACK
LOCATION: ABDOMEN;BACK
LOCATION: BACK
LOCATION: ABDOMEN;BACK
LOCATION: ABDOMEN;BACK
LOCATION: BACK
LOCATION: ABDOMEN;CHEST
LOCATION: ABDOMEN;BACK
LOCATION: BACK
LOCATION: ABDOMEN;BACK
LOCATION: BACK;RIB CAGE
LOCATION: BACK;HIP
LOCATION: BACK

## 2024-01-01 ASSESSMENT — PAIN DESCRIPTION - ORIENTATION
ORIENTATION: LOWER
ORIENTATION: LOWER
ORIENTATION: RIGHT;LEFT
ORIENTATION: LOWER
ORIENTATION: LEFT;RIGHT
ORIENTATION: LOWER;RIGHT;LEFT
ORIENTATION: RIGHT;LEFT

## 2024-01-01 ASSESSMENT — PAIN DESCRIPTION - PAIN TYPE: TYPE: ACUTE PAIN;CHRONIC PAIN

## 2024-01-01 ASSESSMENT — PAIN SCALES - GENERAL
PAINLEVEL_OUTOF10: 6
PAINLEVEL_OUTOF10: 2
PAINLEVEL_OUTOF10: 10
PAINLEVEL_OUTOF10: 9
PAINLEVEL_OUTOF10: 0
PAINLEVEL_OUTOF10: 7
PAINLEVEL_OUTOF10: 7
PAINLEVEL_OUTOF10: 5
PAINLEVEL_OUTOF10: 5
PAINLEVEL_OUTOF10: 10
PAINLEVEL_OUTOF10: 7
PAINLEVEL_OUTOF10: 10
PAINLEVEL_OUTOF10: 3
PAINLEVEL_OUTOF10: 10
PAINLEVEL_OUTOF10: 3
PAINLEVEL_OUTOF10: 7
PAINLEVEL_OUTOF10: 3
PAINLEVEL_OUTOF10: 8
PAINLEVEL_OUTOF10: 9
PAINLEVEL_OUTOF10: 7
PAINLEVEL_OUTOF10: 7
PAINLEVEL_OUTOF10: 9
PAINLEVEL_OUTOF10: 7
PAINLEVEL_OUTOF10: 10
PAINLEVEL_OUTOF10: 6
PAINLEVEL_OUTOF10: 8
PAINLEVEL_OUTOF10: 10
PAINLEVEL_OUTOF10: 2
PAINLEVEL_OUTOF10: 4
PAINLEVEL_OUTOF10: 8
PAINLEVEL_OUTOF10: 10
PAINLEVEL_OUTOF10: 8

## 2024-01-01 ASSESSMENT — PAIN DESCRIPTION - DESCRIPTORS
DESCRIPTORS: ACHING;DISCOMFORT
DESCRIPTORS: ACHING;DISCOMFORT;DULL;SORE
DESCRIPTORS: ACHING;DISCOMFORT;DULL;SORE
DESCRIPTORS: ACHING;DULL;JABBING
DESCRIPTORS: ACHING;DULL;NAGGING
DESCRIPTORS: ACHING;DISCOMFORT;DULL;SORE
DESCRIPTORS: ACHING;JABBING;POUNDING
DESCRIPTORS: ACHING;JABBING;NAGGING
DESCRIPTORS: ACHING;THROBBING;SORE
DESCRIPTORS: POUNDING;THROBBING;SHOOTING
DESCRIPTORS: ACHING;TENDER;THROBBING
DESCRIPTORS: DISCOMFORT;ACHING;DULL

## 2024-01-01 ASSESSMENT — PAIN - FUNCTIONAL ASSESSMENT
PAIN_FUNCTIONAL_ASSESSMENT: 0-10
PAIN_FUNCTIONAL_ASSESSMENT: PREVENTS OR INTERFERES SOME ACTIVE ACTIVITIES AND ADLS
PAIN_FUNCTIONAL_ASSESSMENT: PREVENTS OR INTERFERES SOME ACTIVE ACTIVITIES AND ADLS

## 2024-02-19 ENCOUNTER — OFFICE VISIT (OUTPATIENT)
Dept: FAMILY MEDICINE CLINIC | Age: 79
End: 2024-02-19
Payer: MEDICARE

## 2024-02-19 VITALS
WEIGHT: 217.3 LBS | SYSTOLIC BLOOD PRESSURE: 114 MMHG | BODY MASS INDEX: 31.11 KG/M2 | DIASTOLIC BLOOD PRESSURE: 62 MMHG | HEART RATE: 83 BPM | HEIGHT: 70 IN | OXYGEN SATURATION: 96 % | TEMPERATURE: 97.6 F

## 2024-02-19 DIAGNOSIS — C79.51 PROSTATE CANCER METASTATIC TO BONE (HCC): ICD-10-CM

## 2024-02-19 DIAGNOSIS — C61 PROSTATE CANCER METASTATIC TO BONE (HCC): ICD-10-CM

## 2024-02-19 DIAGNOSIS — K21.9 GASTROESOPHAGEAL REFLUX DISEASE, UNSPECIFIED WHETHER ESOPHAGITIS PRESENT: ICD-10-CM

## 2024-02-19 DIAGNOSIS — T63.441A ALLERGIC REACTION TO BEE STING: Primary | ICD-10-CM

## 2024-02-19 DIAGNOSIS — E78.5 DYSLIPIDEMIA: ICD-10-CM

## 2024-02-19 DIAGNOSIS — D69.6 THROMBOCYTOPENIA, UNSPECIFIED (HCC): ICD-10-CM

## 2024-02-19 PROCEDURE — 1123F ACP DISCUSS/DSCN MKR DOCD: CPT | Performed by: INTERNAL MEDICINE

## 2024-02-19 PROCEDURE — 99214 OFFICE O/P EST MOD 30 MIN: CPT | Performed by: INTERNAL MEDICINE

## 2024-02-19 RX ORDER — EPINEPHRINE 0.3 MG/.3ML
0.3 INJECTION SUBCUTANEOUS ONCE
Qty: 0.3 ML | Refills: 0 | Status: SHIPPED | OUTPATIENT
Start: 2024-02-19 | End: 2024-02-19

## 2024-02-19 SDOH — ECONOMIC STABILITY: INCOME INSECURITY: HOW HARD IS IT FOR YOU TO PAY FOR THE VERY BASICS LIKE FOOD, HOUSING, MEDICAL CARE, AND HEATING?: NOT HARD AT ALL

## 2024-02-19 SDOH — ECONOMIC STABILITY: FOOD INSECURITY: WITHIN THE PAST 12 MONTHS, YOU WORRIED THAT YOUR FOOD WOULD RUN OUT BEFORE YOU GOT MONEY TO BUY MORE.: NEVER TRUE

## 2024-02-19 SDOH — ECONOMIC STABILITY: FOOD INSECURITY: WITHIN THE PAST 12 MONTHS, THE FOOD YOU BOUGHT JUST DIDN'T LAST AND YOU DIDN'T HAVE MONEY TO GET MORE.: NEVER TRUE

## 2024-02-19 ASSESSMENT — ENCOUNTER SYMPTOMS
EYE PAIN: 0
SORE THROAT: 0
BLOOD IN STOOL: 0
NAUSEA: 0
ABDOMINAL PAIN: 0
EYE DISCHARGE: 0
SINUS PAIN: 0
SHORTNESS OF BREATH: 0

## 2024-02-19 ASSESSMENT — PATIENT HEALTH QUESTIONNAIRE - PHQ9
1. LITTLE INTEREST OR PLEASURE IN DOING THINGS: 0
2. FEELING DOWN, DEPRESSED OR HOPELESS: 0
SUM OF ALL RESPONSES TO PHQ9 QUESTIONS 1 & 2: 0
SUM OF ALL RESPONSES TO PHQ QUESTIONS 1-9: 0

## 2024-02-19 NOTE — PROGRESS NOTES
Chief Complaint   Patient presents with    Gastroesophageal Reflux     Pt here for follow up on hyperlipidemia, pt reports feeling good. No new issues or concerns     Health Maintenance     AWV - scheduled for 11/21/2024 - pt wants to wait until then refused today        HPI:  Patient is here for follow-up     Feeling okay    Following with Dr Tillman        Allergy and Medications are reviewed and updated.  Past Medical History, Surgical History, and Family History has been reviewed and updated.    Review of Systems:  Review of Systems   Constitutional:  Negative for chills and fever.   HENT:  Negative for congestion, sinus pain and sore throat.    Eyes:  Negative for pain and discharge.   Respiratory:  Negative for shortness of breath (No new SOb).    Cardiovascular:  Negative for chest pain.   Gastrointestinal:  Negative for abdominal pain, blood in stool and nausea.   Genitourinary:  Negative for flank pain and frequency.   Musculoskeletal:  Negative for neck pain.   Hematological:  Does not bruise/bleed easily.   Psychiatric/Behavioral:  Negative for suicidal ideas.          Vitals:    02/19/24 1635   BP: 114/62   Position: Sitting   Pulse: 83   Temp: 97.6 °F (36.4 °C)   TempSrc: Temporal   SpO2: 96%   Weight: 98.6 kg (217 lb 4.8 oz)   Height: 1.778 m (5' 10\")       Physical Exam  Vitals reviewed.   Constitutional:       Appearance: He is well-developed.   HENT:      Head: Normocephalic and atraumatic.   Eyes:      Conjunctiva/sclera: Conjunctivae normal.      Pupils: Pupils are equal, round, and reactive to light.   Neck:      Vascular: No JVD.   Cardiovascular:      Rate and Rhythm: Normal rate and regular rhythm.   Pulmonary:      Effort: Pulmonary effort is normal.      Breath sounds: Normal breath sounds.   Abdominal:      General: Bowel sounds are normal.      Palpations: Abdomen is soft.   Musculoskeletal:         General: Normal range of motion.   Skin:     General: Skin is warm and dry.   Neurological:

## 2024-04-21 DIAGNOSIS — K21.9 GASTROESOPHAGEAL REFLUX DISEASE, UNSPECIFIED WHETHER ESOPHAGITIS PRESENT: ICD-10-CM

## 2024-04-22 RX ORDER — PANTOPRAZOLE SODIUM 40 MG/1
40 TABLET, DELAYED RELEASE ORAL DAILY
Qty: 90 TABLET | Refills: 3 | OUTPATIENT
Start: 2024-04-22

## 2024-04-23 DIAGNOSIS — K21.9 GASTROESOPHAGEAL REFLUX DISEASE, UNSPECIFIED WHETHER ESOPHAGITIS PRESENT: ICD-10-CM

## 2024-04-23 DIAGNOSIS — D69.6 THROMBOCYTOPENIA, UNSPECIFIED (HCC): ICD-10-CM

## 2024-04-23 DIAGNOSIS — E78.5 DYSLIPIDEMIA: ICD-10-CM

## 2024-04-23 LAB
ALBUMIN: 4.2 G/DL (ref 3.5–5.2)
ALP BLD-CCNC: 57 U/L (ref 40–129)
ALT SERPL-CCNC: 11 U/L (ref 0–40)
ANION GAP SERPL CALCULATED.3IONS-SCNC: 13 MMOL/L (ref 7–16)
AST SERPL-CCNC: 20 U/L (ref 0–39)
BASOPHILS ABSOLUTE: 0.03 K/UL (ref 0–0.2)
BASOPHILS RELATIVE PERCENT: 1 % (ref 0–2)
BILIRUB SERPL-MCNC: 0.7 MG/DL (ref 0–1.2)
BUN BLDV-MCNC: 19 MG/DL (ref 6–23)
CALCIUM SERPL-MCNC: 10 MG/DL (ref 8.6–10.2)
CHLORIDE BLD-SCNC: 107 MMOL/L (ref 98–107)
CHOLESTEROL, FASTING: 172 MG/DL
CO2: 21 MMOL/L (ref 22–29)
CREAT SERPL-MCNC: 1.2 MG/DL (ref 0.7–1.2)
EOSINOPHILS ABSOLUTE: 0.13 K/UL (ref 0.05–0.5)
EOSINOPHILS RELATIVE PERCENT: 3 % (ref 0–6)
GFR SERPL CREATININE-BSD FRML MDRD: 59 ML/MIN/1.73M2
GLUCOSE FASTING: 96 MG/DL (ref 74–99)
HCT VFR BLD CALC: 30.9 % (ref 37–54)
HDLC SERPL-MCNC: 45 MG/DL
HEMOGLOBIN: 10.5 G/DL (ref 12.5–16.5)
IMMATURE GRANULOCYTES %: 1 % (ref 0–5)
IMMATURE GRANULOCYTES ABSOLUTE: <0.03 K/UL (ref 0–0.58)
LDL CHOLESTEROL: 109 MG/DL
LYMPHOCYTES ABSOLUTE: 0.9 K/UL (ref 1.5–4)
LYMPHOCYTES RELATIVE PERCENT: 23 % (ref 20–42)
MCH RBC QN AUTO: 36.1 PG (ref 26–35)
MCHC RBC AUTO-ENTMCNC: 34 G/DL (ref 32–34.5)
MCV RBC AUTO: 106.2 FL (ref 80–99.9)
MONOCYTES ABSOLUTE: 0.4 K/UL (ref 0.1–0.95)
MONOCYTES RELATIVE PERCENT: 10 % (ref 2–12)
NEUTROPHILS ABSOLUTE: 2.4 K/UL (ref 1.8–7.3)
NEUTROPHILS RELATIVE PERCENT: 62 % (ref 43–80)
PDW BLD-RTO: 14.8 % (ref 11.5–15)
PLATELET # BLD: 225 K/UL (ref 130–450)
PMV BLD AUTO: 9.7 FL (ref 7–12)
POTASSIUM SERPL-SCNC: 4.9 MMOL/L (ref 3.5–5)
RBC # BLD: 2.91 M/UL (ref 3.8–5.8)
SODIUM BLD-SCNC: 141 MMOL/L (ref 132–146)
TOTAL PROTEIN: 7.3 G/DL (ref 6.4–8.3)
TRIGLYCERIDE, FASTING: 92 MG/DL
TSH SERPL DL<=0.05 MIU/L-ACNC: 1.58 UIU/ML (ref 0.27–4.2)
VLDLC SERPL CALC-MCNC: 18 MG/DL
WBC # BLD: 3.9 K/UL (ref 4.5–11.5)

## 2024-05-20 ENCOUNTER — OFFICE VISIT (OUTPATIENT)
Dept: FAMILY MEDICINE CLINIC | Age: 79
End: 2024-05-20
Payer: MEDICARE

## 2024-05-20 VITALS
SYSTOLIC BLOOD PRESSURE: 116 MMHG | DIASTOLIC BLOOD PRESSURE: 68 MMHG | TEMPERATURE: 98.6 F | OXYGEN SATURATION: 98 % | WEIGHT: 211.2 LBS | HEIGHT: 70 IN | BODY MASS INDEX: 30.24 KG/M2 | HEART RATE: 83 BPM

## 2024-05-20 DIAGNOSIS — D69.6 THROMBOCYTOPENIA, UNSPECIFIED (HCC): ICD-10-CM

## 2024-05-20 DIAGNOSIS — K21.9 GASTROESOPHAGEAL REFLUX DISEASE, UNSPECIFIED WHETHER ESOPHAGITIS PRESENT: ICD-10-CM

## 2024-05-20 DIAGNOSIS — E78.5 DYSLIPIDEMIA: ICD-10-CM

## 2024-05-20 DIAGNOSIS — C61 PROSTATE CANCER METASTATIC TO BONE (HCC): ICD-10-CM

## 2024-05-20 DIAGNOSIS — R53.83 OTHER FATIGUE: Primary | ICD-10-CM

## 2024-05-20 DIAGNOSIS — C79.51 PROSTATE CANCER METASTATIC TO BONE (HCC): ICD-10-CM

## 2024-05-20 PROCEDURE — 99214 OFFICE O/P EST MOD 30 MIN: CPT | Performed by: INTERNAL MEDICINE

## 2024-05-20 PROCEDURE — 1123F ACP DISCUSS/DSCN MKR DOCD: CPT | Performed by: INTERNAL MEDICINE

## 2024-05-20 ASSESSMENT — ENCOUNTER SYMPTOMS
EYE PAIN: 0
NAUSEA: 0
BLOOD IN STOOL: 0
EYE DISCHARGE: 0
SINUS PAIN: 0
SORE THROAT: 0
ABDOMINAL PAIN: 0
SHORTNESS OF BREATH: 0

## 2024-05-20 NOTE — PROGRESS NOTES
Chief Complaint   Patient presents with    Hyperlipidemia     Pt here for follow up on Hyperlipidemia, pt reports feeling ok.       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  Past Medical History, Surgical History, and Family History has been reviewed and updated.    Review of Systems:  Review of Systems   Constitutional:  Positive for fatigue. Negative for chills and fever.   HENT:  Negative for congestion, sinus pain and sore throat.    Eyes:  Negative for pain and discharge.   Respiratory:  Negative for shortness of breath (No new SOb).    Cardiovascular:  Negative for chest pain.   Gastrointestinal:  Negative for abdominal pain, blood in stool and nausea.   Genitourinary:  Negative for flank pain and frequency.   Musculoskeletal:  Negative for neck pain.   Hematological:  Does not bruise/bleed easily.   Psychiatric/Behavioral:  Negative for suicidal ideas.          Vitals:    05/20/24 1618   BP: 116/68   Position: Sitting   Pulse: 83   Temp: 98.6 °F (37 °C)   TempSrc: Temporal   SpO2: 98%   Weight: 95.8 kg (211 lb 3.2 oz)   Height: 1.778 m (5' 10\")       Physical Exam  Vitals reviewed.   Constitutional:       Appearance: He is well-developed.   HENT:      Head: Normocephalic and atraumatic.   Eyes:      Conjunctiva/sclera: Conjunctivae normal.      Pupils: Pupils are equal, round, and reactive to light.   Neck:      Vascular: No JVD.   Cardiovascular:      Rate and Rhythm: Normal rate and regular rhythm.   Pulmonary:      Effort: Pulmonary effort is normal.      Breath sounds: Normal breath sounds.   Abdominal:      General: Bowel sounds are normal.      Palpations: Abdomen is soft.   Musculoskeletal:         General: Normal range of motion.   Skin:     General:

## 2024-06-09 ENCOUNTER — HOSPITAL ENCOUNTER (EMERGENCY)
Age: 79
Discharge: HOME | End: 2024-06-09
Payer: MEDICARE

## 2024-06-09 ENCOUNTER — APPOINTMENT (OUTPATIENT)
Dept: CT IMAGING | Age: 79
End: 2024-06-09
Payer: MEDICARE

## 2024-06-09 VITALS
OXYGEN SATURATION: 97 % | DIASTOLIC BLOOD PRESSURE: 85 MMHG | TEMPERATURE: 98 F | RESPIRATION RATE: 20 BRPM | WEIGHT: 202 LBS | BODY MASS INDEX: 28.98 KG/M2 | SYSTOLIC BLOOD PRESSURE: 143 MMHG | HEART RATE: 70 BPM

## 2024-06-09 DIAGNOSIS — E86.0 DEHYDRATION: ICD-10-CM

## 2024-06-09 DIAGNOSIS — M54.50 ACUTE LOW BACK PAIN WITHOUT SCIATICA, UNSPECIFIED BACK PAIN LATERALITY: Primary | ICD-10-CM

## 2024-06-09 LAB
ALBUMIN SERPL-MCNC: 4.5 G/DL (ref 3.5–5.2)
ALP SERPL-CCNC: 68 U/L (ref 40–129)
ALT SERPL-CCNC: 12 U/L (ref 0–40)
ANION GAP SERPL CALCULATED.3IONS-SCNC: 14 MMOL/L (ref 7–16)
AST SERPL-CCNC: 21 U/L (ref 0–39)
BASOPHILS # BLD: 0.01 K/UL (ref 0–0.2)
BASOPHILS NFR BLD: 0 % (ref 0–2)
BILIRUB SERPL-MCNC: 0.8 MG/DL (ref 0–1.2)
BUN SERPL-MCNC: 29 MG/DL (ref 6–23)
CALCIUM SERPL-MCNC: 9.4 MG/DL (ref 8.6–10.2)
CHLORIDE SERPL-SCNC: 102 MMOL/L (ref 98–107)
CK SERPL-CCNC: 113 U/L (ref 20–200)
CO2 SERPL-SCNC: 20 MMOL/L (ref 22–29)
CREAT SERPL-MCNC: 1.7 MG/DL (ref 0.7–1.2)
EOSINOPHIL # BLD: 0.04 K/UL (ref 0.05–0.5)
EOSINOPHILS RELATIVE PERCENT: 1 % (ref 0–6)
ERYTHROCYTE [DISTWIDTH] IN BLOOD BY AUTOMATED COUNT: 13.8 % (ref 11.5–15)
GFR, ESTIMATED: 41 ML/MIN/1.73M2
GLUCOSE SERPL-MCNC: 109 MG/DL (ref 74–99)
HCT VFR BLD AUTO: 31.5 % (ref 37–54)
HGB BLD-MCNC: 11.3 G/DL (ref 12.5–16.5)
IMM GRANULOCYTES # BLD AUTO: 0.03 K/UL (ref 0–0.58)
IMM GRANULOCYTES NFR BLD: 0 % (ref 0–5)
LYMPHOCYTES NFR BLD: 0.67 K/UL (ref 1.5–4)
LYMPHOCYTES RELATIVE PERCENT: 10 % (ref 20–42)
MCH RBC QN AUTO: 35.6 PG (ref 26–35)
MCHC RBC AUTO-ENTMCNC: 35.9 G/DL (ref 32–34.5)
MCV RBC AUTO: 99.4 FL (ref 80–99.9)
MONOCYTES NFR BLD: 0.37 K/UL (ref 0.1–0.95)
MONOCYTES NFR BLD: 5 % (ref 2–12)
NEUTROPHILS NFR BLD: 84 % (ref 43–80)
NEUTS SEG NFR BLD: 5.82 K/UL (ref 1.8–7.3)
PLATELET # BLD AUTO: 210 K/UL (ref 130–450)
PMV BLD AUTO: 9.2 FL (ref 7–12)
POTASSIUM SERPL-SCNC: 4 MMOL/L (ref 3.5–5)
PROT SERPL-MCNC: 7.9 G/DL (ref 6.4–8.3)
RBC # BLD AUTO: 3.17 M/UL (ref 3.8–5.8)
SODIUM SERPL-SCNC: 136 MMOL/L (ref 132–146)
TROPONIN I SERPL HS-MCNC: 80 NG/L (ref 0–11)
TROPONIN I SERPL HS-MCNC: 85 NG/L (ref 0–11)
WBC OTHER # BLD: 6.9 K/UL (ref 4.5–11.5)

## 2024-06-09 PROCEDURE — 85025 COMPLETE CBC W/AUTO DIFF WBC: CPT

## 2024-06-09 PROCEDURE — 99285 EMERGENCY DEPT VISIT HI MDM: CPT

## 2024-06-09 PROCEDURE — 96360 HYDRATION IV INFUSION INIT: CPT

## 2024-06-09 PROCEDURE — 84484 ASSAY OF TROPONIN QUANT: CPT

## 2024-06-09 PROCEDURE — 93005 ELECTROCARDIOGRAM TRACING: CPT | Performed by: PHYSICIAN ASSISTANT

## 2024-06-09 PROCEDURE — 74174 CTA ABD&PLVS W/CONTRAST: CPT

## 2024-06-09 PROCEDURE — 6360000004 HC RX CONTRAST MEDICATION: Performed by: RADIOLOGY

## 2024-06-09 PROCEDURE — 96361 HYDRATE IV INFUSION ADD-ON: CPT

## 2024-06-09 PROCEDURE — 82550 ASSAY OF CK (CPK): CPT

## 2024-06-09 PROCEDURE — 71275 CT ANGIOGRAPHY CHEST: CPT

## 2024-06-09 PROCEDURE — 2580000003 HC RX 258: Performed by: PHYSICIAN ASSISTANT

## 2024-06-09 PROCEDURE — 80053 COMPREHEN METABOLIC PANEL: CPT

## 2024-06-09 RX ORDER — 0.9 % SODIUM CHLORIDE 0.9 %
1000 INTRAVENOUS SOLUTION INTRAVENOUS ONCE
Status: COMPLETED | OUTPATIENT
Start: 2024-06-09 | End: 2024-06-09

## 2024-06-09 RX ADMIN — IOPAMIDOL 75 ML: 755 INJECTION, SOLUTION INTRAVENOUS at 22:24

## 2024-06-09 RX ADMIN — SODIUM CHLORIDE 1000 ML: 9 INJECTION, SOLUTION INTRAVENOUS at 19:41

## 2024-06-09 ASSESSMENT — LIFESTYLE VARIABLES: HOW OFTEN DO YOU HAVE A DRINK CONTAINING ALCOHOL: NEVER

## 2024-06-09 NOTE — ED PROVIDER NOTES
Independent MAAME Visit.        HPI:  6/9/24, Time: 7:30 PM EDT         Ace Hanna is a 78 y.o. male presenting to the ED for sudden onset back pain while working outside in his yard, beginning several hours ago.  The complaint has been persistent but significantly improved from the start, moderate in severity, and worsened by nothing.  Patient denies any particular injury prior to the onset of symptoms.  States that when he had the sudden onset of back pain it was his entire thoracic and lumbar spine from his shoulders to his hips.  States that he felt very nauseous, his head felt cloudy and was very tired.  States that he went inside and took an ibuprofen and took a nap for about 15 minutes.  His wife then brought him into the emergency department.  Overall symptoms significantly improved.  Afebrile without recent travel or sick contacts.  Patient denies all other symptoms at this time.    Review of Systems:   A complete review of systems was performed and pertinent positives and negatives are stated within HPI, all other systems reviewed and are negative.          --------------------------------------------- PAST HISTORY ---------------------------------------------  Past Medical History:  has a past medical history of Acid reflux, Gómez esophagus, BPH (benign prostatic hyperplasia), Cancer (HCC), H/O cardiovascular stress test, [place] (hard of hearing), and Pernicious anemia.    Past Surgical History:  has a past surgical history that includes Carpal tunnel release (Right); Appendectomy; knee surgery (Right); Tonsillectomy; Colonoscopy (11/02/2016); US BIOPSY LYMPH NODE (12/30/2021); Catheter Insertion (N/A, 2/22/2022); Bladder surgery (Left, 3/16/2023); and US BIOPSY LYMPH NODE (3/17/2023).    Social History:  reports that he quit smoking about 37 years ago. His smoking use included cigarettes. He started smoking about 42 years ago. He has a 5.0 pack-year smoking history. He has never used smokeless

## 2024-06-11 LAB
EKG ATRIAL RATE: 72 BPM
EKG P AXIS: 65 DEGREES
EKG P-R INTERVAL: 190 MS
EKG Q-T INTERVAL: 440 MS
EKG QRS DURATION: 112 MS
EKG QTC CALCULATION (BAZETT): 481 MS
EKG R AXIS: -59 DEGREES
EKG T AXIS: 65 DEGREES
EKG VENTRICULAR RATE: 72 BPM

## 2024-06-11 PROCEDURE — 93010 ELECTROCARDIOGRAM REPORT: CPT | Performed by: INTERNAL MEDICINE

## 2024-06-28 ENCOUNTER — CARE COORDINATION (OUTPATIENT)
Dept: CARE COORDINATION | Age: 79
End: 2024-06-28

## 2024-06-28 NOTE — CARE COORDINATION
[facility] contacted patient to offer Care Coordination. Patient declined at this time. ACM encouraged patient to contact [facility] if he feels he needs assistance managing his health care needs.     PLAN:  Patient will be temporarily excluded from Care Coordination d/t declined enrollment.

## 2024-07-10 DIAGNOSIS — K21.9 GASTROESOPHAGEAL REFLUX DISEASE, UNSPECIFIED WHETHER ESOPHAGITIS PRESENT: ICD-10-CM

## 2024-07-11 RX ORDER — PANTOPRAZOLE SODIUM 40 MG/1
40 TABLET, DELAYED RELEASE ORAL DAILY
Qty: 90 TABLET | Refills: 0 | Status: SHIPPED | OUTPATIENT
Start: 2024-07-11

## 2024-08-23 ENCOUNTER — TRANSCRIBE ORDERS (OUTPATIENT)
Dept: ADMINISTRATIVE | Age: 79
End: 2024-08-23

## 2024-08-23 DIAGNOSIS — I82.432: Primary | ICD-10-CM

## 2024-08-27 ENCOUNTER — HOSPITAL ENCOUNTER (INPATIENT)
Age: 79
LOS: 1 days | Discharge: ANOTHER HOSPITAL | DRG: 292 | End: 2024-08-28
Attending: EMERGENCY MEDICINE | Admitting: INTERNAL MEDICINE
Payer: MEDICARE

## 2024-08-27 ENCOUNTER — APPOINTMENT (OUTPATIENT)
Dept: CT IMAGING | Age: 79
DRG: 292 | End: 2024-08-27
Payer: MEDICARE

## 2024-08-27 DIAGNOSIS — I31.39 PERICARDIAL EFFUSION: ICD-10-CM

## 2024-08-27 DIAGNOSIS — I50.9 ACUTE CONGESTIVE HEART FAILURE, UNSPECIFIED HEART FAILURE TYPE (HCC): ICD-10-CM

## 2024-08-27 DIAGNOSIS — J90 PLEURAL EFFUSION: ICD-10-CM

## 2024-08-27 DIAGNOSIS — R07.9 CHEST PAIN, UNSPECIFIED TYPE: ICD-10-CM

## 2024-08-27 DIAGNOSIS — D64.9 ACUTE ANEMIA: ICD-10-CM

## 2024-08-27 DIAGNOSIS — I50.23 ACUTE ON CHRONIC LEFT SYSTOLIC HEART FAILURE (HCC): Primary | ICD-10-CM

## 2024-08-27 DIAGNOSIS — N17.9 AKI (ACUTE KIDNEY INJURY) (HCC): ICD-10-CM

## 2024-08-27 LAB
ANION GAP SERPL CALCULATED.3IONS-SCNC: 13 MMOL/L (ref 7–16)
BASOPHILS # BLD: 0.04 K/UL (ref 0–0.2)
BASOPHILS NFR BLD: 1 % (ref 0–2)
BNP SERPL-MCNC: 7163 PG/ML (ref 0–450)
BUN SERPL-MCNC: 39 MG/DL (ref 6–23)
CALCIUM SERPL-MCNC: 9.6 MG/DL (ref 8.6–10.2)
CHLORIDE SERPL-SCNC: 105 MMOL/L (ref 98–107)
CO2 SERPL-SCNC: 21 MMOL/L (ref 22–29)
CREAT SERPL-MCNC: 2.2 MG/DL (ref 0.7–1.2)
EKG ATRIAL RATE: 90 BPM
EKG P AXIS: 40 DEGREES
EKG P-R INTERVAL: 190 MS
EKG Q-T INTERVAL: 390 MS
EKG QRS DURATION: 98 MS
EKG QTC CALCULATION (BAZETT): 477 MS
EKG R AXIS: -50 DEGREES
EKG T AXIS: 112 DEGREES
EKG VENTRICULAR RATE: 90 BPM
EOSINOPHIL # BLD: 0.07 K/UL (ref 0.05–0.5)
EOSINOPHILS RELATIVE PERCENT: 1 % (ref 0–6)
ERYTHROCYTE [DISTWIDTH] IN BLOOD BY AUTOMATED COUNT: 18.6 % (ref 11.5–15)
GFR, ESTIMATED: 30 ML/MIN/1.73M2
GLUCOSE SERPL-MCNC: 104 MG/DL (ref 74–99)
HCT VFR BLD AUTO: 27.8 % (ref 37–54)
HGB BLD-MCNC: 8.9 G/DL (ref 12.5–16.5)
IMM GRANULOCYTES # BLD AUTO: 0.03 K/UL (ref 0–0.58)
IMM GRANULOCYTES NFR BLD: 1 % (ref 0–5)
INR PPP: 1.7
LYMPHOCYTES NFR BLD: 0.88 K/UL (ref 1.5–4)
LYMPHOCYTES RELATIVE PERCENT: 15 % (ref 20–42)
MCH RBC QN AUTO: 34.4 PG (ref 26–35)
MCHC RBC AUTO-ENTMCNC: 32 G/DL (ref 32–34.5)
MCV RBC AUTO: 107.3 FL (ref 80–99.9)
MONOCYTES NFR BLD: 0.61 K/UL (ref 0.1–0.95)
MONOCYTES NFR BLD: 10 % (ref 2–12)
NEUTROPHILS NFR BLD: 73 % (ref 43–80)
NEUTS SEG NFR BLD: 4.38 K/UL (ref 1.8–7.3)
PLATELET # BLD AUTO: 229 K/UL (ref 130–450)
PMV BLD AUTO: 10.6 FL (ref 7–12)
POTASSIUM SERPL-SCNC: 4.2 MMOL/L (ref 3.5–5)
PROTHROMBIN TIME: 18.2 SEC (ref 9.3–12.4)
RBC # BLD AUTO: 2.59 M/UL (ref 3.8–5.8)
SARS-COV-2 RDRP RESP QL NAA+PROBE: NOT DETECTED
SODIUM SERPL-SCNC: 139 MMOL/L (ref 132–146)
SPECIMEN DESCRIPTION: NORMAL
TROPONIN I SERPL HS-MCNC: 124 NG/L (ref 0–11)
TROPONIN I SERPL HS-MCNC: 130 NG/L (ref 0–11)
WBC OTHER # BLD: 6 K/UL (ref 4.5–11.5)

## 2024-08-27 PROCEDURE — 99223 1ST HOSP IP/OBS HIGH 75: CPT | Performed by: INTERNAL MEDICINE

## 2024-08-27 PROCEDURE — 96374 THER/PROPH/DIAG INJ IV PUSH: CPT

## 2024-08-27 PROCEDURE — 83880 ASSAY OF NATRIURETIC PEPTIDE: CPT

## 2024-08-27 PROCEDURE — 85025 COMPLETE CBC W/AUTO DIFF WBC: CPT

## 2024-08-27 PROCEDURE — 99285 EMERGENCY DEPT VISIT HI MDM: CPT

## 2024-08-27 PROCEDURE — 2580000003 HC RX 258: Performed by: INTERNAL MEDICINE

## 2024-08-27 PROCEDURE — 6360000004 HC RX CONTRAST MEDICATION: Performed by: RADIOLOGY

## 2024-08-27 PROCEDURE — 80048 BASIC METABOLIC PNL TOTAL CA: CPT

## 2024-08-27 PROCEDURE — 93005 ELECTROCARDIOGRAM TRACING: CPT | Performed by: EMERGENCY MEDICINE

## 2024-08-27 PROCEDURE — 6360000002 HC RX W HCPCS: Performed by: EMERGENCY MEDICINE

## 2024-08-27 PROCEDURE — 85610 PROTHROMBIN TIME: CPT

## 2024-08-27 PROCEDURE — 93010 ELECTROCARDIOGRAM REPORT: CPT | Performed by: INTERNAL MEDICINE

## 2024-08-27 PROCEDURE — 87635 SARS-COV-2 COVID-19 AMP PRB: CPT

## 2024-08-27 PROCEDURE — 6360000002 HC RX W HCPCS: Performed by: INTERNAL MEDICINE

## 2024-08-27 PROCEDURE — 1200000000 HC SEMI PRIVATE

## 2024-08-27 PROCEDURE — 6370000000 HC RX 637 (ALT 250 FOR IP): Performed by: EMERGENCY MEDICINE

## 2024-08-27 PROCEDURE — 84484 ASSAY OF TROPONIN QUANT: CPT

## 2024-08-27 PROCEDURE — 71275 CT ANGIOGRAPHY CHEST: CPT

## 2024-08-27 RX ORDER — SODIUM CHLORIDE 0.9 % (FLUSH) 0.9 %
10 SYRINGE (ML) INJECTION PRN
Status: DISCONTINUED | OUTPATIENT
Start: 2024-08-27 | End: 2024-08-28 | Stop reason: HOSPADM

## 2024-08-27 RX ORDER — SODIUM CHLORIDE 0.9 % (FLUSH) 0.9 %
10 SYRINGE (ML) INJECTION EVERY 12 HOURS SCHEDULED
Status: DISCONTINUED | OUTPATIENT
Start: 2024-08-27 | End: 2024-08-28 | Stop reason: HOSPADM

## 2024-08-27 RX ORDER — ACETAMINOPHEN 650 MG/1
650 SUPPOSITORY RECTAL EVERY 6 HOURS PRN
Status: DISCONTINUED | OUTPATIENT
Start: 2024-08-27 | End: 2024-08-28 | Stop reason: HOSPADM

## 2024-08-27 RX ORDER — ONDANSETRON 2 MG/ML
4 INJECTION INTRAMUSCULAR; INTRAVENOUS EVERY 6 HOURS PRN
Status: DISCONTINUED | OUTPATIENT
Start: 2024-08-27 | End: 2024-08-28 | Stop reason: HOSPADM

## 2024-08-27 RX ORDER — FUROSEMIDE 10 MG/ML
20 INJECTION INTRAMUSCULAR; INTRAVENOUS ONCE
Status: COMPLETED | OUTPATIENT
Start: 2024-08-27 | End: 2024-08-27

## 2024-08-27 RX ORDER — FUROSEMIDE 10 MG/ML
20 INJECTION INTRAMUSCULAR; INTRAVENOUS 2 TIMES DAILY
Status: DISCONTINUED | OUTPATIENT
Start: 2024-08-28 | End: 2024-08-28 | Stop reason: HOSPADM

## 2024-08-27 RX ORDER — IOPAMIDOL 755 MG/ML
75 INJECTION, SOLUTION INTRAVASCULAR
Status: COMPLETED | OUTPATIENT
Start: 2024-08-27 | End: 2024-08-27

## 2024-08-27 RX ORDER — FUROSEMIDE 10 MG/ML
40 INJECTION INTRAMUSCULAR; INTRAVENOUS 2 TIMES DAILY
Status: DISCONTINUED | OUTPATIENT
Start: 2024-08-27 | End: 2024-08-27

## 2024-08-27 RX ORDER — ASPIRIN 81 MG/1
324 TABLET, CHEWABLE ORAL ONCE
Status: COMPLETED | OUTPATIENT
Start: 2024-08-27 | End: 2024-08-27

## 2024-08-27 RX ORDER — PROMETHAZINE HYDROCHLORIDE 25 MG/1
12.5 TABLET ORAL EVERY 6 HOURS PRN
Status: DISCONTINUED | OUTPATIENT
Start: 2024-08-27 | End: 2024-08-28 | Stop reason: HOSPADM

## 2024-08-27 RX ORDER — ENOXAPARIN SODIUM 100 MG/ML
40 INJECTION SUBCUTANEOUS DAILY
Status: DISCONTINUED | OUTPATIENT
Start: 2024-08-27 | End: 2024-08-28 | Stop reason: HOSPADM

## 2024-08-27 RX ORDER — POLYETHYLENE GLYCOL 3350 17 G/17G
17 POWDER, FOR SOLUTION ORAL DAILY PRN
Status: DISCONTINUED | OUTPATIENT
Start: 2024-08-27 | End: 2024-08-28 | Stop reason: HOSPADM

## 2024-08-27 RX ORDER — SODIUM CHLORIDE 9 MG/ML
INJECTION, SOLUTION INTRAVENOUS PRN
Status: DISCONTINUED | OUTPATIENT
Start: 2024-08-27 | End: 2024-08-28 | Stop reason: HOSPADM

## 2024-08-27 RX ORDER — ACETAMINOPHEN 325 MG/1
650 TABLET ORAL EVERY 6 HOURS PRN
Status: DISCONTINUED | OUTPATIENT
Start: 2024-08-27 | End: 2024-08-28 | Stop reason: HOSPADM

## 2024-08-27 RX ADMIN — ENOXAPARIN SODIUM 40 MG: 100 INJECTION SUBCUTANEOUS at 22:17

## 2024-08-27 RX ADMIN — IOPAMIDOL 75 ML: 755 INJECTION, SOLUTION INTRAVENOUS at 16:16

## 2024-08-27 RX ADMIN — ASPIRIN 324 MG: 81 TABLET, CHEWABLE ORAL at 15:10

## 2024-08-27 RX ADMIN — Medication 10 ML: at 21:00

## 2024-08-27 RX ADMIN — FUROSEMIDE 20 MG: 10 INJECTION, SOLUTION INTRAMUSCULAR; INTRAVENOUS at 17:51

## 2024-08-27 ASSESSMENT — PAIN DESCRIPTION - FREQUENCY
FREQUENCY: CONTINUOUS
FREQUENCY: CONTINUOUS

## 2024-08-27 ASSESSMENT — PAIN - FUNCTIONAL ASSESSMENT: PAIN_FUNCTIONAL_ASSESSMENT: 0-10

## 2024-08-27 ASSESSMENT — PAIN DESCRIPTION - DESCRIPTORS
DESCRIPTORS: ACHING
DESCRIPTORS: ACHING

## 2024-08-27 ASSESSMENT — PAIN DESCRIPTION - ORIENTATION
ORIENTATION: MID
ORIENTATION: MID

## 2024-08-27 ASSESSMENT — PAIN DESCRIPTION - LOCATION
LOCATION: STERNUM;CHEST
LOCATION: CHEST;STERNUM

## 2024-08-27 ASSESSMENT — PAIN SCALES - GENERAL
PAINLEVEL_OUTOF10: 7
PAINLEVEL_OUTOF10: 7

## 2024-08-27 ASSESSMENT — PAIN DESCRIPTION - PAIN TYPE
TYPE: ACUTE PAIN
TYPE: ACUTE PAIN

## 2024-08-27 ASSESSMENT — LIFESTYLE VARIABLES
HOW OFTEN DO YOU HAVE A DRINK CONTAINING ALCOHOL: NEVER
HOW OFTEN DO YOU HAVE A DRINK CONTAINING ALCOHOL: NEVER
HOW MANY STANDARD DRINKS CONTAINING ALCOHOL DO YOU HAVE ON A TYPICAL DAY: PATIENT DOES NOT DRINK

## 2024-08-27 NOTE — ED NOTES
Name: Ace Hanna  : 1945  MRN: [id number]    Date:  24    Time: 4:03 PM EDT    Benefits of immediately proceeding with Radiology exam(s) without pre-testing outweigh the risks or are not indicated as specified below and therefore the following is/are being waived:    [] Pregnancy test   [] Patients LMP on-time and regular.   [] Patient had Tubal Ligation or has other Contraception Device.   [] Patient  is Menopausal or Premenarcheal.    [] Patient had Full or Partial Hysterectomy.    [] Protocol for Iodine allergy    [] MRI Questionnaire     [x] BUN/Creatinine   [] Patient age w/no hx of renal dysfunction.   [] Patient on Dialysis.   [] Recent Normal Labs.  Electronically signed by Germaine Gimenez DO on 24 at 4:03 PM EDT               Germaine Gimenez DO  [phone]

## 2024-08-28 ENCOUNTER — APPOINTMENT (OUTPATIENT)
Age: 79
DRG: 292 | End: 2024-08-28
Attending: INTERNAL MEDICINE
Payer: MEDICARE

## 2024-08-28 ENCOUNTER — HOSPITAL ENCOUNTER (INPATIENT)
Age: 79
LOS: 13 days | Discharge: HOME | DRG: 981 | End: 2024-09-10
Attending: INTERNAL MEDICINE | Admitting: INTERNAL MEDICINE
Payer: MEDICARE

## 2024-08-28 VITALS
WEIGHT: 198.9 LBS | BODY MASS INDEX: 30.14 KG/M2 | OXYGEN SATURATION: 95 % | RESPIRATION RATE: 22 BRPM | DIASTOLIC BLOOD PRESSURE: 87 MMHG | TEMPERATURE: 97.5 F | SYSTOLIC BLOOD PRESSURE: 129 MMHG | HEART RATE: 99 BPM | HEIGHT: 68 IN

## 2024-08-28 DIAGNOSIS — I50.23 ACUTE ON CHRONIC LEFT SYSTOLIC HEART FAILURE (HCC): Primary | ICD-10-CM

## 2024-08-28 DIAGNOSIS — G89.18 ACUTE POST-OPERATIVE PAIN: ICD-10-CM

## 2024-08-28 DIAGNOSIS — R06.02 SHORTNESS OF BREATH: ICD-10-CM

## 2024-08-28 DIAGNOSIS — I31.39 PERICARDIAL EFFUSION: ICD-10-CM

## 2024-08-28 PROBLEM — E66.9 MODERATE OBESITY: Status: ACTIVE | Noted: 2024-08-28

## 2024-08-28 PROBLEM — E66.8 MODERATE OBESITY: Status: ACTIVE | Noted: 2024-08-28

## 2024-08-28 PROBLEM — I50.41 ACUTE COMBINED SYSTOLIC AND DIASTOLIC HEART FAILURE (HCC): Status: ACTIVE | Noted: 2024-08-28

## 2024-08-28 PROBLEM — J90 PLEURAL EFFUSION: Status: ACTIVE | Noted: 2024-08-28

## 2024-08-28 PROBLEM — I10 PRIMARY HYPERTENSION: Status: ACTIVE | Noted: 2024-08-28

## 2024-08-28 PROBLEM — D64.9 ANEMIA: Status: ACTIVE | Noted: 2024-08-28

## 2024-08-28 PROBLEM — N17.9 ACUTE KIDNEY INJURY (HCC): Status: ACTIVE | Noted: 2024-08-28

## 2024-08-28 LAB
ALBUMIN SERPL-MCNC: 3.5 G/DL (ref 3.5–5.2)
ALP SERPL-CCNC: 103 U/L (ref 40–129)
ALT SERPL-CCNC: 37 U/L (ref 0–40)
ANION GAP SERPL CALCULATED.3IONS-SCNC: 15 MMOL/L (ref 7–16)
AST SERPL-CCNC: 40 U/L (ref 0–39)
B PARAP IS1001 DNA NPH QL NAA+NON-PROBE: NOT DETECTED
B PERT DNA SPEC QL NAA+PROBE: NOT DETECTED
BASOPHILS # BLD: 0.02 K/UL (ref 0–0.2)
BASOPHILS NFR BLD: 0 % (ref 0–2)
BILIRUB SERPL-MCNC: 0.8 MG/DL (ref 0–1.2)
BUN SERPL-MCNC: 37 MG/DL (ref 6–23)
C PNEUM DNA NPH QL NAA+NON-PROBE: NOT DETECTED
CALCIUM SERPL-MCNC: 9.1 MG/DL (ref 8.6–10.2)
CHLORIDE SERPL-SCNC: 103 MMOL/L (ref 98–107)
CHOLEST SERPL-MCNC: 116 MG/DL
CO2 SERPL-SCNC: 18 MMOL/L (ref 22–29)
CREAT SERPL-MCNC: 2.1 MG/DL (ref 0.7–1.2)
ECHO AV AREA PEAK VELOCITY: 2.7 CM2
ECHO AV AREA VTI: 2.9 CM2
ECHO AV AREA/BSA PEAK VELOCITY: 1.3 CM2/M2
ECHO AV AREA/BSA VTI: 1.4 CM2/M2
ECHO AV CUSP MM: 2.2 CM
ECHO AV MEAN GRADIENT: 3 MMHG
ECHO AV MEAN VELOCITY: 0.9 M/S
ECHO AV PEAK GRADIENT: 5 MMHG
ECHO AV PEAK VELOCITY: 1.2 M/S
ECHO AV VELOCITY RATIO: 0.75
ECHO AV VTI: 18.1 CM
ECHO BSA: 2.1 M2
ECHO EST RA PRESSURE: 15 MMHG
ECHO LA DIAMETER INDEX: 1.37 CM/M2
ECHO LA DIAMETER: 2.8 CM
ECHO LA VOL A-L A2C: 35 ML (ref 18–58)
ECHO LA VOL A-L A4C: 38 ML (ref 18–58)
ECHO LA VOL BP: 37 ML (ref 18–58)
ECHO LA VOL MOD A2C: 34 ML (ref 18–58)
ECHO LA VOL MOD A4C: 32 ML (ref 18–58)
ECHO LA VOL/BSA BIPLANE: 18 ML/M2 (ref 16–34)
ECHO LA VOLUME AREA LENGTH: 41 ML
ECHO LA VOLUME INDEX A-L A2C: 17 ML/M2 (ref 16–34)
ECHO LA VOLUME INDEX A-L A4C: 19 ML/M2 (ref 16–34)
ECHO LA VOLUME INDEX AREA LENGTH: 20 ML/M2 (ref 16–34)
ECHO LA VOLUME INDEX MOD A2C: 17 ML/M2 (ref 16–34)
ECHO LA VOLUME INDEX MOD A4C: 16 ML/M2 (ref 16–34)
ECHO LV EDV A2C: 67 ML
ECHO LV EDV A4C: 99 ML
ECHO LV EDV BP: 84 ML (ref 67–155)
ECHO LV EDV INDEX A4C: 49 ML/M2
ECHO LV EDV INDEX BP: 41 ML/M2
ECHO LV EDV NDEX A2C: 33 ML/M2
ECHO LV EJECTION FRACTION A2C: 43 %
ECHO LV EJECTION FRACTION A4C: 53 %
ECHO LV EJECTION FRACTION BIPLANE: 47 % (ref 55–100)
ECHO LV ESV A2C: 38 ML
ECHO LV ESV A4C: 47 ML
ECHO LV ESV BP: 45 ML (ref 22–58)
ECHO LV ESV INDEX A2C: 19 ML/M2
ECHO LV ESV INDEX A4C: 23 ML/M2
ECHO LV ESV INDEX BP: 22 ML/M2
ECHO LV FRACTIONAL SHORTENING: 12 % (ref 28–44)
ECHO LV INTERNAL DIMENSION DIASTOLE INDEX: 2.06 CM/M2
ECHO LV INTERNAL DIMENSION DIASTOLIC: 4.2 CM (ref 4.2–5.9)
ECHO LV INTERNAL DIMENSION SYSTOLIC INDEX: 1.81 CM/M2
ECHO LV INTERNAL DIMENSION SYSTOLIC: 3.7 CM
ECHO LV ISOVOLUMETRIC RELAXATION TIME (IVRT): 137 MS
ECHO LV IVSD: 1.8 CM (ref 0.6–1)
ECHO LV MASS 2D: 364.9 G (ref 88–224)
ECHO LV MASS INDEX 2D: 178.9 G/M2 (ref 49–115)
ECHO LV POSTERIOR WALL DIASTOLIC: 2 CM (ref 0.6–1)
ECHO LV RELATIVE WALL THICKNESS RATIO: 0.95
ECHO LVOT AREA: 3.5 CM2
ECHO LVOT AV VTI INDEX: 0.81
ECHO LVOT DIAM: 2.1 CM
ECHO LVOT MEAN GRADIENT: 2 MMHG
ECHO LVOT PEAK GRADIENT: 3 MMHG
ECHO LVOT PEAK VELOCITY: 0.9 M/S
ECHO LVOT STROKE VOLUME INDEX: 24.9 ML/M2
ECHO LVOT SV: 50.9 ML
ECHO LVOT VTI: 14.7 CM
ECHO MV A VELOCITY: 0.56 M/S
ECHO MV AREA PHT: 5.2 CM2
ECHO MV AREA VTI: 3.3 CM2
ECHO MV E DECELERATION TIME (DT): 111.5 MS
ECHO MV E VELOCITY: 0.58 M/S
ECHO MV E/A RATIO: 1.04
ECHO MV LVOT VTI INDEX: 1.06
ECHO MV MAX VELOCITY: 0.6 M/S
ECHO MV MEAN GRADIENT: 1 MMHG
ECHO MV MEAN VELOCITY: 0.4 M/S
ECHO MV PEAK GRADIENT: 1 MMHG
ECHO MV PRESSURE HALF TIME (PHT): 42 MS
ECHO MV VTI: 15.6 CM
ECHO PV MAX VELOCITY: 0.7 M/S
ECHO PV MEAN GRADIENT: 1 MMHG
ECHO PV MEAN VELOCITY: 0.6 M/S
ECHO PV PEAK GRADIENT: 2 MMHG
ECHO PV VTI: 12.4 CM
ECHO PVEIN PEAK D VELOCITY: 0.3 M/S
ECHO PVEIN PEAK S VELOCITY: 0.4 M/S
ECHO PVEIN S/D RATIO: 1.3
ECHO RV INTERNAL DIMENSION: 3.2 CM
ECHO RV LONGITUDINAL DIMENSION: 7.4 CM
ECHO RV MID DIMENSION: 2.6 CM
EOSINOPHIL # BLD: 0.05 K/UL (ref 0.05–0.5)
EOSINOPHILS RELATIVE PERCENT: 1 % (ref 0–6)
ERYTHROCYTE [DISTWIDTH] IN BLOOD BY AUTOMATED COUNT: 18.5 % (ref 11.5–15)
FLUAV RNA NPH QL NAA+NON-PROBE: NOT DETECTED
FLUBV RNA NPH QL NAA+NON-PROBE: NOT DETECTED
GFR, ESTIMATED: 31 ML/MIN/1.73M2
GLUCOSE SERPL-MCNC: 100 MG/DL (ref 74–99)
HADV DNA NPH QL NAA+NON-PROBE: NOT DETECTED
HBA1C MFR BLD: 5.3 % (ref 4–5.6)
HCOV 229E RNA NPH QL NAA+NON-PROBE: NOT DETECTED
HCOV HKU1 RNA NPH QL NAA+NON-PROBE: NOT DETECTED
HCOV NL63 RNA NPH QL NAA+NON-PROBE: NOT DETECTED
HCOV OC43 RNA NPH QL NAA+NON-PROBE: NOT DETECTED
HCT VFR BLD AUTO: 26.9 % (ref 37–54)
HDLC SERPL-MCNC: 32 MG/DL
HGB BLD-MCNC: 8.7 G/DL (ref 12.5–16.5)
HMPV RNA NPH QL NAA+NON-PROBE: NOT DETECTED
HPIV1 RNA NPH QL NAA+NON-PROBE: NOT DETECTED
HPIV2 RNA NPH QL NAA+NON-PROBE: NOT DETECTED
HPIV3 RNA NPH QL NAA+NON-PROBE: NOT DETECTED
HPIV4 RNA NPH QL NAA+NON-PROBE: NOT DETECTED
IMM GRANULOCYTES # BLD AUTO: <0.03 K/UL (ref 0–0.58)
IMM GRANULOCYTES NFR BLD: 0 % (ref 0–5)
LDLC SERPL CALC-MCNC: 65 MG/DL
LYMPHOCYTES NFR BLD: 0.71 K/UL (ref 1.5–4)
LYMPHOCYTES RELATIVE PERCENT: 14 % (ref 20–42)
M PNEUMO DNA NPH QL NAA+NON-PROBE: NOT DETECTED
MCH RBC QN AUTO: 34.8 PG (ref 26–35)
MCHC RBC AUTO-ENTMCNC: 32.3 G/DL (ref 32–34.5)
MCV RBC AUTO: 107.6 FL (ref 80–99.9)
MONOCYTES NFR BLD: 0.47 K/UL (ref 0.1–0.95)
MONOCYTES NFR BLD: 10 % (ref 2–12)
NEUTROPHILS NFR BLD: 74 % (ref 43–80)
NEUTS SEG NFR BLD: 3.7 K/UL (ref 1.8–7.3)
PLATELET # BLD AUTO: 218 K/UL (ref 130–450)
PMV BLD AUTO: 10.6 FL (ref 7–12)
POTASSIUM SERPL-SCNC: 3.7 MMOL/L (ref 3.5–5)
PROT SERPL-MCNC: 7.1 G/DL (ref 6.4–8.3)
RBC # BLD AUTO: 2.5 M/UL (ref 3.8–5.8)
RSV RNA NPH QL NAA+NON-PROBE: NOT DETECTED
RV+EV RNA NPH QL NAA+NON-PROBE: NOT DETECTED
SARS-COV-2 RNA NPH QL NAA+NON-PROBE: NOT DETECTED
SODIUM SERPL-SCNC: 136 MMOL/L (ref 132–146)
SPECIMEN DESCRIPTION: NORMAL
T4 FREE SERPL-MCNC: 1.5 NG/DL (ref 0.9–1.7)
TRIGL SERPL-MCNC: 93 MG/DL
TROPONIN I SERPL HS-MCNC: 122 NG/L (ref 0–11)
TSH SERPL DL<=0.05 MIU/L-ACNC: 1.75 UIU/ML (ref 0.27–4.2)
VLDLC SERPL CALC-MCNC: 19 MG/DL
WBC OTHER # BLD: 5 K/UL (ref 4.5–11.5)

## 2024-08-28 PROCEDURE — 0202U NFCT DS 22 TRGT SARS-COV-2: CPT

## 2024-08-28 PROCEDURE — 99239 HOSP IP/OBS DSCHRG MGMT >30: CPT | Performed by: INTERNAL MEDICINE

## 2024-08-28 PROCEDURE — 36415 COLL VENOUS BLD VENIPUNCTURE: CPT

## 2024-08-28 PROCEDURE — 2140000000 HC CCU INTERMEDIATE R&B

## 2024-08-28 PROCEDURE — 93306 TTE W/DOPPLER COMPLETE: CPT

## 2024-08-28 PROCEDURE — 84484 ASSAY OF TROPONIN QUANT: CPT

## 2024-08-28 PROCEDURE — 80053 COMPREHEN METABOLIC PANEL: CPT

## 2024-08-28 PROCEDURE — 93306 TTE W/DOPPLER COMPLETE: CPT | Performed by: INTERNAL MEDICINE

## 2024-08-28 PROCEDURE — 80061 LIPID PANEL: CPT

## 2024-08-28 PROCEDURE — 84439 ASSAY OF FREE THYROXINE: CPT

## 2024-08-28 PROCEDURE — 6360000002 HC RX W HCPCS: Performed by: INTERNAL MEDICINE

## 2024-08-28 PROCEDURE — 84443 ASSAY THYROID STIM HORMONE: CPT

## 2024-08-28 PROCEDURE — 2580000003 HC RX 258: Performed by: INTERNAL MEDICINE

## 2024-08-28 PROCEDURE — APPSS180 APP SPLIT SHARED TIME > 60 MINUTES: Performed by: NURSE PRACTITIONER

## 2024-08-28 PROCEDURE — 99223 1ST HOSP IP/OBS HIGH 75: CPT | Performed by: INTERNAL MEDICINE

## 2024-08-28 PROCEDURE — 85025 COMPLETE CBC W/AUTO DIFF WBC: CPT

## 2024-08-28 PROCEDURE — 83036 HEMOGLOBIN GLYCOSYLATED A1C: CPT

## 2024-08-28 RX ORDER — ONDANSETRON 2 MG/ML
4 INJECTION INTRAMUSCULAR; INTRAVENOUS EVERY 6 HOURS PRN
Status: DISCONTINUED | OUTPATIENT
Start: 2024-08-28 | End: 2024-08-29

## 2024-08-28 RX ORDER — ENOXAPARIN SODIUM 100 MG/ML
40 INJECTION SUBCUTANEOUS DAILY
Status: CANCELLED | OUTPATIENT
Start: 2024-08-28

## 2024-08-28 RX ORDER — POLYETHYLENE GLYCOL 3350 17 G/17G
17 POWDER, FOR SOLUTION ORAL DAILY PRN
Status: CANCELLED | OUTPATIENT
Start: 2024-08-28

## 2024-08-28 RX ORDER — SODIUM CHLORIDE 0.9 % (FLUSH) 0.9 %
10 SYRINGE (ML) INJECTION EVERY 12 HOURS SCHEDULED
Status: CANCELLED | OUTPATIENT
Start: 2024-08-28

## 2024-08-28 RX ORDER — SODIUM CHLORIDE 0.9 % (FLUSH) 0.9 %
10 SYRINGE (ML) INJECTION EVERY 12 HOURS SCHEDULED
Status: DISCONTINUED | OUTPATIENT
Start: 2024-08-28 | End: 2024-09-10 | Stop reason: HOSPADM

## 2024-08-28 RX ORDER — ACETAMINOPHEN 325 MG/1
650 TABLET ORAL EVERY 6 HOURS PRN
Status: CANCELLED | OUTPATIENT
Start: 2024-08-28

## 2024-08-28 RX ORDER — ACETAMINOPHEN 650 MG/1
650 SUPPOSITORY RECTAL EVERY 6 HOURS PRN
Status: DISCONTINUED | OUTPATIENT
Start: 2024-08-28 | End: 2024-08-29 | Stop reason: SDUPTHER

## 2024-08-28 RX ORDER — ENOXAPARIN SODIUM 100 MG/ML
40 INJECTION SUBCUTANEOUS DAILY
Status: DISCONTINUED | OUTPATIENT
Start: 2024-08-29 | End: 2024-08-29

## 2024-08-28 RX ORDER — ONDANSETRON 2 MG/ML
4 INJECTION INTRAMUSCULAR; INTRAVENOUS EVERY 6 HOURS PRN
Status: CANCELLED | OUTPATIENT
Start: 2024-08-28

## 2024-08-28 RX ORDER — POLYETHYLENE GLYCOL 3350 17 G/17G
17 POWDER, FOR SOLUTION ORAL DAILY PRN
Status: DISCONTINUED | OUTPATIENT
Start: 2024-08-28 | End: 2024-08-29

## 2024-08-28 RX ORDER — FUROSEMIDE 10 MG/ML
20 INJECTION INTRAMUSCULAR; INTRAVENOUS 2 TIMES DAILY
Status: CANCELLED | OUTPATIENT
Start: 2024-08-28

## 2024-08-28 RX ORDER — SODIUM CHLORIDE 9 MG/ML
INJECTION, SOLUTION INTRAVENOUS PRN
Status: CANCELLED | OUTPATIENT
Start: 2024-08-28

## 2024-08-28 RX ORDER — ACETAMINOPHEN 325 MG/1
650 TABLET ORAL EVERY 6 HOURS PRN
Status: DISCONTINUED | OUTPATIENT
Start: 2024-08-28 | End: 2024-08-29 | Stop reason: SDUPTHER

## 2024-08-28 RX ORDER — PROMETHAZINE HYDROCHLORIDE 12.5 MG/1
12.5 TABLET ORAL EVERY 6 HOURS PRN
Status: DISCONTINUED | OUTPATIENT
Start: 2024-08-28 | End: 2024-09-10 | Stop reason: HOSPADM

## 2024-08-28 RX ORDER — SODIUM CHLORIDE 9 MG/ML
INJECTION, SOLUTION INTRAVENOUS PRN
Status: DISCONTINUED | OUTPATIENT
Start: 2024-08-28 | End: 2024-08-29 | Stop reason: SDUPTHER

## 2024-08-28 RX ORDER — ACETAMINOPHEN 650 MG/1
650 SUPPOSITORY RECTAL EVERY 6 HOURS PRN
Status: CANCELLED | OUTPATIENT
Start: 2024-08-28

## 2024-08-28 RX ORDER — FUROSEMIDE 10 MG/ML
20 INJECTION INTRAMUSCULAR; INTRAVENOUS 2 TIMES DAILY
Status: DISCONTINUED | OUTPATIENT
Start: 2024-08-29 | End: 2024-08-29

## 2024-08-28 RX ORDER — PROMETHAZINE HYDROCHLORIDE 25 MG/1
12.5 TABLET ORAL EVERY 6 HOURS PRN
Status: CANCELLED | OUTPATIENT
Start: 2024-08-28

## 2024-08-28 RX ORDER — SODIUM CHLORIDE 0.9 % (FLUSH) 0.9 %
10 SYRINGE (ML) INJECTION PRN
Status: CANCELLED | OUTPATIENT
Start: 2024-08-28

## 2024-08-28 RX ORDER — SODIUM CHLORIDE 0.9 % (FLUSH) 0.9 %
10 SYRINGE (ML) INJECTION PRN
Status: DISCONTINUED | OUTPATIENT
Start: 2024-08-28 | End: 2024-09-10 | Stop reason: HOSPADM

## 2024-08-28 RX ADMIN — FUROSEMIDE 20 MG: 10 INJECTION, SOLUTION INTRAMUSCULAR; INTRAVENOUS at 09:32

## 2024-08-28 RX ADMIN — Medication 10 ML: at 09:32

## 2024-08-28 RX ADMIN — SODIUM CHLORIDE, PRESERVATIVE FREE 10 ML: 5 INJECTION INTRAVENOUS at 22:05

## 2024-08-28 RX ADMIN — FUROSEMIDE 20 MG: 10 INJECTION, SOLUTION INTRAMUSCULAR; INTRAVENOUS at 17:49

## 2024-08-28 ASSESSMENT — PAIN SCALES - GENERAL
PAINLEVEL_OUTOF10: 0
PAINLEVEL_OUTOF10: 0

## 2024-08-28 NOTE — H&P
MEDIPORT CATHETER INSERTION performed by Antoine Brothers MD at [facility] OR    COLONOSCOPY  11/02/2016    KNEE SURGERY Right     TONSILLECTOMY      US LYMPH NODE BIOPSY  12/30/2021    US LYMPH NODE BIOPSY 12/30/2021 [facility] ULTRASOUND    US LYMPH NODE BIOPSY  3/17/2023    US LYMPH NODE BIOPSY 3/17/2023 [facility] ULTRASOUND       Medications Prior to Admission:    Prior to Admission medications    Medication Sig Start Date End Date Taking? Authorizing Provider   pantoprazole (PROTONIX) 40 MG tablet Take 1 tablet by mouth daily 7/11/24  Yes Jory Tillman MD   enzalutamide (XTANDI) 40 MG capsule Take 2 capsules by mouth daily   Yes Vipin Valiente MD   Multiple Vitamins-Minerals (THERAPEUTIC MULTIVITAMIN-MINERALS) tablet Take 1 tablet by mouth daily   Yes Vipin Valiente MD   KRILL OIL PO Take 1 capsule by mouth daily   Yes Vipin Valiente MD   EPINEPHrine (EPIPEN 2-YULIYA) 0.3 MG/0.3ML SOAJ injection Inject 0.3 mLs into the muscle once for 1 dose Use as directed for allergic reaction 2/19/24 5/20/24  Swetha Pope MD   acetaminophen (TYLENOL) 500 MG tablet Take 2 tablets by mouth as needed for Pain Usually takes twice a day  Patient not taking: Reported on 2/19/2024    Vipin Valiente MD   ibuprofen (ADVIL;MOTRIN) 800 MG tablet Take 1 tablet by mouth every 8 hours as needed for Pain With meal  Patient not taking: Reported on 8/27/2024 3/13/23   Swetha Pope MD       Allergies:    Patient has no known allergies.    Social History:    reports that he quit smoking about 37 years ago. His smoking use included cigarettes. He started smoking about 42 years ago. He has a 5 pack-year smoking history. He has never used smokeless tobacco. He reports that he does not drink alcohol and does not use drugs.      Family History:   family history includes Atrial Fibrillation in his mother; Breast Cancer in his sister; Coronary Art Dis in his mother; Dementia in his mother; High Blood Pressure in his  performed after the administration of intravenous contrast.  Multiplanar reformatted images are provided for review.  MIP images are provided for review. Automated exposure control, iterative reconstruction, and/or weight based adjustment of the mA/kV was utilized to reduce the radiation dose to as low as reasonably achievable. COMPARISON: 05/03/2023 HISTORY: ORDERING SYSTEM PROVIDED HISTORY: chest pain, hx prostate cancer TECHNOLOGIST PROVIDED HISTORY: Reason for exam:->chest pain, hx prostate cancer Additional Contrast?->1 FINDINGS: Pulmonary Arteries: Pulmonary arteries are adequately opacified for evaluation.  No evidence of intraluminal filling defect to suggest pulmonary embolism.  Main pulmonary artery is normal in caliber. Mediastinum: No evidence of mediastinal lymphadenopathy.  Normal heart size. Large pericardial effusion.  There is no acute abnormality of the thoracic aorta. Lungs/pleura: The lungs are without acute process.  No focal consolidation or pulmonary edema.  Small bilateral pleural effusions.  No pneumothorax. Upper Abdomen: Left renal cortical atrophy.  Mild left hydronephrosis.  No obstructing calculi demonstrated. Soft Tissues/Bones: No acute bone or soft tissue abnormality.  Degenerative and kyphotic changes thoracic spine.     1. No evidence of pulmonary embolism. 2. Large pericardial effusion. 3. Small bilateral pleural effusions. 4. Left renal cortical atrophy. Mild left hydronephrosis. No obstructing calculi demonstrated.     ASSESSMENT:    Present on Admission:   Acute on chronic left systolic heart failure (HCC)    PLAN:  # Acute exacerbation of CHF  BNP elevated, 7163  B/l pleural effusion large pericardial effusion, no tamponade  Fluid restriction  IV diuretics  Strict I/O and daily weight  Will adjust and continue home medications as needed   Echo and cardiology consult  # Elevated Troponin   Might be due to demand ischemia and vs CKD   Antipletelets + Statin on board   Will trend

## 2024-08-28 NOTE — CONSULTS
PV: 2+ left lower leg edema, trace right lower leg edema. No varicosities. Pedal pulses palpable, no clubbing or cyanosis   ABDOMEN: Softly distended, non-tender to light palpation. Bowel sounds present. No palpable masses no organomegaly; no abdominal bruit  MS: Good muscle strength and tone. No atrophy or abnormal movements.   : Deferred  SKIN: Warm and dry no statis dermatitis or ulcers   NEURO / PSYCH: Oriented to person, place and time. Speech clear and appropriate. Follows all commands. Pleasant affect     Wt Readings from Last 3 Encounters:   08/28/24 90.2 kg (198 lb 14.4 oz)   06/09/24 91.6 kg (202 lb)   05/20/24 95.8 kg (211 lb 3.2 oz)     DATA:    ECG: As per Dr. Salazar' interpretation  Tele strips: NSR, HR 90's    Diagnostic:  CTA PULMONARY W CONTRAST 8/27/2024: No evidence of pulmonary embolism. Large pericardial effusion. Small bilateral pleural effusions. Left renal cortical atrophy. Mild left hydronephrosis. No obstructing calculi demonstrated.        Intake/Output Summary (Last 24 hours) at 8/28/2024 1025  Last data filed at 8/28/2024 0700  Gross per 24 hour   Intake 1140 ml   Output 1600 ml   Net -460 ml       Labs:   CBC:   Recent Labs     08/27/24  1451 08/28/24  0540   WBC 6.0 5.0   HGB 8.9* 8.7*   HCT 27.8* 26.9*    218     BMP:   Recent Labs     08/27/24  1451      K 4.2   CO2 21*   BUN 39*   CREATININE 2.2*   LABGLOM 30*   CALCIUM 9.6     TFT:   Lab Results   Component Value Date    TSH 1.58 04/23/2024    TSH 2.120 12/16/2022    T4FREE 1.43 04/29/2015    T4FREE 1.26 10/27/2014      HgA1c:   Lab Results   Component Value Date/Time    LABA1C 4.9 12/07/2021 10:40 AM    LABA1C 5.0 10/08/2020 09:14 AM     proBNP:   Lab Results   Component Value Date/Time    PROBNP 7,163 08/27/2024 02:51 PM    PROBNP 1,507 05/03/2023 12:10 AM     PT/INR:   Recent Labs     08/27/24  1451   PROTIME 18.2*   INR 1.7     TROPONIN:  Lab Results   Component Value Date/Time    TROPHS 130 08/27/2024 04:08 PM  assessment by the cardiothoracic surgical service and needs of a likely pericardial window including assessment of fluids and a potential pericardial biopsy to assess the etiology of his effusion.  In the interim, supportive care will be necessary.  Additional management of noncardiovascular issues will be deferred to primary care as well as the oncology service with needs of careful monitoring of his renal function in view of acute kidney injury as well as that of his anemia.  On a long-term basis, ongoing appropriate monitoring of blood pressure and serum lipids will be necessary to reduce risk of future atherosclerotic development.    I have participated in a substantive portion of the present encounter inclusive of a component of independent history and examination in addition to that of medical decision making regarding patient care.    Thank you for allowing me to participate in your patient's care. Please feel free to contact me if you have any questions or concerns.    Ranjit Salazar MD  [facility] Cardiology

## 2024-08-28 NOTE — PROGRESS NOTES
Spiritual Health Assessment/Progress Note  [facility]     Encounter, Rituals, Rites and Sacraments,  ,  ,      Name: Ace Hanna MRN: [id number]    Age: 78 y.o.     Sex: male   Language: English   Jain: Jew   Acute on chronic left systolic heart failure (HCC)     Date: 8/28/2024                           Spiritual Assessment began in [employer] [employer] [facility] MED SURG TELE        Referral/Consult From: Rounding   Encounter Overview/Reason:  Encounter, Rituals, Rites and Sacraments  Service Provided For: Patient    Misty, Belief, Meaning:   Patient is connected with a misty tradition or spiritual practice  Family/Friends No family/friends present      Importance and Influence:  Patient has no beliefs influential to healthcare decision-making identified during this visit  Family/Friends no family/friends present    Community:  Patient feels well-supported. Support system includes: Extended family  Family/Friends Other: Unable to assess    Assessment and Plan of Care:     Patient Interventions include: Provided sacramental/Congregation ritual  Family/Friends Interventions include: Other: None    Patient Plan of Care: Spiritual Care available upon further referral  Family/Friends Plan of Care: Spiritual Care available upon further referral    Electronically signed by Chaplain Betsy on 8/28/2024 at 5:48 PM

## 2024-08-28 NOTE — CARE COORDINATION
Case Management Assessment  Initial Evaluation    Date/Time of Evaluation: 8/28/2024 1:13 PM  Assessment Completed by: Carmelina Blanc RN    If patient is discharged prior to next notation, then this note serves as note for discharge by case management.    Patient Name: Ace Hanna                   YOB: 1945  Diagnosis: Pericardial effusion [I31.39]  Pleural effusion [J90]  THU (acute kidney injury) (HCC) [N17.9]  Acute on chronic left systolic heart failure (HCC) [I50.23]  Chest pain, unspecified type [R07.9]  Acute anemia [D64.9]  Acute congestive heart failure, unspecified heart failure type (HCC) [I50.9]                   Date / Time: 8/27/2024  2:42 PM    Patient Admission Status: Inpatient   Readmission Risk (Low < 19, Mod (19-27), High > 27): Readmission Risk Score: 15    Current PCP: No primary care provider on file.  PCP verified by CM? Yes    Chart Reviewed: Yes      History Provided by: Patient, Spouse  Patient Orientation: Alert and Oriented, Person, Place, Situation    Patient Cognition: Alert    Hospitalization in the last 30 days (Readmission):  No    If yes, Readmission Assessment in CM Navigator will be completed.    Advance Directives:      Code Status: Full Code   Patient's Primary Decision Maker is: Legal Next of Kin    Primary Decision Maker: Maddi Hanna - Spouse - [phone]    Discharge Planning:    Patient lives with: Spouse/Significant Other Type of Home: House  Primary Care Giver: Self  Patient Support Systems include: Spouse/Significant Other   Current Financial resources:    Current community resources:    Current services prior to admission: None            Current DME:              Type of Home Care services:  None    ADLS  Prior functional level: Independent in ADLs/IADLs  Current functional level: Assistance with the following:, Mobility    PT AM-PAC:   /24  OT AM-PAC:   /24    Family can provide assistance at DC: Yes  Would you like Case Management to discuss the

## 2024-08-28 NOTE — ACP (ADVANCE CARE PLANNING)
Advance Care Planning   Healthcare Decision Maker:    Primary Decision Maker: Maddi Hanna - [facility] - [phone]

## 2024-08-28 NOTE — PROGRESS NOTES
4 Eyes Skin Assessment     NAME:  Ace Hanna  YOB: 1945  MEDICAL RECORD NUMBER:  [id number]    The patient is being assessed for  Admission    I agree that at least one RN has performed a thorough Head to Toe Skin Assessment on the patient. ALL assessment sites listed below have been assessed.      Areas assessed by both nurses:    Head, Face, Ears, Shoulders, Back, Chest, Arms, Elbows, Hands, Sacrum. Buttock, Coccyx, Ischium, and Legs. Feet and Heels        Does the Patient have a Wound? No noted wound(s)       Praneeth Prevention initiated by RN: No  Wound Care Orders initiated by RN: No    Pressure Injury (Stage 3,4, Unstageable, DTI, NWPT, and Complex wounds) if present, place Wound referral order by RN under : No    New Ostomies, if present place, Ostomy referral order under : No     Nurse 1 eSignature: Electronically signed by Ace Morfin RN on 8/27/24 at 11:49 PM EDT    **SHARE this note so that the co-signing nurse can place an eSignature**    Nurse 2 eSignature: Electronically signed by Aubree Sams LPN on 8/27/24 at 11:53 PM EDT

## 2024-08-28 NOTE — DISCHARGE SUMMARY
twice a day    ibuprofen (ADVIL;MOTRIN) 800 MG tablet  Take 1 tablet by mouth every 8 hours as needed for Pain With meal  Qty: 40 tablet Refills: 1  Associated Diagnoses:Right flank pain          Current Discharge Medication List        Time Spent on Discharge:  45 minutes were spent in patient examination, evaluation, counseling as well as medication reconciliation, prescriptions for required medications, discharge plan, and follow up.    Electronically signed by Adair Thurman MD on 8/28/24 at 12:25 PM EDT

## 2024-08-28 NOTE — ED PROVIDER NOTES
[facility] 4 MED SURG TELE  EMERGENCY DEPARTMENT ENCOUNTER        Pt Name: Ace Hanna  MRN: [id number]  Birthdate 1945  Date of evaluation: 8/27/2024  Provider: Germaine Gimenez DO  PCP: Jory Tillman MD  Note Started: 10:13 PM EDT 8/27/24    CHIEF COMPLAINT       Chief Complaint   Patient presents with    Chest Pain     Chest pain 2-3 days, short of breath for awhile, coughing started last night, right arm pain from previous fall, no n/v/d, c/o fatigue, prostate ca, done with chemo 6 months       HISTORY OF PRESENT ILLNESS: 1 or more Elements        Limitations to history : None    Ace Hanna is a 78 y.o. male  who presents with chest pain since the onset 2 days ago with associated shortness of breath, dyspnea on exertion as well as a productive cough.  He does also complain of fatigue.  Denies nausea, vomiting, palpitations or diaphoresis.  He does have right arm pain but this is from a fall a few weeks ago.  Denies any remitting or exacerbating factors.  He does have a history of prostate cancer with his last chemotherapy 6 months ago.    Nursing Notes were all reviewed and agreed with or any disagreements were addressed in the HPI.      REVIEW OF EXTERNAL NOTE :       Reviewed previous ER encounter on 6/9/2024 for back pain      Chart Review/External Note Review    Last Echo reviewed by Me:  Lab Results   Component Value Date    LVEF 62 05/03/2023             Controlled Substance Monitoring:    Acute and Chronic Pain Monitoring:        No data to display                    REVIEW OF SYSTEMS :      Positives and Pertinent negatives as per HPI.     SURGICAL HISTORY     Past Surgical History:   Procedure Laterality Date    APPENDECTOMY      BLADDER SURGERY Left 3/16/2023    CYSTOSCOPY RETROGRADE PYELOGRAM LEFT STENT INSERTION **CALL WITH TIME** performed by Nathan López MD at [facility] OR    CARPAL TUNNEL RELEASE Right     CATHETER INSERTION N/A 2/22/2022    MEDIPORT CATHETER INSERTION  EKG Interpretation  Interpreted by emergency department physician, Dr. Gimenez    Rhythm: normal sinus   Rate: normal  Axis: normal  Ectopy: none  Conduction: left anterior fasciclar block  ST Segments: no acute change  T Waves: no acute change  Q Waves: none    Clinical Impression: no acute changes, compared to 6/9/24   []   8088 Spoke with Dr Dunbar, ok to keep here [HH]      ED Course User Index  [HH] Germaine Gimenez, DO       Medical Decision Making  Amount and/or Complexity of Data Reviewed  Labs: ordered.  Radiology: ordered.  ECG/medicine tests: ordered.    Risk  OTC drugs.  Prescription drug management.  Decision regarding hospitalization.          MDM:  The differential diagnosis associated with the patient’s presentation includes: : ACS, pericarditis, cardiac tamponade, thoracic dissection, pneumothorax,  pneumomediastinum, pneumonia, congestive heart gailure, pulmonary embolus, GI cause,  othe    My independent interpretation of the EKG and/or imaging in ED Course.     Discussed with radiology regarding test interpretation na    Additional history obtained from or confirmed by: na    Management of the patient was discussed with: Dr Hernandez, Dr Dunbar    Escalation of care including admission/observation considered:admission    Ace Hanna is a 78 y.o. male  who presents with chest pain since the onset 2 days ago with associated shortness of breath, dyspnea on exertion as well as a productive cough.  He does also complain of fatigue.  Denies nausea, vomiting, palpitations or diaphoresis.  He does have right arm pain but this is from a fall a few weeks ago.  Denies any remitting or exacerbating factors.  He does have a history of prostate cancer with his last chemotherapy 6 months ago.    Labs were notable for a hemoglobin 8.9, creatinine at 2.2 which is increased from his baseline, initial troponin was 124, repeat troponin was 130, proBNP was 7163.  COVID was negative.  CT of the chest performed did show

## 2024-08-28 NOTE — PROGRESS NOTES
Progress Note    Subjective:  Patient wiped out and exhausted.  He states he just cannot move because he cannot breathe.  He is not really having any chest pain.  No fevers or chills.  No heartburn or nausea.  Just extremely weak and fatigued.  Patient reports he is to have a bone marrow biopsy in about 2 weeks to assess the persistent and worsening anemia.    Objective:    /68   Pulse 98   Temp 98.9 °F (37.2 °C) (Oral)   Resp 22   Ht 1.73 m (5' 8.11\")   Wt 90.2 kg (198 lb 14.4 oz)   SpO2 94%   BMI 30.15 kg/m²     General: Pleasant elderly gentleman awake and alert oriented x 3.    HEENT: PERRLA, anicteric sclera, oropharynx is clear, no mucositis or thrush.  Heart:  RRR, no murmurs, gallops, or rubs.  Lungs:  CTA bilaterally, no wheeze, rales or rhonchi  Abd: bowel sounds present, nontender, nondistended, no masses  Extrem:  No clubbing, cyanosis, or edema      CBC:   Lab Results   Component Value Date/Time    WBC 5.0 08/28/2024 05:40 AM    RBC 2.50 08/28/2024 05:40 AM    HGB 8.7 08/28/2024 05:40 AM    HCT 26.9 08/28/2024 05:40 AM    .6 08/28/2024 05:40 AM    MCH 34.8 08/28/2024 05:40 AM    MCHC 32.3 08/28/2024 05:40 AM    RDW 18.5 08/28/2024 05:40 AM     08/28/2024 05:40 AM    MPV 10.6 08/28/2024 05:40 AM     CMP:    Lab Results   Component Value Date/Time     08/28/2024 06:07 AM    K 3.7 08/28/2024 06:07 AM    K 4.4 02/25/2020 08:34 AM     08/28/2024 06:07 AM    CO2 18 08/28/2024 06:07 AM    BUN 37 08/28/2024 06:07 AM    CREATININE 2.1 08/28/2024 06:07 AM    CREATININE 1.2 12/30/2019 12:00 AM    GFRAA >60 02/21/2022 01:04 PM    LABGLOM 31 08/28/2024 06:07 AM    LABGLOM 59 04/23/2024 10:30 AM    GLUCOSE 100 08/28/2024 06:07 AM    GLUCOSE 102 02/24/2012 08:38 AM    CALCIUM 9.1 08/28/2024 06:07 AM    BILITOT 0.8 08/28/2024 06:07 AM    ALKPHOS 103 08/28/2024 06:07 AM    AST 40 08/28/2024 06:07 AM    ALT 37 08/28/2024 06:07 AM            CTA PULMONARY W CONTRAST   Final Result  morning.    Plan:  -Follow-up on the echocardiogram to evaluate the pericardial effusion.  Those findings will dictate further evaluation and workup.  He may need pericardial window.  If that is the case, would then make sure fluid is sent for analysis to determine etiology of the effusion.  - For the macrocytic anemia, patient was to have an outpatient bone marrow biopsy.  Will check a thyroid test.  - CBC and CMP daily.  - Continue with DVT prophylaxis with Lovenox      Electronically signed by Sheyla Arroyo MD on 8/28/2024 at 12:05 PM

## 2024-08-29 ENCOUNTER — ANESTHESIA EVENT (OUTPATIENT)
Dept: OPERATING ROOM | Age: 79
End: 2024-08-29
Payer: MEDICARE

## 2024-08-29 ENCOUNTER — APPOINTMENT (OUTPATIENT)
Dept: GENERAL RADIOLOGY | Age: 79
DRG: 981 | End: 2024-08-29
Attending: INTERNAL MEDICINE
Payer: MEDICARE

## 2024-08-29 ENCOUNTER — ANESTHESIA (OUTPATIENT)
Dept: OPERATING ROOM | Age: 79
End: 2024-08-29
Payer: MEDICARE

## 2024-08-29 LAB
ALBUMIN SERPL-MCNC: 3.7 G/DL (ref 3.5–5.2)
ALP SERPL-CCNC: 114 U/L (ref 40–129)
ALT SERPL-CCNC: 40 U/L (ref 0–40)
ANION GAP SERPL CALCULATED.3IONS-SCNC: 15 MMOL/L (ref 7–16)
ANION GAP SERPL CALCULATED.3IONS-SCNC: 16 MMOL/L (ref 7–16)
AST SERPL-CCNC: 42 U/L (ref 0–39)
BASOPHILS # BLD: 0.03 K/UL (ref 0–0.2)
BASOPHILS NFR BLD: 1 % (ref 0–2)
BILIRUB SERPL-MCNC: 1.2 MG/DL (ref 0–1.2)
BUN SERPL-MCNC: 41 MG/DL (ref 6–23)
BUN SERPL-MCNC: 42 MG/DL (ref 6–23)
CALCIUM SERPL-MCNC: 9.1 MG/DL (ref 8.6–10.2)
CALCIUM SERPL-MCNC: 9.3 MG/DL (ref 8.6–10.2)
CHLORIDE SERPL-SCNC: 104 MMOL/L (ref 98–107)
CHLORIDE SERPL-SCNC: 105 MMOL/L (ref 98–107)
CO2 SERPL-SCNC: 20 MMOL/L (ref 22–29)
CO2 SERPL-SCNC: 21 MMOL/L (ref 22–29)
CREAT SERPL-MCNC: 2 MG/DL (ref 0.7–1.2)
CREAT SERPL-MCNC: 2 MG/DL (ref 0.7–1.2)
EKG ATRIAL RATE: 91 BPM
EKG P AXIS: 45 DEGREES
EKG P-R INTERVAL: 184 MS
EKG Q-T INTERVAL: 418 MS
EKG QRS DURATION: 104 MS
EKG QTC CALCULATION (BAZETT): 514 MS
EKG R AXIS: -47 DEGREES
EKG T AXIS: 107 DEGREES
EKG VENTRICULAR RATE: 91 BPM
EOSINOPHIL # BLD: 0.06 K/UL (ref 0.05–0.5)
EOSINOPHILS RELATIVE PERCENT: 1 % (ref 0–6)
ERYTHROCYTE [DISTWIDTH] IN BLOOD BY AUTOMATED COUNT: 18.6 % (ref 11.5–15)
ERYTHROCYTE [DISTWIDTH] IN BLOOD BY AUTOMATED COUNT: 18.7 % (ref 11.5–15)
GFR, ESTIMATED: 33 ML/MIN/1.73M2
GFR, ESTIMATED: 33 ML/MIN/1.73M2
GLUCOSE SERPL-MCNC: 102 MG/DL (ref 74–99)
GLUCOSE SERPL-MCNC: 115 MG/DL (ref 74–99)
HCT VFR BLD AUTO: 27.1 % (ref 37–54)
HCT VFR BLD AUTO: 27.6 % (ref 37–54)
HGB BLD-MCNC: 8.7 G/DL (ref 12.5–16.5)
HGB BLD-MCNC: 9.1 G/DL (ref 12.5–16.5)
IMM GRANULOCYTES # BLD AUTO: <0.03 K/UL (ref 0–0.58)
IMM GRANULOCYTES NFR BLD: 0 % (ref 0–5)
INR PPP: 1.8
LYMPHOCYTES NFR BLD: 0.62 K/UL (ref 1.5–4)
LYMPHOCYTES RELATIVE PERCENT: 14 % (ref 20–42)
MAGNESIUM SERPL-MCNC: 1.7 MG/DL (ref 1.6–2.6)
MAGNESIUM SERPL-MCNC: 1.8 MG/DL (ref 1.6–2.6)
MCH RBC QN AUTO: 33.6 PG (ref 26–35)
MCH RBC QN AUTO: 35.8 PG (ref 26–35)
MCHC RBC AUTO-ENTMCNC: 31.5 G/DL (ref 32–34.5)
MCHC RBC AUTO-ENTMCNC: 33.6 G/DL (ref 32–34.5)
MCV RBC AUTO: 106.6 FL (ref 80–99.9)
MCV RBC AUTO: 106.7 FL (ref 80–99.9)
MONOCYTES NFR BLD: 0.48 K/UL (ref 0.1–0.95)
MONOCYTES NFR BLD: 11 % (ref 2–12)
NEUTROPHILS NFR BLD: 73 % (ref 43–80)
NEUTS SEG NFR BLD: 3.24 K/UL (ref 1.8–7.3)
PHOSPHATE SERPL-MCNC: 4.1 MG/DL (ref 2.5–4.5)
PLATELET # BLD AUTO: 207 K/UL (ref 130–450)
PLATELET # BLD AUTO: 210 K/UL (ref 130–450)
PMV BLD AUTO: 10.4 FL (ref 7–12)
PMV BLD AUTO: 10.7 FL (ref 7–12)
POTASSIUM SERPL-SCNC: 3.6 MMOL/L (ref 3.5–5)
POTASSIUM SERPL-SCNC: 3.6 MMOL/L (ref 3.5–5)
PROT SERPL-MCNC: 7.1 G/DL (ref 6.4–8.3)
PROTHROMBIN TIME: 20.4 SEC (ref 9.3–12.4)
RBC # BLD AUTO: 2.54 M/UL (ref 3.8–5.8)
RBC # BLD AUTO: 2.59 M/UL (ref 3.8–5.8)
SODIUM SERPL-SCNC: 140 MMOL/L (ref 132–146)
SODIUM SERPL-SCNC: 141 MMOL/L (ref 132–146)
TROPONIN I SERPL HS-MCNC: 140 NG/L (ref 0–11)
WBC OTHER # BLD: 4.4 K/UL (ref 4.5–11.5)
WBC OTHER # BLD: 4.9 K/UL (ref 4.5–11.5)

## 2024-08-29 PROCEDURE — 80048 BASIC METABOLIC PNL TOTAL CA: CPT

## 2024-08-29 PROCEDURE — 3700000001 HC ADD 15 MINUTES (ANESTHESIA): Performed by: STUDENT IN AN ORGANIZED HEALTH CARE EDUCATION/TRAINING PROGRAM

## 2024-08-29 PROCEDURE — 33025 INCISION OF HEART SAC: CPT | Performed by: STUDENT IN AN ORGANIZED HEALTH CARE EDUCATION/TRAINING PROGRAM

## 2024-08-29 PROCEDURE — 93005 ELECTROCARDIOGRAM TRACING: CPT

## 2024-08-29 PROCEDURE — 2720000010 HC SURG SUPPLY STERILE: Performed by: STUDENT IN AN ORGANIZED HEALTH CARE EDUCATION/TRAINING PROGRAM

## 2024-08-29 PROCEDURE — 2580000003 HC RX 258: Performed by: INTERNAL MEDICINE

## 2024-08-29 PROCEDURE — 99222 1ST HOSP IP/OBS MODERATE 55: CPT | Performed by: STUDENT IN AN ORGANIZED HEALTH CARE EDUCATION/TRAINING PROGRAM

## 2024-08-29 PROCEDURE — 6360000002 HC RX W HCPCS: Performed by: PHYSICIAN ASSISTANT

## 2024-08-29 PROCEDURE — 3600000015 HC SURGERY LEVEL 5 ADDTL 15MIN: Performed by: STUDENT IN AN ORGANIZED HEALTH CARE EDUCATION/TRAINING PROGRAM

## 2024-08-29 PROCEDURE — 3700000000 HC ANESTHESIA ATTENDED CARE: Performed by: STUDENT IN AN ORGANIZED HEALTH CARE EDUCATION/TRAINING PROGRAM

## 2024-08-29 PROCEDURE — 85027 COMPLETE CBC AUTOMATED: CPT

## 2024-08-29 PROCEDURE — 7100000001 HC PACU RECOVERY - ADDTL 15 MIN: Performed by: STUDENT IN AN ORGANIZED HEALTH CARE EDUCATION/TRAINING PROGRAM

## 2024-08-29 PROCEDURE — 84484 ASSAY OF TROPONIN QUANT: CPT

## 2024-08-29 PROCEDURE — 2140000000 HC CCU INTERMEDIATE R&B

## 2024-08-29 PROCEDURE — 33025 INCISION OF HEART SAC: CPT | Performed by: PHYSICIAN ASSISTANT

## 2024-08-29 PROCEDURE — 85610 PROTHROMBIN TIME: CPT

## 2024-08-29 PROCEDURE — 88342 IMHCHEM/IMCYTCHM 1ST ANTB: CPT

## 2024-08-29 PROCEDURE — 87015 SPECIMEN INFECT AGNT CONCNTJ: CPT

## 2024-08-29 PROCEDURE — 2500000003 HC RX 250 WO HCPCS: Performed by: NURSE ANESTHETIST, CERTIFIED REGISTERED

## 2024-08-29 PROCEDURE — 71045 X-RAY EXAM CHEST 1 VIEW: CPT

## 2024-08-29 PROCEDURE — 87116 MYCOBACTERIA CULTURE: CPT

## 2024-08-29 PROCEDURE — 88341 IMHCHEM/IMCYTCHM EA ADD ANTB: CPT

## 2024-08-29 PROCEDURE — 2580000003 HC RX 258: Performed by: PHYSICIAN ASSISTANT

## 2024-08-29 PROCEDURE — 99221 1ST HOSP IP/OBS SF/LOW 40: CPT | Performed by: INTERNAL MEDICINE

## 2024-08-29 PROCEDURE — 83735 ASSAY OF MAGNESIUM: CPT

## 2024-08-29 PROCEDURE — 87102 FUNGUS ISOLATION CULTURE: CPT

## 2024-08-29 PROCEDURE — C1729 CATH, DRAINAGE: HCPCS | Performed by: STUDENT IN AN ORGANIZED HEALTH CARE EDUCATION/TRAINING PROGRAM

## 2024-08-29 PROCEDURE — 36415 COLL VENOUS BLD VENIPUNCTURE: CPT

## 2024-08-29 PROCEDURE — 0W9D0ZZ DRAINAGE OF PERICARDIAL CAVITY, OPEN APPROACH: ICD-10-PCS | Performed by: STUDENT IN AN ORGANIZED HEALTH CARE EDUCATION/TRAINING PROGRAM

## 2024-08-29 PROCEDURE — 6360000002 HC RX W HCPCS

## 2024-08-29 PROCEDURE — 87070 CULTURE OTHR SPECIMN AEROBIC: CPT

## 2024-08-29 PROCEDURE — 86901 BLOOD TYPING SEROLOGIC RH(D): CPT

## 2024-08-29 PROCEDURE — B24BZZ4 ULTRASONOGRAPHY OF HEART WITH AORTA, TRANSESOPHAGEAL: ICD-10-PCS | Performed by: STUDENT IN AN ORGANIZED HEALTH CARE EDUCATION/TRAINING PROGRAM

## 2024-08-29 PROCEDURE — 86923 COMPATIBILITY TEST ELECTRIC: CPT

## 2024-08-29 PROCEDURE — 7100000000 HC PACU RECOVERY - FIRST 15 MIN: Performed by: STUDENT IN AN ORGANIZED HEALTH CARE EDUCATION/TRAINING PROGRAM

## 2024-08-29 PROCEDURE — 2700000000 HC OXYGEN THERAPY PER DAY

## 2024-08-29 PROCEDURE — 88305 TISSUE EXAM BY PATHOLOGIST: CPT

## 2024-08-29 PROCEDURE — 3600000005 HC SURGERY LEVEL 5 BASE: Performed by: STUDENT IN AN ORGANIZED HEALTH CARE EDUCATION/TRAINING PROGRAM

## 2024-08-29 PROCEDURE — 2580000003 HC RX 258

## 2024-08-29 PROCEDURE — 87206 SMEAR FLUORESCENT/ACID STAI: CPT

## 2024-08-29 PROCEDURE — 87205 SMEAR GRAM STAIN: CPT

## 2024-08-29 PROCEDURE — 88112 CYTOPATH CELL ENHANCE TECH: CPT

## 2024-08-29 PROCEDURE — 86900 BLOOD TYPING SEROLOGIC ABO: CPT

## 2024-08-29 PROCEDURE — 85025 COMPLETE CBC W/AUTO DIFF WBC: CPT

## 2024-08-29 PROCEDURE — 86850 RBC ANTIBODY SCREEN: CPT

## 2024-08-29 PROCEDURE — 99233 SBSQ HOSP IP/OBS HIGH 50: CPT | Performed by: INTERNAL MEDICINE

## 2024-08-29 PROCEDURE — 6360000002 HC RX W HCPCS: Performed by: NURSE ANESTHETIST, CERTIFIED REGISTERED

## 2024-08-29 PROCEDURE — 6370000000 HC RX 637 (ALT 250 FOR IP): Performed by: PHYSICIAN ASSISTANT

## 2024-08-29 PROCEDURE — 87075 CULTR BACTERIA EXCEPT BLOOD: CPT

## 2024-08-29 PROCEDURE — 84154 ASSAY OF PSA FREE: CPT

## 2024-08-29 PROCEDURE — 84100 ASSAY OF PHOSPHORUS: CPT

## 2024-08-29 PROCEDURE — 80053 COMPREHEN METABOLIC PANEL: CPT

## 2024-08-29 PROCEDURE — 2709999900 HC NON-CHARGEABLE SUPPLY: Performed by: STUDENT IN AN ORGANIZED HEALTH CARE EDUCATION/TRAINING PROGRAM

## 2024-08-29 RX ORDER — SODIUM CHLORIDE 0.9 % (FLUSH) 0.9 %
5-40 SYRINGE (ML) INJECTION EVERY 12 HOURS SCHEDULED
Status: DISCONTINUED | OUTPATIENT
Start: 2024-08-29 | End: 2024-09-05 | Stop reason: SDUPTHER

## 2024-08-29 RX ORDER — ENOXAPARIN SODIUM 100 MG/ML
30 INJECTION SUBCUTANEOUS DAILY
Status: DISCONTINUED | OUTPATIENT
Start: 2024-08-30 | End: 2024-09-02

## 2024-08-29 RX ORDER — ONDANSETRON 2 MG/ML
INJECTION INTRAMUSCULAR; INTRAVENOUS PRN
Status: DISCONTINUED | OUTPATIENT
Start: 2024-08-29 | End: 2024-08-29 | Stop reason: SDUPTHER

## 2024-08-29 RX ORDER — MAGNESIUM SULFATE IN WATER 40 MG/ML
2000 INJECTION, SOLUTION INTRAVENOUS ONCE
Status: COMPLETED | OUTPATIENT
Start: 2024-08-29 | End: 2024-08-29

## 2024-08-29 RX ORDER — OXYCODONE HYDROCHLORIDE 5 MG/1
5 TABLET ORAL EVERY 4 HOURS PRN
Status: DISCONTINUED | OUTPATIENT
Start: 2024-08-29 | End: 2024-09-10 | Stop reason: HOSPADM

## 2024-08-29 RX ORDER — BISACODYL 5 MG/1
5 TABLET, DELAYED RELEASE ORAL DAILY PRN
Status: DISCONTINUED | OUTPATIENT
Start: 2024-08-30 | End: 2024-09-06

## 2024-08-29 RX ORDER — DEXAMETHASONE SODIUM PHOSPHATE 10 MG/ML
INJECTION INTRAMUSCULAR; INTRAVENOUS PRN
Status: DISCONTINUED | OUTPATIENT
Start: 2024-08-29 | End: 2024-08-29 | Stop reason: SDUPTHER

## 2024-08-29 RX ORDER — ROCURONIUM BROMIDE 10 MG/ML
INJECTION, SOLUTION INTRAVENOUS PRN
Status: DISCONTINUED | OUTPATIENT
Start: 2024-08-29 | End: 2024-08-29 | Stop reason: SDUPTHER

## 2024-08-29 RX ORDER — POLYETHYLENE GLYCOL 3350 17 G/17G
17 POWDER, FOR SOLUTION ORAL DAILY PRN
Status: DISCONTINUED | OUTPATIENT
Start: 2024-08-29 | End: 2024-09-10 | Stop reason: HOSPADM

## 2024-08-29 RX ORDER — SUCCINYLCHOLINE/SOD CL,ISO/PF 200MG/10ML
SYRINGE (ML) INTRAVENOUS PRN
Status: DISCONTINUED | OUTPATIENT
Start: 2024-08-29 | End: 2024-08-29 | Stop reason: SDUPTHER

## 2024-08-29 RX ORDER — ESMOLOL HYDROCHLORIDE 10 MG/ML
INJECTION INTRAVENOUS PRN
Status: DISCONTINUED | OUTPATIENT
Start: 2024-08-29 | End: 2024-08-29 | Stop reason: SDUPTHER

## 2024-08-29 RX ORDER — BACITRACIN ZINC 500 [USP'U]/G
OINTMENT TOPICAL DAILY
Status: DISCONTINUED | OUTPATIENT
Start: 2024-08-29 | End: 2024-09-10 | Stop reason: HOSPADM

## 2024-08-29 RX ORDER — SENNA AND DOCUSATE SODIUM 50; 8.6 MG/1; MG/1
1 TABLET, FILM COATED ORAL 2 TIMES DAILY
Status: DISCONTINUED | OUTPATIENT
Start: 2024-08-30 | End: 2024-09-10 | Stop reason: HOSPADM

## 2024-08-29 RX ORDER — FENTANYL CITRATE 50 UG/ML
INJECTION, SOLUTION INTRAMUSCULAR; INTRAVENOUS PRN
Status: DISCONTINUED | OUTPATIENT
Start: 2024-08-29 | End: 2024-08-29 | Stop reason: SDUPTHER

## 2024-08-29 RX ORDER — ONDANSETRON 2 MG/ML
4 INJECTION INTRAMUSCULAR; INTRAVENOUS EVERY 6 HOURS PRN
Status: DISCONTINUED | OUTPATIENT
Start: 2024-08-29 | End: 2024-09-10 | Stop reason: HOSPADM

## 2024-08-29 RX ORDER — SODIUM CHLORIDE 9 MG/ML
INJECTION, SOLUTION INTRAVENOUS PRN
Status: DISCONTINUED | OUTPATIENT
Start: 2024-08-29 | End: 2024-09-10 | Stop reason: HOSPADM

## 2024-08-29 RX ORDER — MORPHINE SULFATE 2 MG/ML
2 INJECTION, SOLUTION INTRAMUSCULAR; INTRAVENOUS EVERY 4 HOURS PRN
Status: DISCONTINUED | OUTPATIENT
Start: 2024-08-29 | End: 2024-09-10 | Stop reason: HOSPADM

## 2024-08-29 RX ORDER — ACETAMINOPHEN 325 MG/1
650 TABLET ORAL EVERY 6 HOURS PRN
Status: DISCONTINUED | OUTPATIENT
Start: 2024-08-29 | End: 2024-08-29

## 2024-08-29 RX ORDER — SODIUM CHLORIDE 0.9 % (FLUSH) 0.9 %
5-40 SYRINGE (ML) INJECTION PRN
Status: DISCONTINUED | OUTPATIENT
Start: 2024-08-29 | End: 2024-09-05 | Stop reason: SDUPTHER

## 2024-08-29 RX ORDER — POTASSIUM CHLORIDE 7.45 MG/ML
10 INJECTION INTRAVENOUS
Status: COMPLETED | OUTPATIENT
Start: 2024-08-29 | End: 2024-08-29

## 2024-08-29 RX ORDER — MUPIROCIN 20 MG/G
OINTMENT TOPICAL 2 TIMES DAILY
Status: COMPLETED | OUTPATIENT
Start: 2024-08-29 | End: 2024-09-01

## 2024-08-29 RX ORDER — ACETAMINOPHEN 650 MG/1
650 SUPPOSITORY RECTAL EVERY 6 HOURS PRN
Status: DISCONTINUED | OUTPATIENT
Start: 2024-08-29 | End: 2024-08-29

## 2024-08-29 RX ORDER — PROCHLORPERAZINE EDISYLATE 5 MG/ML
10 INJECTION INTRAMUSCULAR; INTRAVENOUS EVERY 6 HOURS PRN
Status: DISCONTINUED | OUTPATIENT
Start: 2024-08-29 | End: 2024-09-10 | Stop reason: HOSPADM

## 2024-08-29 RX ORDER — ONDANSETRON 4 MG/1
4 TABLET, ORALLY DISINTEGRATING ORAL EVERY 8 HOURS PRN
Status: DISCONTINUED | OUTPATIENT
Start: 2024-08-29 | End: 2024-08-29

## 2024-08-29 RX ORDER — ACETAMINOPHEN 325 MG/1
650 TABLET ORAL EVERY 6 HOURS
Status: COMPLETED | OUTPATIENT
Start: 2024-08-29 | End: 2024-08-31

## 2024-08-29 RX ORDER — ACETAMINOPHEN 325 MG/1
650 TABLET ORAL EVERY 4 HOURS PRN
Status: DISCONTINUED | OUTPATIENT
Start: 2024-08-31 | End: 2024-09-10 | Stop reason: HOSPADM

## 2024-08-29 RX ORDER — ETOMIDATE 2 MG/ML
INJECTION INTRAVENOUS PRN
Status: DISCONTINUED | OUTPATIENT
Start: 2024-08-29 | End: 2024-08-29 | Stop reason: SDUPTHER

## 2024-08-29 RX ORDER — LIDOCAINE HYDROCHLORIDE 20 MG/ML
INJECTION, SOLUTION INTRAVENOUS PRN
Status: DISCONTINUED | OUTPATIENT
Start: 2024-08-29 | End: 2024-08-29 | Stop reason: SDUPTHER

## 2024-08-29 RX ORDER — ONDANSETRON 2 MG/ML
4 INJECTION INTRAMUSCULAR; INTRAVENOUS EVERY 6 HOURS PRN
Status: DISCONTINUED | OUTPATIENT
Start: 2024-08-29 | End: 2024-08-29

## 2024-08-29 RX ADMIN — CEFAZOLIN 2000 MG: 2 INJECTION, POWDER, FOR SOLUTION INTRAMUSCULAR; INTRAVENOUS at 13:27

## 2024-08-29 RX ADMIN — MUPIROCIN: 20 OINTMENT TOPICAL at 19:55

## 2024-08-29 RX ADMIN — WATER 2000 MG: 1 INJECTION INTRAMUSCULAR; INTRAVENOUS; SUBCUTANEOUS at 22:38

## 2024-08-29 RX ADMIN — ACETAMINOPHEN 650 MG: 325 TABLET ORAL at 17:52

## 2024-08-29 RX ADMIN — FENTANYL CITRATE 50 MCG: 0.05 INJECTION, SOLUTION INTRAMUSCULAR; INTRAVENOUS at 13:50

## 2024-08-29 RX ADMIN — FENTANYL CITRATE 50 MCG: 0.05 INJECTION, SOLUTION INTRAMUSCULAR; INTRAVENOUS at 13:18

## 2024-08-29 RX ADMIN — Medication 120 MG: at 13:18

## 2024-08-29 RX ADMIN — SODIUM CHLORIDE: 9 INJECTION, SOLUTION INTRAVENOUS at 13:10

## 2024-08-29 RX ADMIN — MAGNESIUM SULFATE HEPTAHYDRATE 2000 MG: 40 INJECTION, SOLUTION INTRAVENOUS at 16:08

## 2024-08-29 RX ADMIN — ESMOLOL HYDROCHLORIDE 30 MG: 10 INJECTION, SOLUTION INTRAVENOUS at 14:08

## 2024-08-29 RX ADMIN — ACETAMINOPHEN 650 MG: 325 TABLET ORAL at 22:38

## 2024-08-29 RX ADMIN — POTASSIUM CHLORIDE 10 MEQ: 7.46 INJECTION, SOLUTION INTRAVENOUS at 20:44

## 2024-08-29 RX ADMIN — POTASSIUM CHLORIDE 10 MEQ: 7.46 INJECTION, SOLUTION INTRAVENOUS at 17:34

## 2024-08-29 RX ADMIN — ETOMIDATE 10 MG: 2 INJECTION, SOLUTION INTRAVENOUS at 13:18

## 2024-08-29 RX ADMIN — ROCURONIUM BROMIDE 50 MG: 10 INJECTION, SOLUTION INTRAVENOUS at 13:25

## 2024-08-29 RX ADMIN — SODIUM CHLORIDE, PRESERVATIVE FREE 10 ML: 5 INJECTION INTRAVENOUS at 10:46

## 2024-08-29 RX ADMIN — DEXAMETHASONE SODIUM PHOSPHATE 10 MG: 10 INJECTION INTRAMUSCULAR; INTRAVENOUS at 13:29

## 2024-08-29 RX ADMIN — SUGAMMADEX 200 MG: 100 INJECTION, SOLUTION INTRAVENOUS at 14:05

## 2024-08-29 RX ADMIN — POTASSIUM CHLORIDE 10 MEQ: 7.46 INJECTION, SOLUTION INTRAVENOUS at 19:35

## 2024-08-29 RX ADMIN — POTASSIUM CHLORIDE 10 MEQ: 7.46 INJECTION, SOLUTION INTRAVENOUS at 16:18

## 2024-08-29 RX ADMIN — SODIUM CHLORIDE, PRESERVATIVE FREE 10 ML: 5 INJECTION INTRAVENOUS at 19:55

## 2024-08-29 RX ADMIN — BACITRACIN ZINC: 500 OINTMENT TOPICAL at 19:55

## 2024-08-29 RX ADMIN — LIDOCAINE HYDROCHLORIDE 100 MG: 20 INJECTION, SOLUTION INTRAVENOUS at 13:18

## 2024-08-29 RX ADMIN — ONDANSETRON 4 MG: 2 INJECTION INTRAMUSCULAR; INTRAVENOUS at 13:57

## 2024-08-29 ASSESSMENT — PAIN SCALES - GENERAL
PAINLEVEL_OUTOF10: 0
PAINLEVEL_OUTOF10: 4
PAINLEVEL_OUTOF10: 0
PAINLEVEL_OUTOF10: 0
PAINLEVEL_OUTOF10: 3
PAINLEVEL_OUTOF10: 3

## 2024-08-29 ASSESSMENT — PAIN - FUNCTIONAL ASSESSMENT
PAIN_FUNCTIONAL_ASSESSMENT: ACTIVITIES ARE NOT PREVENTED
PAIN_FUNCTIONAL_ASSESSMENT: 0-10

## 2024-08-29 ASSESSMENT — PAIN DESCRIPTION - LOCATION
LOCATION: CHEST
LOCATION: CHEST;INCISION

## 2024-08-29 ASSESSMENT — PAIN DESCRIPTION - ORIENTATION
ORIENTATION: MID
ORIENTATION: MID

## 2024-08-29 ASSESSMENT — PAIN DESCRIPTION - DESCRIPTORS
DESCRIPTORS: DISCOMFORT;SORE;TENDER
DESCRIPTORS: ACHING;BURNING;CRAMPING
DESCRIPTORS: ACHING;DISCOMFORT;SORE

## 2024-08-29 ASSESSMENT — ENCOUNTER SYMPTOMS: SHORTNESS OF BREATH: 1

## 2024-08-29 ASSESSMENT — PAIN DESCRIPTION - PAIN TYPE: TYPE: SURGICAL PAIN

## 2024-08-29 NOTE — ANESTHESIA POSTPROCEDURE EVALUATION
Department of Anesthesiology  Postprocedure Note    Patient: Ace Hanna  MRN: [id number]  YOB: 1945  Date of evaluation: 8/29/2024    Procedure Summary       Date: 08/29/24 Room / Location: [address]    Anesthesia Start: 1310 Anesthesia Stop: 1417    Procedure: PERICARDIAL WINDOW CREATION, NICOLE (Chest) Diagnosis:       Pericardial effusion      (Pericardial effusion [I31.39])    Surgeons: Yumiko Klein DO Responsible Provider: Ace Carrillo DO    Anesthesia Type: general ASA Status: 4            Anesthesia Type: No value filed.    Jacobo Phase I: Jacobo Score: 9    Jacobo Phase II:      Anesthesia Post Evaluation    Patient location during evaluation: PACU  Patient participation: complete - patient participated  Level of consciousness: awake and alert  Airway patency: patent  Nausea & Vomiting: no nausea and no vomiting  Cardiovascular status: blood pressure returned to baseline  Respiratory status: acceptable  Hydration status: euvolemic  Multimodal analgesia pain management approach  Pain management: adequate    No notable events documented.

## 2024-08-29 NOTE — ANESTHESIA PROCEDURE NOTES
Procedure Performed: NICOLE       Start Time:        End Time:      Preanesthesia Checklist:  Patient identified, IV assessed, risks and benefits discussed, monitors and equipment assessed, procedure being performed at surgeon's request and anesthesia consent obtained.    General Procedure Information  Diagnostic Indications for Echo:  assessment of surgical repair, hemodynamic monitoring and assessment of valve function  Physician Requesting Echo: Yumiko Klein DO  Location performed:  OR  Intubated  Bite block placed  Heart visualized  Probe Insertion:  Easy  Probe Type:  3D and mulitplane  Modalities:  3D, color flow mapping, pulse wave Doppler and continuous wave Doppler    Echocardiographic and Doppler Measurements    Ventricles    Right Ventricle:  Cavity size normal.  Hypertrophy not present.  Thrombus not present.  Global function normal.    Left Ventricle:  Cavity size normal.  Hypertrophy not present.  Thrombus not present.  Global Function normal.      Ventricular Regional Function:  1- Basal Anteroseptal:  normal  2- Basal Anterior:  normal  3- Basal Anterolateral:  normal  4- Basal Inferolateral:  normal  5- Basal Inferior:  normal  6- Basal Inferoseptal:  normal  7- Mid Anteroseptal:  normal  8- Mid Anterior:  normal  9- Mid Anterolateral:  normal  10- Mid Inferolateral:  normal  11- Mid Inferior:  normal  12- Mid Inferoseptal:  normal  13- Apical Anterior:  normal  14- Apical Lateral:  normal  15- Apical Inferior:  normal  16- Apical Septal:  normal  17- [place]:  normal      Valves    Aortic Valve:  Annulus normal.  Stenosis not present.  Regurgitation none.  Leaflets normal.  Leaflet motions normal.      Mitral Valve:  Annulus normal.  Stenosis not present.  Regurgitation none.  Leaflets normal.  Leaflet motions normal.      Tricuspid Valve:  Annulus normal.  Stenosis not present.  Regurgitation none.  Leaflets normal.  Leaflet motions normal.    Pulmonic Valve:  Annulus normal.  Stenosis not

## 2024-08-29 NOTE — ANESTHESIA PROCEDURE NOTES
Arterial Line:    An arterial line was placed using surface landmarks, in the OR for the following indication(s): continuous blood pressure monitoring and blood sampling needed.    A 20 gauge (size), 1 and 3/4 inch (length), Arrow (type) catheter was placed, into the left radial artery, secured by Tegaderm.  Anesthesia type: General    Events:  patient tolerated procedure well with no complications and EBL 0mL.8/29/2024 1:20 PM  Other anesthesia staff: Ambika Aguilar RN  Performed: Other anesthesia staff   Preanesthetic Checklist  Completed: patient identified, IV checked, site marked, risks and benefits discussed, surgical/procedural consents, equipment checked, pre-op evaluation, timeout performed, anesthesia consent given, oxygen available and monitors applied/VS acknowledged

## 2024-08-29 NOTE — ANESTHESIA PRE PROCEDURE
Date of last liquid consumption: 08/28/24                        Date of last solid food consumption: 08/28/24    BMI:   Wt Readings from Last 3 Encounters:   08/29/24 88.6 kg (195 lb 6.4 oz)   08/28/24 90.2 kg (198 lb 14.4 oz)   06/09/24 91.6 kg (202 lb)     Body mass index is 29.71 kg/m².    CBC:   Lab Results   Component Value Date/Time    WBC 4.4 08/29/2024 09:58 AM    RBC 2.54 08/29/2024 09:58 AM    HGB 9.1 08/29/2024 09:58 AM    HCT 27.1 08/29/2024 09:58 AM    .7 08/29/2024 09:58 AM    RDW 18.7 08/29/2024 09:58 AM     08/29/2024 09:58 AM       CMP:   Lab Results   Component Value Date/Time     08/29/2024 09:58 AM    K 3.6 08/29/2024 09:58 AM    K 4.4 02/25/2020 08:34 AM     08/29/2024 09:58 AM    CO2 20 08/29/2024 09:58 AM    BUN 41 08/29/2024 09:58 AM    CREATININE 2.0 08/29/2024 09:58 AM    CREATININE 1.2 12/30/2019 12:00 AM    GFRAA >60 02/21/2022 01:04 PM    LABGLOM 33 08/29/2024 09:58 AM    LABGLOM 59 04/23/2024 10:30 AM    GLUCOSE 102 08/29/2024 09:58 AM    GLUCOSE 102 02/24/2012 08:38 AM    CALCIUM 9.3 08/29/2024 09:58 AM    BILITOT 1.2 08/29/2024 09:58 AM    ALKPHOS 114 08/29/2024 09:58 AM    AST 42 08/29/2024 09:58 AM    ALT 40 08/29/2024 09:58 AM       POC Tests: No results for input(s): \"POCGLU\", \"POCNA\", \"POCK\", \"POCCL\", \"POCBUN\", \"POCHEMO\", \"POCHCT\" in the last 72 hours.    Coags:   Lab Results   Component Value Date/Time    PROTIME 20.4 08/29/2024 08:50 AM    INR 1.8 08/29/2024 08:50 AM       HCG (If Applicable): No results found for: \"PREGTESTUR\", \"PREGSERUM\", \"HCG\", \"HCGQUANT\"     ABGs: No results found for: \"PHART\", \"PO2ART\", \"PKA9TCP\", \"ESY8OXT\", \"BEART\", \"A9URLVWG\"     Type & Screen (If Applicable):  No results found for: \"LABABO\"    Drug/Infectious Status (If Applicable):  No results found for: \"HIV\", \"HEPCAB\"    COVID-19 Screening (If Applicable):   Lab Results   Component Value Date/Time    COVID19 Not Detected 08/28/2024 11:10 AM           Anesthesia  Evaluation  Patient summary reviewed and Nursing notes reviewed   no history of anesthetic complications:   Airway: Mallampati: II  TM distance: >3 FB   Neck ROM: full  Mouth opening: > = 3 FB   Dental:          Pulmonary:normal exam    (+)   shortness of breath: new,                                    Cardiovascular:    (+) hypertension:, CHF: diastolic and systolic                  Neuro/Psych:   Negative Neuro/Psych ROS              GI/Hepatic/Renal:   (+) GERD: well controlled          Endo/Other:    (+) malignancy/cancer (prostate).                 Abdominal:   (+) obese          Vascular: negative vascular ROS.         Other Findings:             Anesthesia Plan      general     ASA 4       Induction: intravenous.      Anesthetic plan and risks discussed with patient.      Plan discussed with attending.                    MANOHAR Holt - CRNA   8/29/2024

## 2024-08-29 NOTE — ANESTHESIA POSTPROCEDURE EVALUATION
Department of Anesthesiology  Postprocedure Note    Patient: Ace Hanna  MRN: [id number]  YOB: 1945  Date of evaluation: 8/29/2024    Procedure Summary       Date: 08/29/24 Room / Location: [address]    Anesthesia Start: 1310 Anesthesia Stop:     Procedure: PERICARDIAL WINDOW CREATION, NICOLE (Chest) Diagnosis:       Pericardial effusion      (Pericardial effusion [I31.39])    Surgeons: Yumiko Klein DO Responsible Provider: Ace Carrillo DO    Anesthesia Type: general ASA Status: 4            Anesthesia Type: No value filed.    Jacobo Phase I: Jacobo Score: 9    Jacobo Phase II:      Anesthesia Post Evaluation    Patient location during evaluation: PACU  Patient participation: complete - patient participated  Level of consciousness: awake and alert  Airway patency: patent  Nausea & Vomiting: no nausea and no vomiting  Cardiovascular status: blood pressure returned to baseline  Respiratory status: acceptable  Hydration status: euvolemic  Multimodal analgesia pain management approach  Pain management: adequate    No notable events documented.

## 2024-08-30 ENCOUNTER — APPOINTMENT (OUTPATIENT)
Dept: GENERAL RADIOLOGY | Age: 79
DRG: 981 | End: 2024-08-30
Attending: INTERNAL MEDICINE
Payer: MEDICARE

## 2024-08-30 LAB
ALBUMIN SERPL-MCNC: 3.3 G/DL (ref 3.5–5.2)
ALP SERPL-CCNC: 96 U/L (ref 40–129)
ALT SERPL-CCNC: 28 U/L (ref 0–40)
ANION GAP SERPL CALCULATED.3IONS-SCNC: 13 MMOL/L (ref 7–16)
ANION GAP SERPL CALCULATED.3IONS-SCNC: 14 MMOL/L (ref 7–16)
AST SERPL-CCNC: 36 U/L (ref 0–39)
BASOPHILS # BLD: 0.02 K/UL (ref 0–0.2)
BASOPHILS NFR BLD: 0 % (ref 0–2)
BILIRUB SERPL-MCNC: 0.5 MG/DL (ref 0–1.2)
BUN SERPL-MCNC: 43 MG/DL (ref 6–23)
BUN SERPL-MCNC: 48 MG/DL (ref 6–23)
CALCIUM SERPL-MCNC: 8.3 MG/DL (ref 8.6–10.2)
CALCIUM SERPL-MCNC: 8.8 MG/DL (ref 8.6–10.2)
CHLORIDE SERPL-SCNC: 105 MMOL/L (ref 98–107)
CHLORIDE SERPL-SCNC: 107 MMOL/L (ref 98–107)
CO2 SERPL-SCNC: 20 MMOL/L (ref 22–29)
CO2 SERPL-SCNC: 21 MMOL/L (ref 22–29)
CREAT SERPL-MCNC: 2.1 MG/DL (ref 0.7–1.2)
CREAT SERPL-MCNC: 2.4 MG/DL (ref 0.7–1.2)
EOSINOPHIL # BLD: 0.03 K/UL (ref 0.05–0.5)
EOSINOPHILS RELATIVE PERCENT: 1 % (ref 0–6)
ERYTHROCYTE [DISTWIDTH] IN BLOOD BY AUTOMATED COUNT: 18.4 % (ref 11.5–15)
GFR, ESTIMATED: 27 ML/MIN/1.73M2
GFR, ESTIMATED: 31 ML/MIN/1.73M2
GLUCOSE BLD-MCNC: 203 MG/DL (ref 74–99)
GLUCOSE SERPL-MCNC: 134 MG/DL (ref 74–99)
GLUCOSE SERPL-MCNC: 89 MG/DL (ref 74–99)
HCT VFR BLD AUTO: 27.2 % (ref 37–54)
HCT VFR BLD AUTO: 29.1 % (ref 37–54)
HGB BLD-MCNC: 8.8 G/DL (ref 12.5–16.5)
HGB BLD-MCNC: 9.4 G/DL (ref 12.5–16.5)
IMM GRANULOCYTES # BLD AUTO: 0.04 K/UL (ref 0–0.58)
IMM GRANULOCYTES NFR BLD: 1 % (ref 0–5)
LYMPHOCYTES NFR BLD: 0.85 K/UL (ref 1.5–4)
LYMPHOCYTES RELATIVE PERCENT: 13 % (ref 20–42)
MAGNESIUM SERPL-MCNC: 2.2 MG/DL (ref 1.6–2.6)
MCH RBC QN AUTO: 34.9 PG (ref 26–35)
MCHC RBC AUTO-ENTMCNC: 32.4 G/DL (ref 32–34.5)
MCV RBC AUTO: 107.9 FL (ref 80–99.9)
MONOCYTES NFR BLD: 0.69 K/UL (ref 0.1–0.95)
MONOCYTES NFR BLD: 10 % (ref 2–12)
NEUTROPHILS NFR BLD: 75 % (ref 43–80)
NEUTS SEG NFR BLD: 5.03 K/UL (ref 1.8–7.3)
PHOSPHATE SERPL-MCNC: 4.2 MG/DL (ref 2.5–4.5)
PLATELET # BLD AUTO: 217 K/UL (ref 130–450)
PMV BLD AUTO: 10.4 FL (ref 7–12)
POTASSIUM SERPL-SCNC: 3.9 MMOL/L (ref 3.5–5)
POTASSIUM SERPL-SCNC: 4 MMOL/L (ref 3.5–5)
PROT SERPL-MCNC: 6.5 G/DL (ref 6.4–8.3)
RBC # BLD AUTO: 2.52 M/UL (ref 3.8–5.8)
SODIUM SERPL-SCNC: 139 MMOL/L (ref 132–146)
SODIUM SERPL-SCNC: 141 MMOL/L (ref 132–146)
TROPONIN I SERPL HS-MCNC: 197 NG/L (ref 0–11)
WBC OTHER # BLD: 6.7 K/UL (ref 4.5–11.5)

## 2024-08-30 PROCEDURE — 84100 ASSAY OF PHOSPHORUS: CPT

## 2024-08-30 PROCEDURE — 85014 HEMATOCRIT: CPT

## 2024-08-30 PROCEDURE — 2580000003 HC RX 258

## 2024-08-30 PROCEDURE — 2580000003 HC RX 258: Performed by: PHYSICIAN ASSISTANT

## 2024-08-30 PROCEDURE — 71045 X-RAY EXAM CHEST 1 VIEW: CPT

## 2024-08-30 PROCEDURE — 36415 COLL VENOUS BLD VENIPUNCTURE: CPT

## 2024-08-30 PROCEDURE — 6370000000 HC RX 637 (ALT 250 FOR IP)

## 2024-08-30 PROCEDURE — 2580000003 HC RX 258: Performed by: INTERNAL MEDICINE

## 2024-08-30 PROCEDURE — 85025 COMPLETE CBC W/AUTO DIFF WBC: CPT

## 2024-08-30 PROCEDURE — 84484 ASSAY OF TROPONIN QUANT: CPT

## 2024-08-30 PROCEDURE — 99231 SBSQ HOSP IP/OBS SF/LOW 25: CPT | Performed by: INTERNAL MEDICINE

## 2024-08-30 PROCEDURE — 6370000000 HC RX 637 (ALT 250 FOR IP): Performed by: PHYSICIAN ASSISTANT

## 2024-08-30 PROCEDURE — 2140000000 HC CCU INTERMEDIATE R&B

## 2024-08-30 PROCEDURE — 6360000002 HC RX W HCPCS

## 2024-08-30 PROCEDURE — 83735 ASSAY OF MAGNESIUM: CPT

## 2024-08-30 PROCEDURE — 80053 COMPREHEN METABOLIC PANEL: CPT

## 2024-08-30 PROCEDURE — 87086 URINE CULTURE/COLONY COUNT: CPT

## 2024-08-30 PROCEDURE — 6360000002 HC RX W HCPCS: Performed by: PHYSICIAN ASSISTANT

## 2024-08-30 PROCEDURE — 99233 SBSQ HOSP IP/OBS HIGH 50: CPT | Performed by: INTERNAL MEDICINE

## 2024-08-30 PROCEDURE — 82962 GLUCOSE BLOOD TEST: CPT

## 2024-08-30 PROCEDURE — 80048 BASIC METABOLIC PNL TOTAL CA: CPT

## 2024-08-30 PROCEDURE — 85018 HEMOGLOBIN: CPT

## 2024-08-30 RX ORDER — POTASSIUM CHLORIDE 7.45 MG/ML
10 INJECTION INTRAVENOUS
Status: DISCONTINUED | OUTPATIENT
Start: 2024-08-30 | End: 2024-08-30

## 2024-08-30 RX ORDER — 0.9 % SODIUM CHLORIDE 0.9 %
500 INTRAVENOUS SOLUTION INTRAVENOUS ONCE
Status: COMPLETED | OUTPATIENT
Start: 2024-08-31 | End: 2024-08-31

## 2024-08-30 RX ORDER — 0.9 % SODIUM CHLORIDE 0.9 %
500 INTRAVENOUS SOLUTION INTRAVENOUS ONCE
Status: COMPLETED | OUTPATIENT
Start: 2024-08-30 | End: 2024-08-30

## 2024-08-30 RX ORDER — SODIUM CHLORIDE 9 MG/ML
INJECTION, SOLUTION INTRAVENOUS CONTINUOUS
Status: ACTIVE | OUTPATIENT
Start: 2024-08-30 | End: 2024-08-30

## 2024-08-30 RX ADMIN — SODIUM CHLORIDE, PRESERVATIVE FREE 10 ML: 5 INJECTION INTRAVENOUS at 09:20

## 2024-08-30 RX ADMIN — WATER 2000 MG: 1 INJECTION INTRAMUSCULAR; INTRAVENOUS; SUBCUTANEOUS at 05:38

## 2024-08-30 RX ADMIN — POTASSIUM BICARBONATE 20 MEQ: 782 TABLET, EFFERVESCENT ORAL at 11:43

## 2024-08-30 RX ADMIN — ENOXAPARIN SODIUM 30 MG: 100 INJECTION SUBCUTANEOUS at 09:19

## 2024-08-30 RX ADMIN — SODIUM CHLORIDE 500 ML: 9 INJECTION, SOLUTION INTRAVENOUS at 11:46

## 2024-08-30 RX ADMIN — SODIUM CHLORIDE, PRESERVATIVE FREE 10 ML: 5 INJECTION INTRAVENOUS at 11:47

## 2024-08-30 RX ADMIN — SODIUM CHLORIDE 500 ML: 9 INJECTION, SOLUTION INTRAVENOUS at 14:00

## 2024-08-30 RX ADMIN — ACETAMINOPHEN 650 MG: 325 TABLET ORAL at 05:38

## 2024-08-30 RX ADMIN — SENNOSIDES AND DOCUSATE SODIUM 1 TABLET: 50; 8.6 TABLET ORAL at 19:37

## 2024-08-30 RX ADMIN — SENNOSIDES AND DOCUSATE SODIUM 1 TABLET: 50; 8.6 TABLET ORAL at 09:20

## 2024-08-30 RX ADMIN — MUPIROCIN: 20 OINTMENT TOPICAL at 19:37

## 2024-08-30 RX ADMIN — ACETAMINOPHEN 650 MG: 325 TABLET ORAL at 23:14

## 2024-08-30 RX ADMIN — SODIUM CHLORIDE, PRESERVATIVE FREE 10 ML: 5 INJECTION INTRAVENOUS at 09:21

## 2024-08-30 RX ADMIN — SODIUM CHLORIDE, PRESERVATIVE FREE 10 ML: 5 INJECTION INTRAVENOUS at 19:37

## 2024-08-30 RX ADMIN — SODIUM CHLORIDE, PRESERVATIVE FREE 10 ML: 5 INJECTION INTRAVENOUS at 19:38

## 2024-08-30 RX ADMIN — ACETAMINOPHEN 650 MG: 325 TABLET ORAL at 11:43

## 2024-08-30 RX ADMIN — ACETAMINOPHEN 650 MG: 325 TABLET ORAL at 17:49

## 2024-08-30 RX ADMIN — BACITRACIN ZINC: 500 OINTMENT TOPICAL at 17:49

## 2024-08-30 RX ADMIN — WATER 2000 MG: 1 INJECTION INTRAMUSCULAR; INTRAVENOUS; SUBCUTANEOUS at 15:21

## 2024-08-30 RX ADMIN — MUPIROCIN: 20 OINTMENT TOPICAL at 09:21

## 2024-08-30 RX ADMIN — SODIUM CHLORIDE 500 ML: 9 INJECTION, SOLUTION INTRAVENOUS at 23:53

## 2024-08-30 RX ADMIN — POTASSIUM CHLORIDE 10 MEQ: 7.46 INJECTION, SOLUTION INTRAVENOUS at 09:28

## 2024-08-30 RX ADMIN — SODIUM CHLORIDE: 9 INJECTION, SOLUTION INTRAVENOUS at 14:17

## 2024-08-30 ASSESSMENT — PAIN DESCRIPTION - LOCATION
LOCATION: CHEST
LOCATION: CHEST;INCISION

## 2024-08-30 ASSESSMENT — PAIN DESCRIPTION - ORIENTATION
ORIENTATION: MID
ORIENTATION: MID;INNER

## 2024-08-30 ASSESSMENT — PAIN SCALES - GENERAL
PAINLEVEL_OUTOF10: 0
PAINLEVEL_OUTOF10: 5
PAINLEVEL_OUTOF10: 3
PAINLEVEL_OUTOF10: 0
PAINLEVEL_OUTOF10: 3
PAINLEVEL_OUTOF10: 0
PAINLEVEL_OUTOF10: 2
PAINLEVEL_OUTOF10: 0

## 2024-08-30 ASSESSMENT — PAIN - FUNCTIONAL ASSESSMENT
PAIN_FUNCTIONAL_ASSESSMENT: PREVENTS OR INTERFERES SOME ACTIVE ACTIVITIES AND ADLS
PAIN_FUNCTIONAL_ASSESSMENT: ACTIVITIES ARE NOT PREVENTED

## 2024-08-30 ASSESSMENT — PAIN DESCRIPTION - DESCRIPTORS
DESCRIPTORS: ACHING;DISCOMFORT;SORE;BURNING
DESCRIPTORS: ACHING;DISCOMFORT;SORE

## 2024-08-31 ENCOUNTER — APPOINTMENT (OUTPATIENT)
Dept: GENERAL RADIOLOGY | Age: 79
DRG: 981 | End: 2024-08-31
Attending: INTERNAL MEDICINE
Payer: MEDICARE

## 2024-08-31 ENCOUNTER — APPOINTMENT (OUTPATIENT)
Dept: CT IMAGING | Age: 79
DRG: 981 | End: 2024-08-31
Attending: INTERNAL MEDICINE
Payer: MEDICARE

## 2024-08-31 LAB
ALBUMIN SERPL-MCNC: 3.3 G/DL (ref 3.5–5.2)
ALP SERPL-CCNC: 92 U/L (ref 40–129)
ALT SERPL-CCNC: 6 U/L (ref 0–40)
ANION GAP SERPL CALCULATED.3IONS-SCNC: 14 MMOL/L (ref 7–16)
ANION GAP SERPL CALCULATED.3IONS-SCNC: 14 MMOL/L (ref 7–16)
AST SERPL-CCNC: 36 U/L (ref 0–39)
BASOPHILS # BLD: 0.02 K/UL (ref 0–0.2)
BASOPHILS # BLD: 0.02 K/UL (ref 0–0.2)
BASOPHILS NFR BLD: 0 % (ref 0–2)
BASOPHILS NFR BLD: 0 % (ref 0–2)
BILIRUB SERPL-MCNC: 0.5 MG/DL (ref 0–1.2)
BUN SERPL-MCNC: 43 MG/DL (ref 6–23)
BUN SERPL-MCNC: 47 MG/DL (ref 6–23)
CA-I BLD-SCNC: 1.08 MMOL/L (ref 1.15–1.33)
CALCIUM SERPL-MCNC: 7.6 MG/DL (ref 8.6–10.2)
CALCIUM SERPL-MCNC: 7.8 MG/DL (ref 8.6–10.2)
CHLORIDE SERPL-SCNC: 104 MMOL/L (ref 98–107)
CHLORIDE SERPL-SCNC: 105 MMOL/L (ref 98–107)
CO2 SERPL-SCNC: 18 MMOL/L (ref 22–29)
CO2 SERPL-SCNC: 18 MMOL/L (ref 22–29)
CREAT SERPL-MCNC: 2.4 MG/DL (ref 0.7–1.2)
CREAT SERPL-MCNC: 2.5 MG/DL (ref 0.7–1.2)
EOSINOPHIL # BLD: 0.03 K/UL (ref 0.05–0.5)
EOSINOPHIL # BLD: 0.04 K/UL (ref 0.05–0.5)
EOSINOPHILS RELATIVE PERCENT: 0 % (ref 0–6)
EOSINOPHILS RELATIVE PERCENT: 1 % (ref 0–6)
ERYTHROCYTE [DISTWIDTH] IN BLOOD BY AUTOMATED COUNT: 18.7 % (ref 11.5–15)
ERYTHROCYTE [DISTWIDTH] IN BLOOD BY AUTOMATED COUNT: 18.8 % (ref 11.5–15)
GFR, ESTIMATED: 26 ML/MIN/1.73M2
GFR, ESTIMATED: 27 ML/MIN/1.73M2
GLUCOSE SERPL-MCNC: 117 MG/DL (ref 74–99)
GLUCOSE SERPL-MCNC: 123 MG/DL (ref 74–99)
HCT VFR BLD AUTO: 27.3 % (ref 37–54)
HCT VFR BLD AUTO: 27.8 % (ref 37–54)
HGB BLD-MCNC: 8.5 G/DL (ref 12.5–16.5)
HGB BLD-MCNC: 8.6 G/DL (ref 12.5–16.5)
IMM GRANULOCYTES # BLD AUTO: 0.04 K/UL (ref 0–0.58)
IMM GRANULOCYTES # BLD AUTO: 0.06 K/UL (ref 0–0.58)
IMM GRANULOCYTES NFR BLD: 1 % (ref 0–5)
IMM GRANULOCYTES NFR BLD: 1 % (ref 0–5)
LACTATE BLDV-SCNC: 1.2 MMOL/L (ref 0.5–2.2)
LYMPHOCYTES NFR BLD: 0.61 K/UL (ref 1.5–4)
LYMPHOCYTES NFR BLD: 0.74 K/UL (ref 1.5–4)
LYMPHOCYTES RELATIVE PERCENT: 10 % (ref 20–42)
LYMPHOCYTES RELATIVE PERCENT: 8 % (ref 20–42)
MAGNESIUM SERPL-MCNC: 1.9 MG/DL (ref 1.6–2.6)
MCH RBC QN AUTO: 33.6 PG (ref 26–35)
MCH RBC QN AUTO: 33.7 PG (ref 26–35)
MCHC RBC AUTO-ENTMCNC: 30.6 G/DL (ref 32–34.5)
MCHC RBC AUTO-ENTMCNC: 31.5 G/DL (ref 32–34.5)
MCV RBC AUTO: 107.1 FL (ref 80–99.9)
MCV RBC AUTO: 109.9 FL (ref 80–99.9)
MONOCYTES NFR BLD: 0.71 K/UL (ref 0.1–0.95)
MONOCYTES NFR BLD: 0.77 K/UL (ref 0.1–0.95)
MONOCYTES NFR BLD: 10 % (ref 2–12)
MONOCYTES NFR BLD: 9 % (ref 2–12)
NEUTROPHILS NFR BLD: 80 % (ref 43–80)
NEUTROPHILS NFR BLD: 81 % (ref 43–80)
NEUTS SEG NFR BLD: 6.01 K/UL (ref 1.8–7.3)
NEUTS SEG NFR BLD: 6.28 K/UL (ref 1.8–7.3)
PHOSPHATE SERPL-MCNC: 3.2 MG/DL (ref 2.5–4.5)
PHOSPHATE SERPL-MCNC: 3.2 MG/DL (ref 2.5–4.5)
PLATELET # BLD AUTO: 228 K/UL (ref 130–450)
PLATELET # BLD AUTO: 229 K/UL (ref 130–450)
PMV BLD AUTO: 10.3 FL (ref 7–12)
PMV BLD AUTO: 10.3 FL (ref 7–12)
POTASSIUM SERPL-SCNC: 3.9 MMOL/L (ref 3.5–5)
POTASSIUM SERPL-SCNC: 4.1 MMOL/L (ref 3.5–5)
PROT SERPL-MCNC: 6.3 G/DL (ref 6.4–8.3)
RBC # BLD AUTO: 2.53 M/UL (ref 3.8–5.8)
RBC # BLD AUTO: 2.55 M/UL (ref 3.8–5.8)
SODIUM SERPL-SCNC: 136 MMOL/L (ref 132–146)
SODIUM SERPL-SCNC: 137 MMOL/L (ref 132–146)
TROPONIN I SERPL HS-MCNC: 226 NG/L (ref 0–11)
WBC OTHER # BLD: 7.6 K/UL (ref 4.5–11.5)
WBC OTHER # BLD: 7.8 K/UL (ref 4.5–11.5)

## 2024-08-31 PROCEDURE — 6360000002 HC RX W HCPCS: Performed by: PHYSICIAN ASSISTANT

## 2024-08-31 PROCEDURE — 84100 ASSAY OF PHOSPHORUS: CPT

## 2024-08-31 PROCEDURE — 85025 COMPLETE CBC W/AUTO DIFF WBC: CPT

## 2024-08-31 PROCEDURE — 2580000003 HC RX 258: Performed by: PHYSICIAN ASSISTANT

## 2024-08-31 PROCEDURE — 84154 ASSAY OF PSA FREE: CPT

## 2024-08-31 PROCEDURE — 6370000000 HC RX 637 (ALT 250 FOR IP)

## 2024-08-31 PROCEDURE — 36415 COLL VENOUS BLD VENIPUNCTURE: CPT

## 2024-08-31 PROCEDURE — 83605 ASSAY OF LACTIC ACID: CPT

## 2024-08-31 PROCEDURE — 2140000000 HC CCU INTERMEDIATE R&B

## 2024-08-31 PROCEDURE — 84153 ASSAY OF PSA TOTAL: CPT

## 2024-08-31 PROCEDURE — 80048 BASIC METABOLIC PNL TOTAL CA: CPT

## 2024-08-31 PROCEDURE — 99231 SBSQ HOSP IP/OBS SF/LOW 25: CPT | Performed by: INTERNAL MEDICINE

## 2024-08-31 PROCEDURE — 6360000002 HC RX W HCPCS

## 2024-08-31 PROCEDURE — 83735 ASSAY OF MAGNESIUM: CPT

## 2024-08-31 PROCEDURE — 70450 CT HEAD/BRAIN W/O DYE: CPT

## 2024-08-31 PROCEDURE — 84484 ASSAY OF TROPONIN QUANT: CPT

## 2024-08-31 PROCEDURE — 82330 ASSAY OF CALCIUM: CPT

## 2024-08-31 PROCEDURE — 80053 COMPREHEN METABOLIC PANEL: CPT

## 2024-08-31 PROCEDURE — 6370000000 HC RX 637 (ALT 250 FOR IP): Performed by: PHYSICIAN ASSISTANT

## 2024-08-31 PROCEDURE — 2580000003 HC RX 258

## 2024-08-31 PROCEDURE — 71045 X-RAY EXAM CHEST 1 VIEW: CPT

## 2024-08-31 RX ORDER — SODIUM CHLORIDE 9 MG/ML
INJECTION, SOLUTION INTRAVENOUS CONTINUOUS
Status: DISCONTINUED | OUTPATIENT
Start: 2024-08-31 | End: 2024-09-01

## 2024-08-31 RX ORDER — CALCIUM GLUCONATE 10 MG/ML
1000 INJECTION, SOLUTION INTRAVENOUS ONCE
Status: COMPLETED | OUTPATIENT
Start: 2024-08-31 | End: 2024-08-31

## 2024-08-31 RX ORDER — HYDROCODONE BITARTRATE AND ACETAMINOPHEN 5; 325 MG/1; MG/1
1 TABLET ORAL EVERY 4 HOURS PRN
Qty: 18 TABLET | Refills: 0 | Status: SHIPPED | OUTPATIENT
Start: 2024-08-31 | End: 2024-09-03

## 2024-08-31 RX ORDER — PANTOPRAZOLE SODIUM 40 MG/1
40 TABLET, DELAYED RELEASE ORAL DAILY
Status: DISCONTINUED | OUTPATIENT
Start: 2024-08-31 | End: 2024-09-10 | Stop reason: HOSPADM

## 2024-08-31 RX ADMIN — ACETAMINOPHEN 650 MG: 325 TABLET ORAL at 06:46

## 2024-08-31 RX ADMIN — ACETAMINOPHEN 650 MG: 325 TABLET ORAL at 11:01

## 2024-08-31 RX ADMIN — CALCIUM GLUCONATE 1000 MG: 10 INJECTION, SOLUTION INTRAVENOUS at 11:01

## 2024-08-31 RX ADMIN — SODIUM CHLORIDE: 9 INJECTION, SOLUTION INTRAVENOUS at 01:04

## 2024-08-31 RX ADMIN — MUPIROCIN: 20 OINTMENT TOPICAL at 08:02

## 2024-08-31 RX ADMIN — SODIUM CHLORIDE, PRESERVATIVE FREE 10 ML: 5 INJECTION INTRAVENOUS at 08:04

## 2024-08-31 RX ADMIN — SODIUM CHLORIDE, PRESERVATIVE FREE 10 ML: 5 INJECTION INTRAVENOUS at 19:34

## 2024-08-31 RX ADMIN — PANTOPRAZOLE SODIUM 40 MG: 40 TABLET, DELAYED RELEASE ORAL at 18:23

## 2024-08-31 RX ADMIN — PROMETHAZINE HYDROCHLORIDE 12.5 MG: 12.5 TABLET ORAL at 12:37

## 2024-08-31 RX ADMIN — BACITRACIN ZINC: 500 OINTMENT TOPICAL at 08:04

## 2024-08-31 RX ADMIN — ENOXAPARIN SODIUM 30 MG: 100 INJECTION SUBCUTANEOUS at 08:02

## 2024-08-31 RX ADMIN — MUPIROCIN: 20 OINTMENT TOPICAL at 19:34

## 2024-08-31 ASSESSMENT — PAIN SCALES - GENERAL
PAINLEVEL_OUTOF10: 3
PAINLEVEL_OUTOF10: 4

## 2024-08-31 ASSESSMENT — PAIN DESCRIPTION - ORIENTATION: ORIENTATION: MID;INNER;RIGHT

## 2024-08-31 ASSESSMENT — PAIN - FUNCTIONAL ASSESSMENT: PAIN_FUNCTIONAL_ASSESSMENT: ACTIVITIES ARE NOT PREVENTED

## 2024-08-31 ASSESSMENT — PAIN DESCRIPTION - LOCATION: LOCATION: INCISION;CHEST;RIB CAGE

## 2024-08-31 ASSESSMENT — PAIN DESCRIPTION - DESCRIPTORS: DESCRIPTORS: ACHING;DISCOMFORT;SORE

## 2024-09-01 ENCOUNTER — APPOINTMENT (OUTPATIENT)
Dept: GENERAL RADIOLOGY | Age: 79
DRG: 981 | End: 2024-09-01
Attending: INTERNAL MEDICINE
Payer: MEDICARE

## 2024-09-01 LAB
ALBUMIN SERPL-MCNC: 2.9 G/DL (ref 3.5–5.2)
ALP SERPL-CCNC: 92 U/L (ref 40–129)
ALT SERPL-CCNC: <5 U/L (ref 0–40)
ANION GAP SERPL CALCULATED.3IONS-SCNC: 14 MMOL/L (ref 7–16)
AST SERPL-CCNC: 38 U/L (ref 0–39)
BASOPHILS # BLD: 0.01 K/UL (ref 0–0.2)
BASOPHILS NFR BLD: 0 % (ref 0–2)
BILIRUB SERPL-MCNC: 0.8 MG/DL (ref 0–1.2)
BNP SERPL-MCNC: ABNORMAL PG/ML (ref 0–450)
BUN SERPL-MCNC: 50 MG/DL (ref 6–23)
CALCIUM SERPL-MCNC: 7.9 MG/DL (ref 8.6–10.2)
CHLORIDE SERPL-SCNC: 106 MMOL/L (ref 98–107)
CO2 SERPL-SCNC: 15 MMOL/L (ref 22–29)
CREAT SERPL-MCNC: 3.8 MG/DL (ref 0.7–1.2)
CREAT UR-MCNC: 22.2 MG/DL (ref 40–278)
EOSINOPHIL # BLD: 0.01 K/UL (ref 0.05–0.5)
EOSINOPHILS RELATIVE PERCENT: 0 % (ref 0–6)
ERYTHROCYTE [DISTWIDTH] IN BLOOD BY AUTOMATED COUNT: 18.9 % (ref 11.5–15)
GFR, ESTIMATED: 15 ML/MIN/1.73M2
GLUCOSE SERPL-MCNC: 116 MG/DL (ref 74–99)
HCT VFR BLD AUTO: 27.5 % (ref 37–54)
HGB BLD-MCNC: 8.6 G/DL (ref 12.5–16.5)
IMM GRANULOCYTES # BLD AUTO: 0.06 K/UL (ref 0–0.58)
IMM GRANULOCYTES NFR BLD: 1 % (ref 0–5)
LYMPHOCYTES NFR BLD: 0.46 K/UL (ref 1.5–4)
LYMPHOCYTES RELATIVE PERCENT: 6 % (ref 20–42)
MAGNESIUM SERPL-MCNC: 1.7 MG/DL (ref 1.6–2.6)
MCH RBC QN AUTO: 33.7 PG (ref 26–35)
MCHC RBC AUTO-ENTMCNC: 31.3 G/DL (ref 32–34.5)
MCV RBC AUTO: 107.8 FL (ref 80–99.9)
MICROORGANISM SPEC CULT: NO GROWTH
MICROORGANISM SPEC CULT: NO GROWTH
MICROORGANISM/AGENT SPEC: NORMAL
MONOCYTES NFR BLD: 0.72 K/UL (ref 0.1–0.95)
MONOCYTES NFR BLD: 9 % (ref 2–12)
NEUTROPHILS NFR BLD: 85 % (ref 43–80)
NEUTS SEG NFR BLD: 7.06 K/UL (ref 1.8–7.3)
OSMOLALITY SERPL: 299 MOSM/KG (ref 285–310)
OSMOLALITY UR: 264 MOSM/KG (ref 300–900)
PHOSPHATE SERPL-MCNC: 3.3 MG/DL (ref 2.5–4.5)
PLATELET # BLD AUTO: 217 K/UL (ref 130–450)
PMV BLD AUTO: 10.4 FL (ref 7–12)
POTASSIUM SERPL-SCNC: 4.2 MMOL/L (ref 3.5–5)
PROT SERPL-MCNC: 6.5 G/DL (ref 6.4–8.3)
RBC # BLD AUTO: 2.55 M/UL (ref 3.8–5.8)
RBC # BLD: ABNORMAL 10*6/UL
SERVICE CMNT-IMP: NORMAL
SODIUM SERPL-SCNC: 135 MMOL/L (ref 132–146)
SODIUM UR-SCNC: 82 MMOL/L
SPECIMEN DESCRIPTION: NORMAL
SPECIMEN DESCRIPTION: NORMAL
UUN UR-MCNC: 181 MG/DL (ref 800–1666)
WBC OTHER # BLD: 8.3 K/UL (ref 4.5–11.5)

## 2024-09-01 PROCEDURE — 6360000002 HC RX W HCPCS: Performed by: PHYSICIAN ASSISTANT

## 2024-09-01 PROCEDURE — 84100 ASSAY OF PHOSPHORUS: CPT

## 2024-09-01 PROCEDURE — 82570 ASSAY OF URINE CREATININE: CPT

## 2024-09-01 PROCEDURE — 93005 ELECTROCARDIOGRAM TRACING: CPT | Performed by: INTERNAL MEDICINE

## 2024-09-01 PROCEDURE — 83735 ASSAY OF MAGNESIUM: CPT

## 2024-09-01 PROCEDURE — 6370000000 HC RX 637 (ALT 250 FOR IP)

## 2024-09-01 PROCEDURE — 80053 COMPREHEN METABOLIC PANEL: CPT

## 2024-09-01 PROCEDURE — 83930 ASSAY OF BLOOD OSMOLALITY: CPT

## 2024-09-01 PROCEDURE — 99232 SBSQ HOSP IP/OBS MODERATE 35: CPT | Performed by: INTERNAL MEDICINE

## 2024-09-01 PROCEDURE — 2580000003 HC RX 258: Performed by: PHYSICIAN ASSISTANT

## 2024-09-01 PROCEDURE — 85025 COMPLETE CBC W/AUTO DIFF WBC: CPT

## 2024-09-01 PROCEDURE — 83935 ASSAY OF URINE OSMOLALITY: CPT

## 2024-09-01 PROCEDURE — 6360000002 HC RX W HCPCS

## 2024-09-01 PROCEDURE — 84300 ASSAY OF URINE SODIUM: CPT

## 2024-09-01 PROCEDURE — 71045 X-RAY EXAM CHEST 1 VIEW: CPT

## 2024-09-01 PROCEDURE — 2140000000 HC CCU INTERMEDIATE R&B

## 2024-09-01 PROCEDURE — 83880 ASSAY OF NATRIURETIC PEPTIDE: CPT

## 2024-09-01 PROCEDURE — 6370000000 HC RX 637 (ALT 250 FOR IP): Performed by: PHYSICIAN ASSISTANT

## 2024-09-01 PROCEDURE — 84540 ASSAY OF URINE/UREA-N: CPT

## 2024-09-01 PROCEDURE — 36415 COLL VENOUS BLD VENIPUNCTURE: CPT

## 2024-09-01 RX ORDER — FUROSEMIDE 10 MG/ML
40 INJECTION INTRAMUSCULAR; INTRAVENOUS ONCE
Status: COMPLETED | OUTPATIENT
Start: 2024-09-01 | End: 2024-09-01

## 2024-09-01 RX ORDER — MAGNESIUM SULFATE IN WATER 40 MG/ML
2000 INJECTION, SOLUTION INTRAVENOUS ONCE
Status: COMPLETED | OUTPATIENT
Start: 2024-09-01 | End: 2024-09-01

## 2024-09-01 RX ADMIN — OXYCODONE HYDROCHLORIDE 5 MG: 5 TABLET ORAL at 20:05

## 2024-09-01 RX ADMIN — SODIUM CHLORIDE, PRESERVATIVE FREE 10 ML: 5 INJECTION INTRAVENOUS at 07:43

## 2024-09-01 RX ADMIN — FUROSEMIDE 40 MG: 10 INJECTION, SOLUTION INTRAMUSCULAR; INTRAVENOUS at 07:49

## 2024-09-01 RX ADMIN — MUPIROCIN: 20 OINTMENT TOPICAL at 07:50

## 2024-09-01 RX ADMIN — PANTOPRAZOLE SODIUM 40 MG: 40 TABLET, DELAYED RELEASE ORAL at 07:43

## 2024-09-01 RX ADMIN — SODIUM CHLORIDE, PRESERVATIVE FREE 10 ML: 5 INJECTION INTRAVENOUS at 20:06

## 2024-09-01 RX ADMIN — MAGNESIUM SULFATE HEPTAHYDRATE 2000 MG: 40 INJECTION, SOLUTION INTRAVENOUS at 20:42

## 2024-09-01 RX ADMIN — PROMETHAZINE HYDROCHLORIDE 12.5 MG: 12.5 TABLET ORAL at 07:50

## 2024-09-01 RX ADMIN — ENOXAPARIN SODIUM 30 MG: 100 INJECTION SUBCUTANEOUS at 07:43

## 2024-09-01 RX ADMIN — SENNOSIDES AND DOCUSATE SODIUM 1 TABLET: 50; 8.6 TABLET ORAL at 07:43

## 2024-09-01 ASSESSMENT — PAIN SCALES - GENERAL: PAINLEVEL_OUTOF10: 10

## 2024-09-01 ASSESSMENT — PAIN DESCRIPTION - DESCRIPTORS: DESCRIPTORS: ACHING;DISCOMFORT;SORE

## 2024-09-01 ASSESSMENT — PAIN DESCRIPTION - ORIENTATION: ORIENTATION: RIGHT

## 2024-09-01 ASSESSMENT — PAIN - FUNCTIONAL ASSESSMENT: PAIN_FUNCTIONAL_ASSESSMENT: ACTIVITIES ARE NOT PREVENTED

## 2024-09-01 ASSESSMENT — PAIN DESCRIPTION - LOCATION: LOCATION: RIB CAGE

## 2024-09-02 ENCOUNTER — APPOINTMENT (OUTPATIENT)
Dept: GENERAL RADIOLOGY | Age: 79
DRG: 981 | End: 2024-09-02
Attending: INTERNAL MEDICINE
Payer: MEDICARE

## 2024-09-02 LAB
ABO/RH: NORMAL
ALBUMIN SERPL-MCNC: 3.1 G/DL (ref 3.5–5.2)
ALP SERPL-CCNC: 93 U/L (ref 40–129)
ALT SERPL-CCNC: <5 U/L (ref 0–40)
ANION GAP SERPL CALCULATED.3IONS-SCNC: 17 MMOL/L (ref 7–16)
ANION GAP SERPL CALCULATED.3IONS-SCNC: 17 MMOL/L (ref 7–16)
ANTIBODY SCREEN: NEGATIVE
ARM BAND NUMBER: NORMAL
AST SERPL-CCNC: 29 U/L (ref 0–39)
B.E.: -8.2 MMOL/L (ref -3–3)
BASOPHILS # BLD: 0 K/UL (ref 0–0.2)
BASOPHILS NFR BLD: 0 % (ref 0–2)
BILIRUB SERPL-MCNC: 0.6 MG/DL (ref 0–1.2)
BLOOD BANK DISPENSE STATUS: NORMAL
BLOOD BANK SAMPLE EXPIRATION: NORMAL
BPU ID: NORMAL
BUN SERPL-MCNC: 57 MG/DL (ref 6–23)
BUN SERPL-MCNC: 62 MG/DL (ref 6–23)
CALCIUM SERPL-MCNC: 7.8 MG/DL (ref 8.6–10.2)
CALCIUM SERPL-MCNC: 7.9 MG/DL (ref 8.6–10.2)
CHLORIDE SERPL-SCNC: 101 MMOL/L (ref 98–107)
CHLORIDE SERPL-SCNC: 102 MMOL/L (ref 98–107)
CO2 SERPL-SCNC: 14 MMOL/L (ref 22–29)
CO2 SERPL-SCNC: 15 MMOL/L (ref 22–29)
COHB: 0.7 % (ref 0–1.5)
COMPONENT: NORMAL
CREAT SERPL-MCNC: 4.8 MG/DL (ref 0.7–1.2)
CREAT SERPL-MCNC: 5 MG/DL (ref 0.7–1.2)
CREAT UR-MCNC: 126.8 MG/DL (ref 40–278)
CREAT UR-MCNC: 128 MG/DL (ref 40–278)
CRITICAL: ABNORMAL
CROSSMATCH RESULT: NORMAL
DATE ANALYZED: ABNORMAL
DATE OF COLLECTION: ABNORMAL
EOSINOPHIL # BLD: 0.08 K/UL (ref 0.05–0.5)
EOSINOPHILS RELATIVE PERCENT: 1 % (ref 0–6)
ERYTHROCYTE [DISTWIDTH] IN BLOOD BY AUTOMATED COUNT: 18.5 % (ref 11.5–15)
GFR, ESTIMATED: 11 ML/MIN/1.73M2
GFR, ESTIMATED: 12 ML/MIN/1.73M2
GLUCOSE SERPL-MCNC: 118 MG/DL (ref 74–99)
GLUCOSE SERPL-MCNC: 119 MG/DL (ref 74–99)
HCO3: 14.7 MMOL/L (ref 22–26)
HCT VFR BLD AUTO: 25.1 % (ref 37–54)
HGB BLD-MCNC: 8.4 G/DL (ref 12.5–16.5)
HHB: 2.9 % (ref 0–5)
LAB: ABNORMAL
LYMPHOCYTES NFR BLD: 0.47 K/UL (ref 1.5–4)
LYMPHOCYTES RELATIVE PERCENT: 5 % (ref 20–42)
Lab: 1244
MAGNESIUM SERPL-MCNC: 2.2 MG/DL (ref 1.6–2.6)
MCH RBC QN AUTO: 35.7 PG (ref 26–35)
MCHC RBC AUTO-ENTMCNC: 33.5 G/DL (ref 32–34.5)
MCV RBC AUTO: 106.8 FL (ref 80–99.9)
METHB: 0.4 % (ref 0–1.5)
MICROALBUMIN UR-MCNC: 264 MG/L (ref 0–19)
MICROALBUMIN/CREAT UR-RTO: 208 MCG/MG CREAT (ref 0–30)
MODE: ABNORMAL
MONOCYTES NFR BLD: 0.86 K/UL (ref 0.1–0.95)
MONOCYTES NFR BLD: 10 % (ref 2–12)
NEUTROPHILS NFR BLD: 84 % (ref 43–80)
NEUTS SEG NFR BLD: 7.59 K/UL (ref 1.8–7.3)
O2 SATURATION: 97.1 % (ref 92–98.5)
O2HB: 96 % (ref 94–97)
OPERATOR ID: 8215
PATIENT TEMP: 37 C
PCO2: 22.5 MMHG (ref 35–45)
PH BLOOD GAS: 7.43 (ref 7.35–7.45)
PHOSPHATE SERPL-MCNC: 3.5 MG/DL (ref 2.5–4.5)
PLATELET # BLD AUTO: 216 K/UL (ref 130–450)
PMV BLD AUTO: 10.4 FL (ref 7–12)
PO2: 93.5 MMHG (ref 75–100)
POTASSIUM SERPL-SCNC: 4.2 MMOL/L (ref 3.5–5)
POTASSIUM SERPL-SCNC: 4.5 MMOL/L (ref 3.5–5)
PROT SERPL-MCNC: 6.8 G/DL (ref 6.4–8.3)
RBC # BLD AUTO: 2.35 M/UL (ref 3.8–5.8)
RBC # BLD: ABNORMAL 10*6/UL
SODIUM SERPL-SCNC: 132 MMOL/L (ref 132–146)
SODIUM SERPL-SCNC: 134 MMOL/L (ref 132–146)
SOURCE, BLOOD GAS: ABNORMAL
THB: 9.7 G/DL (ref 11.5–16.5)
TIME ANALYZED: 1250
TOTAL PROTEIN, URINE: 137 MG/DL (ref 0–12)
TRANSFUSION STATUS: NORMAL
UNIT DIVISION: 0
URINE TOTAL PROTEIN CREATININE RATIO: 1.07 (ref 0–0.2)
WBC OTHER # BLD: 9 K/UL (ref 4.5–11.5)

## 2024-09-02 PROCEDURE — 80074 ACUTE HEPATITIS PANEL: CPT

## 2024-09-02 PROCEDURE — 2580000003 HC RX 258: Performed by: PHYSICIAN ASSISTANT

## 2024-09-02 PROCEDURE — 86317 IMMUNOASSAY INFECTIOUS AGENT: CPT

## 2024-09-02 PROCEDURE — 80048 BASIC METABOLIC PNL TOTAL CA: CPT

## 2024-09-02 PROCEDURE — 51702 INSERT TEMP BLADDER CATH: CPT

## 2024-09-02 PROCEDURE — 6370000000 HC RX 637 (ALT 250 FOR IP): Performed by: PHYSICIAN ASSISTANT

## 2024-09-02 PROCEDURE — 99232 SBSQ HOSP IP/OBS MODERATE 35: CPT | Performed by: INTERNAL MEDICINE

## 2024-09-02 PROCEDURE — 36415 COLL VENOUS BLD VENIPUNCTURE: CPT

## 2024-09-02 PROCEDURE — 82043 UR ALBUMIN QUANTITATIVE: CPT

## 2024-09-02 PROCEDURE — 5A1D70Z PERFORMANCE OF URINARY FILTRATION, INTERMITTENT, LESS THAN 6 HOURS PER DAY: ICD-10-PCS

## 2024-09-02 PROCEDURE — 74018 RADEX ABDOMEN 1 VIEW: CPT

## 2024-09-02 PROCEDURE — 83735 ASSAY OF MAGNESIUM: CPT

## 2024-09-02 PROCEDURE — 02HV33Z INSERTION OF INFUSION DEVICE INTO SUPERIOR VENA CAVA, PERCUTANEOUS APPROACH: ICD-10-PCS | Performed by: STUDENT IN AN ORGANIZED HEALTH CARE EDUCATION/TRAINING PROGRAM

## 2024-09-02 PROCEDURE — 80053 COMPREHEN METABOLIC PANEL: CPT

## 2024-09-02 PROCEDURE — 82570 ASSAY OF URINE CREATININE: CPT

## 2024-09-02 PROCEDURE — 82805 BLOOD GASES W/O2 SATURATION: CPT

## 2024-09-02 PROCEDURE — 90935 HEMODIALYSIS ONE EVALUATION: CPT

## 2024-09-02 PROCEDURE — 6370000000 HC RX 637 (ALT 250 FOR IP)

## 2024-09-02 PROCEDURE — 84100 ASSAY OF PHOSPHORUS: CPT

## 2024-09-02 PROCEDURE — 84156 ASSAY OF PROTEIN URINE: CPT

## 2024-09-02 PROCEDURE — 85025 COMPLETE CBC W/AUTO DIFF WBC: CPT

## 2024-09-02 PROCEDURE — 72100 X-RAY EXAM L-S SPINE 2/3 VWS: CPT

## 2024-09-02 PROCEDURE — 6360000002 HC RX W HCPCS

## 2024-09-02 PROCEDURE — 2140000000 HC CCU INTERMEDIATE R&B

## 2024-09-02 PROCEDURE — 71045 X-RAY EXAM CHEST 1 VIEW: CPT

## 2024-09-02 RX ORDER — HEPARIN SODIUM 5000 [USP'U]/ML
5000 INJECTION, SOLUTION INTRAVENOUS; SUBCUTANEOUS EVERY 8 HOURS SCHEDULED
Status: DISCONTINUED | OUTPATIENT
Start: 2024-09-02 | End: 2024-09-10 | Stop reason: HOSPADM

## 2024-09-02 RX ADMIN — SENNOSIDES AND DOCUSATE SODIUM 1 TABLET: 50; 8.6 TABLET ORAL at 08:15

## 2024-09-02 RX ADMIN — HEPARIN SODIUM 5000 UNITS: 5000 INJECTION INTRAVENOUS; SUBCUTANEOUS at 16:01

## 2024-09-02 RX ADMIN — TIZANIDINE 4 MG: 4 TABLET ORAL at 08:15

## 2024-09-02 RX ADMIN — OXYCODONE HYDROCHLORIDE 5 MG: 5 TABLET ORAL at 20:46

## 2024-09-02 RX ADMIN — HEPARIN SODIUM 5000 UNITS: 5000 INJECTION INTRAVENOUS; SUBCUTANEOUS at 08:21

## 2024-09-02 RX ADMIN — PANTOPRAZOLE SODIUM 40 MG: 40 TABLET, DELAYED RELEASE ORAL at 08:15

## 2024-09-02 RX ADMIN — SODIUM CHLORIDE, PRESERVATIVE FREE 10 ML: 5 INJECTION INTRAVENOUS at 08:16

## 2024-09-02 RX ADMIN — ACETAMINOPHEN 650 MG: 325 TABLET ORAL at 03:45

## 2024-09-02 RX ADMIN — OXYCODONE HYDROCHLORIDE 5 MG: 5 TABLET ORAL at 00:54

## 2024-09-02 RX ADMIN — OXYCODONE HYDROCHLORIDE 5 MG: 5 TABLET ORAL at 12:48

## 2024-09-02 RX ADMIN — SODIUM CHLORIDE, PRESERVATIVE FREE 10 ML: 5 INJECTION INTRAVENOUS at 08:15

## 2024-09-02 RX ADMIN — OXYCODONE HYDROCHLORIDE 5 MG: 5 TABLET ORAL at 05:45

## 2024-09-02 ASSESSMENT — PAIN - FUNCTIONAL ASSESSMENT
PAIN_FUNCTIONAL_ASSESSMENT: ACTIVITIES ARE NOT PREVENTED
PAIN_FUNCTIONAL_ASSESSMENT: PREVENTS OR INTERFERES SOME ACTIVE ACTIVITIES AND ADLS
PAIN_FUNCTIONAL_ASSESSMENT: PREVENTS OR INTERFERES SOME ACTIVE ACTIVITIES AND ADLS
PAIN_FUNCTIONAL_ASSESSMENT: ACTIVITIES ARE NOT PREVENTED
PAIN_FUNCTIONAL_ASSESSMENT: ACTIVITIES ARE NOT PREVENTED

## 2024-09-02 ASSESSMENT — PAIN DESCRIPTION - DESCRIPTORS
DESCRIPTORS: ACHING;DULL;NAGGING
DESCRIPTORS: ACHING;DISCOMFORT;SORE
DESCRIPTORS: ACHING;DULL;DISCOMFORT

## 2024-09-02 ASSESSMENT — PAIN DESCRIPTION - LOCATION
LOCATION: FLANK
LOCATION: BACK;FLANK

## 2024-09-02 ASSESSMENT — PAIN SCALES - GENERAL
PAINLEVEL_OUTOF10: 0
PAINLEVEL_OUTOF10: 7
PAINLEVEL_OUTOF10: 10
PAINLEVEL_OUTOF10: 7
PAINLEVEL_OUTOF10: 3

## 2024-09-02 ASSESSMENT — PAIN DESCRIPTION - ORIENTATION
ORIENTATION: RIGHT
ORIENTATION: RIGHT;LEFT
ORIENTATION: RIGHT
ORIENTATION: RIGHT
ORIENTATION: LEFT

## 2024-09-03 ENCOUNTER — APPOINTMENT (OUTPATIENT)
Age: 79
DRG: 981 | End: 2024-09-03
Attending: INTERNAL MEDICINE
Payer: MEDICARE

## 2024-09-03 LAB
ALBUMIN SERPL-MCNC: 3.3 G/DL (ref 3.5–5.2)
ALP SERPL-CCNC: 91 U/L (ref 40–129)
ALT SERPL-CCNC: <5 U/L (ref 0–40)
ANION GAP SERPL CALCULATED.3IONS-SCNC: 19 MMOL/L (ref 7–16)
AST SERPL-CCNC: 29 U/L (ref 0–39)
BASOPHILS # BLD: 0 K/UL (ref 0–0.2)
BASOPHILS NFR BLD: 0 % (ref 0–2)
BILIRUB SERPL-MCNC: 0.7 MG/DL (ref 0–1.2)
BUN SERPL-MCNC: 52 MG/DL (ref 6–23)
CALCIUM SERPL-MCNC: 8.3 MG/DL (ref 8.6–10.2)
CHLORIDE SERPL-SCNC: 98 MMOL/L (ref 98–107)
CO2 SERPL-SCNC: 17 MMOL/L (ref 22–29)
CREAT SERPL-MCNC: 4.6 MG/DL (ref 0.7–1.2)
ECHO BSA: 2.07 M2
ECHO LV EF PHYSICIAN: 50 %
EKG ATRIAL RATE: 87 BPM
EKG P AXIS: 32 DEGREES
EKG P-R INTERVAL: 200 MS
EKG Q-T INTERVAL: 416 MS
EKG QRS DURATION: 106 MS
EKG QTC CALCULATION (BAZETT): 500 MS
EKG R AXIS: -64 DEGREES
EKG T AXIS: 98 DEGREES
EKG VENTRICULAR RATE: 87 BPM
EOSINOPHIL # BLD: 0 K/UL (ref 0.05–0.5)
EOSINOPHILS RELATIVE PERCENT: 0 % (ref 0–6)
ERYTHROCYTE [DISTWIDTH] IN BLOOD BY AUTOMATED COUNT: 18.4 % (ref 11.5–15)
FERRITIN SERPL-MCNC: 974 NG/ML
FOLATE SERPL-MCNC: 11.5 NG/ML (ref 4.8–24.2)
GFR, ESTIMATED: 12 ML/MIN/1.73M2
GLUCOSE SERPL-MCNC: 103 MG/DL (ref 74–99)
HAV IGM SERPL QL IA: NONREACTIVE
HBV CORE IGM SERPL QL IA: NONREACTIVE
HBV SURFACE AB SERPL IA-ACNC: <3.1 MIU/ML (ref 0–9.99)
HBV SURFACE AG SERPL QL IA: NONREACTIVE
HCT VFR BLD AUTO: 26.1 % (ref 37–54)
HCV AB SERPL QL IA: NONREACTIVE
HGB BLD-MCNC: 8.6 G/DL (ref 12.5–16.5)
IRON SATN MFR SERPL: 18 % (ref 20–55)
IRON SERPL-MCNC: 29 UG/DL (ref 59–158)
LYMPHOCYTES NFR BLD: 0.72 K/UL (ref 1.5–4)
LYMPHOCYTES RELATIVE PERCENT: 10 % (ref 20–42)
MAGNESIUM SERPL-MCNC: 2.2 MG/DL (ref 1.6–2.6)
MCH RBC QN AUTO: 35 PG (ref 26–35)
MCHC RBC AUTO-ENTMCNC: 33 G/DL (ref 32–34.5)
MCV RBC AUTO: 106.1 FL (ref 80–99.9)
MICROORGANISM SPEC CULT: NORMAL
MONOCYTES NFR BLD: 0.33 K/UL (ref 0.1–0.95)
MONOCYTES NFR BLD: 4 % (ref 2–12)
MYELOCYTES ABSOLUTE COUNT: 0.07 K/UL
MYELOCYTES: 1 %
NEUTROPHILS NFR BLD: 85 % (ref 43–80)
NEUTS SEG NFR BLD: 6.39 K/UL (ref 1.8–7.3)
NUCLEATED RED BLOOD CELLS: 1 PER 100 WBC
PHOSPHATE SERPL-MCNC: 4.4 MG/DL (ref 2.5–4.5)
PLATELET # BLD AUTO: 220 K/UL (ref 130–450)
PMV BLD AUTO: 10.5 FL (ref 7–12)
POTASSIUM SERPL-SCNC: 4.5 MMOL/L (ref 3.5–5)
PROSTATE SPECIFIC ANTIGEN FREE: <0.1 NG/ML
PROSTATE SPECIFIC ANTIGEN PERCENT FREE: NORMAL %
PROSTATE SPECIFIC ANTIGEN: <0.1 NG/ML (ref 0–4)
PROT SERPL-MCNC: 6.9 G/DL (ref 6.4–8.3)
RBC # BLD AUTO: 2.46 M/UL (ref 3.8–5.8)
RBC # BLD: ABNORMAL 10*6/UL
SERVICE CMNT-IMP: NORMAL
SODIUM SERPL-SCNC: 134 MMOL/L (ref 132–146)
SPECIMEN DESCRIPTION: NORMAL
TIBC SERPL-MCNC: 165 UG/DL (ref 250–450)
VIT B12 SERPL-MCNC: >2000 PG/ML (ref 211–946)
WBC OTHER # BLD: 7.5 K/UL (ref 4.5–11.5)

## 2024-09-03 PROCEDURE — 82607 VITAMIN B-12: CPT

## 2024-09-03 PROCEDURE — 6360000002 HC RX W HCPCS: Performed by: INTERNAL MEDICINE

## 2024-09-03 PROCEDURE — 93308 TTE F-UP OR LMTD: CPT

## 2024-09-03 PROCEDURE — 2140000000 HC CCU INTERMEDIATE R&B

## 2024-09-03 PROCEDURE — 90935 HEMODIALYSIS ONE EVALUATION: CPT

## 2024-09-03 PROCEDURE — 6370000000 HC RX 637 (ALT 250 FOR IP): Performed by: INTERNAL MEDICINE

## 2024-09-03 PROCEDURE — 2580000003 HC RX 258: Performed by: PHYSICIAN ASSISTANT

## 2024-09-03 PROCEDURE — 99223 1ST HOSP IP/OBS HIGH 75: CPT | Performed by: INTERNAL MEDICINE

## 2024-09-03 PROCEDURE — 36415 COLL VENOUS BLD VENIPUNCTURE: CPT

## 2024-09-03 PROCEDURE — 82728 ASSAY OF FERRITIN: CPT

## 2024-09-03 PROCEDURE — 6370000000 HC RX 637 (ALT 250 FOR IP): Performed by: NURSE PRACTITIONER

## 2024-09-03 PROCEDURE — 6360000002 HC RX W HCPCS

## 2024-09-03 PROCEDURE — 84100 ASSAY OF PHOSPHORUS: CPT

## 2024-09-03 PROCEDURE — 80053 COMPREHEN METABOLIC PANEL: CPT

## 2024-09-03 PROCEDURE — 99231 SBSQ HOSP IP/OBS SF/LOW 25: CPT | Performed by: INTERNAL MEDICINE

## 2024-09-03 PROCEDURE — 85025 COMPLETE CBC W/AUTO DIFF WBC: CPT

## 2024-09-03 PROCEDURE — 83540 ASSAY OF IRON: CPT

## 2024-09-03 PROCEDURE — 2580000003 HC RX 258: Performed by: INTERNAL MEDICINE

## 2024-09-03 PROCEDURE — 93308 TTE F-UP OR LMTD: CPT | Performed by: INTERNAL MEDICINE

## 2024-09-03 PROCEDURE — 82746 ASSAY OF FOLIC ACID SERUM: CPT

## 2024-09-03 PROCEDURE — 6370000000 HC RX 637 (ALT 250 FOR IP)

## 2024-09-03 PROCEDURE — 83550 IRON BINDING TEST: CPT

## 2024-09-03 PROCEDURE — 6370000000 HC RX 637 (ALT 250 FOR IP): Performed by: PHYSICIAN ASSISTANT

## 2024-09-03 PROCEDURE — 83735 ASSAY OF MAGNESIUM: CPT

## 2024-09-03 PROCEDURE — 93325 DOPPLER ECHO COLOR FLOW MAPG: CPT | Performed by: INTERNAL MEDICINE

## 2024-09-03 RX ORDER — METOPROLOL SUCCINATE 25 MG/1
25 TABLET, EXTENDED RELEASE ORAL DAILY
Status: DISCONTINUED | OUTPATIENT
Start: 2024-09-03 | End: 2024-09-10 | Stop reason: HOSPADM

## 2024-09-03 RX ORDER — MIDODRINE HYDROCHLORIDE 2.5 MG/1
2.5 TABLET ORAL 2 TIMES DAILY WITH MEALS
Status: DISCONTINUED | OUTPATIENT
Start: 2024-09-03 | End: 2024-09-03

## 2024-09-03 RX ORDER — CALCIUM GLUCONATE 10 MG/ML
1000 INJECTION, SOLUTION INTRAVENOUS ONCE
Status: COMPLETED | OUTPATIENT
Start: 2024-09-03 | End: 2024-09-03

## 2024-09-03 RX ADMIN — SODIUM CHLORIDE, PRESERVATIVE FREE 10 ML: 5 INJECTION INTRAVENOUS at 21:58

## 2024-09-03 RX ADMIN — METOPROLOL SUCCINATE 25 MG: 25 TABLET, EXTENDED RELEASE ORAL at 16:40

## 2024-09-03 RX ADMIN — SENNOSIDES AND DOCUSATE SODIUM 1 TABLET: 50; 8.6 TABLET ORAL at 21:58

## 2024-09-03 RX ADMIN — SENNOSIDES AND DOCUSATE SODIUM 1 TABLET: 50; 8.6 TABLET ORAL at 00:04

## 2024-09-03 RX ADMIN — SODIUM CHLORIDE, PRESERVATIVE FREE 10 ML: 5 INJECTION INTRAVENOUS at 00:05

## 2024-09-03 RX ADMIN — HEPARIN SODIUM 5000 UNITS: 5000 INJECTION INTRAVENOUS; SUBCUTANEOUS at 21:58

## 2024-09-03 RX ADMIN — BACITRACIN ZINC: 500 OINTMENT TOPICAL at 07:45

## 2024-09-03 RX ADMIN — HEPARIN SODIUM 5000 UNITS: 5000 INJECTION INTRAVENOUS; SUBCUTANEOUS at 00:05

## 2024-09-03 RX ADMIN — SODIUM CHLORIDE 125 MG: 9 INJECTION, SOLUTION INTRAVENOUS at 16:47

## 2024-09-03 RX ADMIN — PANTOPRAZOLE SODIUM 40 MG: 40 TABLET, DELAYED RELEASE ORAL at 07:41

## 2024-09-03 RX ADMIN — OXYCODONE HYDROCHLORIDE 5 MG: 5 TABLET ORAL at 16:39

## 2024-09-03 RX ADMIN — SODIUM CHLORIDE, PRESERVATIVE FREE 10 ML: 5 INJECTION INTRAVENOUS at 07:42

## 2024-09-03 RX ADMIN — HEPARIN SODIUM 5000 UNITS: 5000 INJECTION INTRAVENOUS; SUBCUTANEOUS at 16:40

## 2024-09-03 RX ADMIN — SENNOSIDES AND DOCUSATE SODIUM 1 TABLET: 50; 8.6 TABLET ORAL at 07:41

## 2024-09-03 RX ADMIN — CALCIUM GLUCONATE 1000 MG: 10 INJECTION, SOLUTION INTRAVENOUS at 11:51

## 2024-09-03 RX ADMIN — MIDODRINE HYDROCHLORIDE 2.5 MG: 2.5 TABLET ORAL at 11:50

## 2024-09-03 RX ADMIN — OXYCODONE HYDROCHLORIDE 5 MG: 5 TABLET ORAL at 00:38

## 2024-09-03 RX ADMIN — BACITRACIN ZINC: 500 OINTMENT TOPICAL at 00:04

## 2024-09-03 RX ADMIN — HEPARIN SODIUM 5000 UNITS: 5000 INJECTION INTRAVENOUS; SUBCUTANEOUS at 04:28

## 2024-09-03 ASSESSMENT — PAIN - FUNCTIONAL ASSESSMENT
PAIN_FUNCTIONAL_ASSESSMENT: PREVENTS OR INTERFERES SOME ACTIVE ACTIVITIES AND ADLS
PAIN_FUNCTIONAL_ASSESSMENT: PREVENTS OR INTERFERES SOME ACTIVE ACTIVITIES AND ADLS

## 2024-09-03 ASSESSMENT — PAIN DESCRIPTION - ORIENTATION
ORIENTATION: RIGHT;LEFT
ORIENTATION: RIGHT;LEFT;LOWER

## 2024-09-03 ASSESSMENT — PAIN SCALES - GENERAL
PAINLEVEL_OUTOF10: 10
PAINLEVEL_OUTOF10: 0
PAINLEVEL_OUTOF10: 7
PAINLEVEL_OUTOF10: 0

## 2024-09-03 ASSESSMENT — PAIN DESCRIPTION - DESCRIPTORS
DESCRIPTORS: ACHING;DISCOMFORT;NAGGING
DESCRIPTORS: ACHING;DISCOMFORT;SORE;THROBBING

## 2024-09-03 ASSESSMENT — PAIN DESCRIPTION - LOCATION
LOCATION: BACK
LOCATION: BACK

## 2024-09-04 ENCOUNTER — APPOINTMENT (OUTPATIENT)
Dept: GENERAL RADIOLOGY | Age: 79
DRG: 981 | End: 2024-09-04
Attending: INTERNAL MEDICINE
Payer: MEDICARE

## 2024-09-04 ENCOUNTER — APPOINTMENT (OUTPATIENT)
Dept: ULTRASOUND IMAGING | Age: 79
DRG: 981 | End: 2024-09-04
Attending: INTERNAL MEDICINE
Payer: MEDICARE

## 2024-09-04 ENCOUNTER — APPOINTMENT (OUTPATIENT)
Dept: CT IMAGING | Age: 79
DRG: 981 | End: 2024-09-04
Attending: INTERNAL MEDICINE
Payer: MEDICARE

## 2024-09-04 LAB
25(OH)D3 SERPL-MCNC: 32.1 NG/ML (ref 30–100)
ALBUMIN SERPL-MCNC: 3.1 G/DL (ref 3.5–5.2)
ALP SERPL-CCNC: 92 U/L (ref 40–129)
ALT SERPL-CCNC: <5 U/L (ref 0–40)
ANION GAP SERPL CALCULATED.3IONS-SCNC: 16 MMOL/L (ref 7–16)
AST SERPL-CCNC: 74 U/L (ref 0–39)
BASOPHILS # BLD: 0.02 K/UL (ref 0–0.2)
BASOPHILS NFR BLD: 0 % (ref 0–2)
BILIRUB SERPL-MCNC: 0.8 MG/DL (ref 0–1.2)
BUN SERPL-MCNC: 41 MG/DL (ref 6–23)
CA-I BLD-SCNC: 1.14 MMOL/L (ref 1.15–1.33)
CALCIUM SERPL-MCNC: 8.6 MG/DL (ref 8.6–10.2)
CHLORIDE SERPL-SCNC: 100 MMOL/L (ref 98–107)
CO2 SERPL-SCNC: 20 MMOL/L (ref 22–29)
CREAT SERPL-MCNC: 4.2 MG/DL (ref 0.7–1.2)
EKG ATRIAL RATE: 92 BPM
EKG P AXIS: 37 DEGREES
EKG P-R INTERVAL: 188 MS
EKG Q-T INTERVAL: 380 MS
EKG QRS DURATION: 98 MS
EKG QTC CALCULATION (BAZETT): 469 MS
EKG R AXIS: -58 DEGREES
EKG T AXIS: 98 DEGREES
EKG VENTRICULAR RATE: 92 BPM
EOSINOPHIL # BLD: 0.02 K/UL (ref 0.05–0.5)
EOSINOPHILS RELATIVE PERCENT: 0 % (ref 0–6)
ERYTHROCYTE [DISTWIDTH] IN BLOOD BY AUTOMATED COUNT: 18.2 % (ref 11.5–15)
GFR, ESTIMATED: 13 ML/MIN/1.73M2
GLUCOSE SERPL-MCNC: 121 MG/DL (ref 74–99)
HCT VFR BLD AUTO: 26.4 % (ref 37–54)
HGB BLD-MCNC: 8.4 G/DL (ref 12.5–16.5)
IMM GRANULOCYTES # BLD AUTO: 0.07 K/UL (ref 0–0.58)
IMM GRANULOCYTES NFR BLD: 1 % (ref 0–5)
INR PPP: 2.1
LYMPHOCYTES NFR BLD: 0.6 K/UL (ref 1.5–4)
LYMPHOCYTES RELATIVE PERCENT: 7 % (ref 20–42)
MAGNESIUM SERPL-MCNC: 2 MG/DL (ref 1.6–2.6)
MCH RBC QN AUTO: 33.6 PG (ref 26–35)
MCHC RBC AUTO-ENTMCNC: 31.8 G/DL (ref 32–34.5)
MCV RBC AUTO: 105.6 FL (ref 80–99.9)
MONOCYTES NFR BLD: 0.96 K/UL (ref 0.1–0.95)
MONOCYTES NFR BLD: 12 % (ref 2–12)
NEUTROPHILS NFR BLD: 80 % (ref 43–80)
NEUTS SEG NFR BLD: 6.7 K/UL (ref 1.8–7.3)
PHOSPHATE SERPL-MCNC: 3.7 MG/DL (ref 2.5–4.5)
PLATELET # BLD AUTO: 259 K/UL (ref 130–450)
PMV BLD AUTO: 10.2 FL (ref 7–12)
POTASSIUM SERPL-SCNC: 4.4 MMOL/L (ref 3.5–5)
PROT SERPL-MCNC: 6.8 G/DL (ref 6.4–8.3)
PROTHROMBIN TIME: 22.8 SEC (ref 9.3–12.4)
RBC # BLD AUTO: 2.5 M/UL (ref 3.8–5.8)
SODIUM SERPL-SCNC: 136 MMOL/L (ref 132–146)
WBC OTHER # BLD: 8.4 K/UL (ref 4.5–11.5)

## 2024-09-04 PROCEDURE — 76770 US EXAM ABDO BACK WALL COMP: CPT

## 2024-09-04 PROCEDURE — 6360000002 HC RX W HCPCS

## 2024-09-04 PROCEDURE — 2140000000 HC CCU INTERMEDIATE R&B

## 2024-09-04 PROCEDURE — 85025 COMPLETE CBC W/AUTO DIFF WBC: CPT

## 2024-09-04 PROCEDURE — 36415 COLL VENOUS BLD VENIPUNCTURE: CPT

## 2024-09-04 PROCEDURE — 6360000002 HC RX W HCPCS: Performed by: INTERNAL MEDICINE

## 2024-09-04 PROCEDURE — 74176 CT ABD & PELVIS W/O CONTRAST: CPT

## 2024-09-04 PROCEDURE — 2580000003 HC RX 258: Performed by: PHYSICIAN ASSISTANT

## 2024-09-04 PROCEDURE — 82306 VITAMIN D 25 HYDROXY: CPT

## 2024-09-04 PROCEDURE — 82330 ASSAY OF CALCIUM: CPT

## 2024-09-04 PROCEDURE — 6370000000 HC RX 637 (ALT 250 FOR IP)

## 2024-09-04 PROCEDURE — 90935 HEMODIALYSIS ONE EVALUATION: CPT

## 2024-09-04 PROCEDURE — 71045 X-RAY EXAM CHEST 1 VIEW: CPT

## 2024-09-04 PROCEDURE — 80053 COMPREHEN METABOLIC PANEL: CPT

## 2024-09-04 PROCEDURE — 99231 SBSQ HOSP IP/OBS SF/LOW 25: CPT | Performed by: INTERNAL MEDICINE

## 2024-09-04 PROCEDURE — 2580000003 HC RX 258: Performed by: INTERNAL MEDICINE

## 2024-09-04 PROCEDURE — 6370000000 HC RX 637 (ALT 250 FOR IP): Performed by: PHYSICIAN ASSISTANT

## 2024-09-04 PROCEDURE — 83735 ASSAY OF MAGNESIUM: CPT

## 2024-09-04 PROCEDURE — 84100 ASSAY OF PHOSPHORUS: CPT

## 2024-09-04 PROCEDURE — 6370000000 HC RX 637 (ALT 250 FOR IP): Performed by: NURSE PRACTITIONER

## 2024-09-04 PROCEDURE — 85610 PROTHROMBIN TIME: CPT

## 2024-09-04 RX ADMIN — HEPARIN SODIUM 5000 UNITS: 5000 INJECTION INTRAVENOUS; SUBCUTANEOUS at 21:23

## 2024-09-04 RX ADMIN — SENNOSIDES AND DOCUSATE SODIUM 1 TABLET: 50; 8.6 TABLET ORAL at 08:56

## 2024-09-04 RX ADMIN — METOPROLOL SUCCINATE 25 MG: 25 TABLET, EXTENDED RELEASE ORAL at 08:56

## 2024-09-04 RX ADMIN — SODIUM CHLORIDE, PRESERVATIVE FREE 10 ML: 5 INJECTION INTRAVENOUS at 21:22

## 2024-09-04 RX ADMIN — HEPARIN SODIUM 5000 UNITS: 5000 INJECTION INTRAVENOUS; SUBCUTANEOUS at 17:41

## 2024-09-04 RX ADMIN — SODIUM CHLORIDE, PRESERVATIVE FREE 10 ML: 5 INJECTION INTRAVENOUS at 09:06

## 2024-09-04 RX ADMIN — HEPARIN SODIUM 5000 UNITS: 5000 INJECTION INTRAVENOUS; SUBCUTANEOUS at 04:04

## 2024-09-04 RX ADMIN — OXYCODONE HYDROCHLORIDE 5 MG: 5 TABLET ORAL at 11:27

## 2024-09-04 RX ADMIN — PANTOPRAZOLE SODIUM 40 MG: 40 TABLET, DELAYED RELEASE ORAL at 08:57

## 2024-09-04 RX ADMIN — SODIUM CHLORIDE, PRESERVATIVE FREE 10 ML: 5 INJECTION INTRAVENOUS at 08:57

## 2024-09-04 RX ADMIN — SENNOSIDES AND DOCUSATE SODIUM 1 TABLET: 50; 8.6 TABLET ORAL at 21:23

## 2024-09-04 RX ADMIN — OXYCODONE HYDROCHLORIDE 5 MG: 5 TABLET ORAL at 04:04

## 2024-09-04 ASSESSMENT — PAIN DESCRIPTION - LOCATION
LOCATION: RIB CAGE;BACK
LOCATION: ABDOMEN

## 2024-09-04 ASSESSMENT — PAIN SCALES - GENERAL
PAINLEVEL_OUTOF10: 7
PAINLEVEL_OUTOF10: 7

## 2024-09-04 ASSESSMENT — PAIN DESCRIPTION - DESCRIPTORS
DESCRIPTORS: ACHING;DISCOMFORT;NAGGING
DESCRIPTORS: ACHING;DISCOMFORT

## 2024-09-04 ASSESSMENT — PAIN DESCRIPTION - ORIENTATION
ORIENTATION: RIGHT
ORIENTATION: RIGHT

## 2024-09-05 ENCOUNTER — APPOINTMENT (OUTPATIENT)
Dept: INTERVENTIONAL RADIOLOGY/VASCULAR | Age: 79
DRG: 981 | End: 2024-09-05
Attending: INTERNAL MEDICINE
Payer: MEDICARE

## 2024-09-05 LAB
ALBUMIN SERPL-MCNC: 3.1 G/DL (ref 3.5–5.2)
ALP SERPL-CCNC: 84 U/L (ref 40–129)
ALT SERPL-CCNC: <5 U/L (ref 0–40)
ANION GAP SERPL CALCULATED.3IONS-SCNC: 17 MMOL/L (ref 7–16)
AST SERPL-CCNC: 63 U/L (ref 0–39)
BASOPHILS # BLD: 0 K/UL (ref 0–0.2)
BASOPHILS NFR BLD: 0 % (ref 0–2)
BILIRUB SERPL-MCNC: 0.9 MG/DL (ref 0–1.2)
BNP SERPL-MCNC: ABNORMAL PG/ML (ref 0–450)
BUN SERPL-MCNC: 35 MG/DL (ref 6–23)
CA-I BLD-SCNC: 1.15 MMOL/L (ref 1.15–1.33)
CALCIUM SERPL-MCNC: 8.8 MG/DL (ref 8.6–10.2)
CHLORIDE SERPL-SCNC: 99 MMOL/L (ref 98–107)
CO2 SERPL-SCNC: 20 MMOL/L (ref 22–29)
CREAT SERPL-MCNC: 4 MG/DL (ref 0.7–1.2)
EOSINOPHIL # BLD: 0.07 K/UL (ref 0.05–0.5)
EOSINOPHILS RELATIVE PERCENT: 1 % (ref 0–6)
ERYTHROCYTE [DISTWIDTH] IN BLOOD BY AUTOMATED COUNT: 18.1 % (ref 11.5–15)
GFR, ESTIMATED: 14 ML/MIN/1.73M2
GLUCOSE SERPL-MCNC: 131 MG/DL (ref 74–99)
HCT VFR BLD AUTO: 25.7 % (ref 37–54)
HGB BLD-MCNC: 8.4 G/DL (ref 12.5–16.5)
INR PPP: 2.3
LYMPHOCYTES NFR BLD: 0.34 K/UL (ref 1.5–4)
LYMPHOCYTES RELATIVE PERCENT: 4 % (ref 20–42)
MAGNESIUM SERPL-MCNC: 2.2 MG/DL (ref 1.6–2.6)
MCH RBC QN AUTO: 34.7 PG (ref 26–35)
MCHC RBC AUTO-ENTMCNC: 32.7 G/DL (ref 32–34.5)
MCV RBC AUTO: 106.2 FL (ref 80–99.9)
MONOCYTES NFR BLD: 0.55 K/UL (ref 0.1–0.95)
MONOCYTES NFR BLD: 7 % (ref 2–12)
NEUTROPHILS NFR BLD: 88 % (ref 43–80)
NEUTS SEG NFR BLD: 7.03 K/UL (ref 1.8–7.3)
PHOSPHATE SERPL-MCNC: 3.3 MG/DL (ref 2.5–4.5)
PLATELET # BLD AUTO: 217 K/UL (ref 130–450)
PMV BLD AUTO: 10.5 FL (ref 7–12)
POTASSIUM SERPL-SCNC: 4.3 MMOL/L (ref 3.5–5)
PROT SERPL-MCNC: 6.6 G/DL (ref 6.4–8.3)
PROTHROMBIN TIME: 25.4 SEC (ref 9.3–12.4)
RBC # BLD AUTO: 2.42 M/UL (ref 3.8–5.8)
RBC # BLD: ABNORMAL 10*6/UL
SODIUM SERPL-SCNC: 136 MMOL/L (ref 132–146)
SURGICAL PATHOLOGY REPORT: NORMAL
WBC OTHER # BLD: 8 K/UL (ref 4.5–11.5)

## 2024-09-05 PROCEDURE — 77001 FLUOROGUIDE FOR VEIN DEVICE: CPT

## 2024-09-05 PROCEDURE — 02HV33Z INSERTION OF INFUSION DEVICE INTO SUPERIOR VENA CAVA, PERCUTANEOUS APPROACH: ICD-10-PCS | Performed by: RADIOLOGY

## 2024-09-05 PROCEDURE — 97165 OT EVAL LOW COMPLEX 30 MIN: CPT

## 2024-09-05 PROCEDURE — 6360000002 HC RX W HCPCS: Performed by: INTERNAL MEDICINE

## 2024-09-05 PROCEDURE — 6360000002 HC RX W HCPCS: Performed by: RADIOLOGY

## 2024-09-05 PROCEDURE — 82330 ASSAY OF CALCIUM: CPT

## 2024-09-05 PROCEDURE — 99231 SBSQ HOSP IP/OBS SF/LOW 25: CPT | Performed by: INTERNAL MEDICINE

## 2024-09-05 PROCEDURE — 36558 INSERT TUNNELED CV CATH: CPT

## 2024-09-05 PROCEDURE — 97161 PT EVAL LOW COMPLEX 20 MIN: CPT

## 2024-09-05 PROCEDURE — 2580000003 HC RX 258: Performed by: PHYSICIAN ASSISTANT

## 2024-09-05 PROCEDURE — 2500000003 HC RX 250 WO HCPCS: Performed by: RADIOLOGY

## 2024-09-05 PROCEDURE — 6370000000 HC RX 637 (ALT 250 FOR IP): Performed by: NURSE PRACTITIONER

## 2024-09-05 PROCEDURE — 97530 THERAPEUTIC ACTIVITIES: CPT

## 2024-09-05 PROCEDURE — 2140000000 HC CCU INTERMEDIATE R&B

## 2024-09-05 PROCEDURE — 97535 SELF CARE MNGMENT TRAINING: CPT

## 2024-09-05 PROCEDURE — 2580000003 HC RX 258: Performed by: RADIOLOGY

## 2024-09-05 PROCEDURE — C1894 INTRO/SHEATH, NON-LASER: HCPCS

## 2024-09-05 PROCEDURE — 90935 HEMODIALYSIS ONE EVALUATION: CPT

## 2024-09-05 PROCEDURE — 0JH63XZ INSERTION OF TUNNELED VASCULAR ACCESS DEVICE INTO CHEST SUBCUTANEOUS TISSUE AND FASCIA, PERCUTANEOUS APPROACH: ICD-10-PCS | Performed by: RADIOLOGY

## 2024-09-05 PROCEDURE — 85610 PROTHROMBIN TIME: CPT

## 2024-09-05 PROCEDURE — 83880 ASSAY OF NATRIURETIC PEPTIDE: CPT

## 2024-09-05 PROCEDURE — 85025 COMPLETE CBC W/AUTO DIFF WBC: CPT

## 2024-09-05 PROCEDURE — 6370000000 HC RX 637 (ALT 250 FOR IP)

## 2024-09-05 PROCEDURE — 6370000000 HC RX 637 (ALT 250 FOR IP): Performed by: PHYSICIAN ASSISTANT

## 2024-09-05 PROCEDURE — 83735 ASSAY OF MAGNESIUM: CPT

## 2024-09-05 PROCEDURE — 84100 ASSAY OF PHOSPHORUS: CPT

## 2024-09-05 PROCEDURE — 2700000000 HC OXYGEN THERAPY PER DAY

## 2024-09-05 PROCEDURE — 80053 COMPREHEN METABOLIC PANEL: CPT

## 2024-09-05 PROCEDURE — 76937 US GUIDE VASCULAR ACCESS: CPT

## 2024-09-05 PROCEDURE — 6360000002 HC RX W HCPCS

## 2024-09-05 PROCEDURE — 2580000003 HC RX 258: Performed by: INTERNAL MEDICINE

## 2024-09-05 PROCEDURE — 36415 COLL VENOUS BLD VENIPUNCTURE: CPT

## 2024-09-05 RX ORDER — MIDAZOLAM HYDROCHLORIDE 2 MG/2ML
INJECTION, SOLUTION INTRAMUSCULAR; INTRAVENOUS PRN
Status: COMPLETED | OUTPATIENT
Start: 2024-09-05 | End: 2024-09-05

## 2024-09-05 RX ORDER — LIDOCAINE HYDROCHLORIDE AND EPINEPHRINE BITARTRATE 20; .01 MG/ML; MG/ML
INJECTION, SOLUTION SUBCUTANEOUS PRN
Status: COMPLETED | OUTPATIENT
Start: 2024-09-05 | End: 2024-09-05

## 2024-09-05 RX ORDER — DIPHENHYDRAMINE HYDROCHLORIDE 50 MG/ML
INJECTION INTRAMUSCULAR; INTRAVENOUS PRN
Status: COMPLETED | OUTPATIENT
Start: 2024-09-05 | End: 2024-09-05

## 2024-09-05 RX ORDER — HEPARIN SODIUM 1000 [USP'U]/ML
INJECTION, SOLUTION INTRAVENOUS; SUBCUTANEOUS PRN
Status: COMPLETED | OUTPATIENT
Start: 2024-09-05 | End: 2024-09-05

## 2024-09-05 RX ORDER — FENTANYL CITRATE 50 UG/ML
INJECTION, SOLUTION INTRAMUSCULAR; INTRAVENOUS PRN
Status: COMPLETED | OUTPATIENT
Start: 2024-09-05 | End: 2024-09-05

## 2024-09-05 RX ORDER — LIDOCAINE HYDROCHLORIDE 20 MG/ML
INJECTION, SOLUTION INFILTRATION; PERINEURAL PRN
Status: COMPLETED | OUTPATIENT
Start: 2024-09-05 | End: 2024-09-05

## 2024-09-05 RX ADMIN — SENNOSIDES AND DOCUSATE SODIUM 1 TABLET: 50; 8.6 TABLET ORAL at 19:47

## 2024-09-05 RX ADMIN — HEPARIN SODIUM 5000 UNITS: 5000 INJECTION INTRAVENOUS; SUBCUTANEOUS at 05:42

## 2024-09-05 RX ADMIN — OXYCODONE HYDROCHLORIDE 5 MG: 5 TABLET ORAL at 20:05

## 2024-09-05 RX ADMIN — CEFAZOLIN 2000 MG: 2 INJECTION, POWDER, FOR SOLUTION INTRAMUSCULAR; INTRAVENOUS at 15:09

## 2024-09-05 RX ADMIN — OXYCODONE HYDROCHLORIDE 5 MG: 5 TABLET ORAL at 05:42

## 2024-09-05 RX ADMIN — HEPARIN SODIUM 5000 UNITS: 1000 INJECTION, SOLUTION INTRAVENOUS; SUBCUTANEOUS at 15:46

## 2024-09-05 RX ADMIN — LIDOCAINE HYDROCHLORIDE,EPINEPHRINE BITARTRATE 10 ML: 20; .01 INJECTION, SOLUTION INFILTRATION; PERINEURAL at 15:31

## 2024-09-05 RX ADMIN — SODIUM CHLORIDE 125 MG: 9 INJECTION, SOLUTION INTRAVENOUS at 07:55

## 2024-09-05 RX ADMIN — LIDOCAINE HYDROCHLORIDE 10 ML: 20 INJECTION, SOLUTION INFILTRATION; PERINEURAL at 15:31

## 2024-09-05 RX ADMIN — DIPHENHYDRAMINE HYDROCHLORIDE 25 MG: 50 INJECTION, SOLUTION INTRAMUSCULAR; INTRAVENOUS at 15:31

## 2024-09-05 RX ADMIN — METOPROLOL SUCCINATE 25 MG: 25 TABLET, EXTENDED RELEASE ORAL at 07:50

## 2024-09-05 RX ADMIN — OXYCODONE HYDROCHLORIDE 5 MG: 5 TABLET ORAL at 11:54

## 2024-09-05 RX ADMIN — SODIUM CHLORIDE, PRESERVATIVE FREE 10 ML: 5 INJECTION INTRAVENOUS at 07:50

## 2024-09-05 RX ADMIN — FENTANYL CITRATE 25 MCG: 50 INJECTION, SOLUTION INTRAMUSCULAR; INTRAVENOUS at 15:30

## 2024-09-05 RX ADMIN — SODIUM CHLORIDE, PRESERVATIVE FREE 10 ML: 5 INJECTION INTRAVENOUS at 19:47

## 2024-09-05 RX ADMIN — PANTOPRAZOLE SODIUM 40 MG: 40 TABLET, DELAYED RELEASE ORAL at 07:53

## 2024-09-05 RX ADMIN — BACITRACIN ZINC: 500 OINTMENT TOPICAL at 08:12

## 2024-09-05 RX ADMIN — MIDAZOLAM HYDROCHLORIDE 0.5 MG: 1 INJECTION, SOLUTION INTRAMUSCULAR; INTRAVENOUS at 15:29

## 2024-09-05 RX ADMIN — OXYCODONE HYDROCHLORIDE 5 MG: 5 TABLET ORAL at 00:33

## 2024-09-05 RX ADMIN — HEPARIN SODIUM 5000 UNITS: 5000 INJECTION INTRAVENOUS; SUBCUTANEOUS at 20:05

## 2024-09-05 RX ADMIN — SENNOSIDES AND DOCUSATE SODIUM 1 TABLET: 50; 8.6 TABLET ORAL at 07:50

## 2024-09-05 ASSESSMENT — PAIN DESCRIPTION - DESCRIPTORS
DESCRIPTORS: ACHING;SHARP;SORE
DESCRIPTORS: DISCOMFORT;TENDER;SORE

## 2024-09-05 ASSESSMENT — PAIN SCALES - GENERAL
PAINLEVEL_OUTOF10: 8
PAINLEVEL_OUTOF10: 0
PAINLEVEL_OUTOF10: 8
PAINLEVEL_OUTOF10: 0
PAINLEVEL_OUTOF10: 6
PAINLEVEL_OUTOF10: 0
PAINLEVEL_OUTOF10: 8
PAINLEVEL_OUTOF10: 0
PAINLEVEL_OUTOF10: 8

## 2024-09-05 ASSESSMENT — PAIN DESCRIPTION - ORIENTATION
ORIENTATION: RIGHT
ORIENTATION: RIGHT

## 2024-09-05 ASSESSMENT — PAIN DESCRIPTION - LOCATION
LOCATION: FLANK
LOCATION: FLANK

## 2024-09-06 LAB
ALBUMIN SERPL-MCNC: 3.1 G/DL (ref 3.5–5.2)
ALP SERPL-CCNC: 85 U/L (ref 40–129)
ALT SERPL-CCNC: <5 U/L (ref 0–40)
ANION GAP SERPL CALCULATED.3IONS-SCNC: 19 MMOL/L (ref 7–16)
AST SERPL-CCNC: 41 U/L (ref 0–39)
BASOPHILS # BLD: 0.02 K/UL (ref 0–0.2)
BASOPHILS NFR BLD: 0 % (ref 0–2)
BILIRUB SERPL-MCNC: 0.9 MG/DL (ref 0–1.2)
BUN SERPL-MCNC: 48 MG/DL (ref 6–23)
CA-I BLD-SCNC: 1.12 MMOL/L (ref 1.15–1.33)
CALCIUM SERPL-MCNC: 8.9 MG/DL (ref 8.6–10.2)
CHLORIDE SERPL-SCNC: 98 MMOL/L (ref 98–107)
CO2 SERPL-SCNC: 20 MMOL/L (ref 22–29)
CREAT SERPL-MCNC: 5.1 MG/DL (ref 0.7–1.2)
EOSINOPHIL # BLD: 0.04 K/UL (ref 0.05–0.5)
EOSINOPHILS RELATIVE PERCENT: 1 % (ref 0–6)
ERYTHROCYTE [DISTWIDTH] IN BLOOD BY AUTOMATED COUNT: 17.9 % (ref 11.5–15)
GFR, ESTIMATED: 11 ML/MIN/1.73M2
GLUCOSE SERPL-MCNC: 111 MG/DL (ref 74–99)
HCT VFR BLD AUTO: 27.8 % (ref 37–54)
HGB BLD-MCNC: 8.6 G/DL (ref 12.5–16.5)
IMM GRANULOCYTES # BLD AUTO: 0.04 K/UL (ref 0–0.58)
IMM GRANULOCYTES NFR BLD: 1 % (ref 0–5)
LYMPHOCYTES NFR BLD: 0.64 K/UL (ref 1.5–4)
LYMPHOCYTES RELATIVE PERCENT: 8 % (ref 20–42)
MAGNESIUM SERPL-MCNC: 2.3 MG/DL (ref 1.6–2.6)
MCH RBC QN AUTO: 33.6 PG (ref 26–35)
MCHC RBC AUTO-ENTMCNC: 30.9 G/DL (ref 32–34.5)
MCV RBC AUTO: 108.6 FL (ref 80–99.9)
MONOCYTES NFR BLD: 0.93 K/UL (ref 0.1–0.95)
MONOCYTES NFR BLD: 11 % (ref 2–12)
NEUTROPHILS NFR BLD: 80 % (ref 43–80)
NEUTS SEG NFR BLD: 6.79 K/UL (ref 1.8–7.3)
NON-GYN CYTOLOGY REPORT: NORMAL
PHOSPHATE SERPL-MCNC: 3.7 MG/DL (ref 2.5–4.5)
PLATELET # BLD AUTO: 214 K/UL (ref 130–450)
PMV BLD AUTO: 10.8 FL (ref 7–12)
POTASSIUM SERPL-SCNC: 4.6 MMOL/L (ref 3.5–5)
PROT SERPL-MCNC: 7.4 G/DL (ref 6.4–8.3)
RBC # BLD AUTO: 2.56 M/UL (ref 3.8–5.8)
SODIUM SERPL-SCNC: 137 MMOL/L (ref 132–146)
WBC OTHER # BLD: 8.5 K/UL (ref 4.5–11.5)

## 2024-09-06 PROCEDURE — 90935 HEMODIALYSIS ONE EVALUATION: CPT

## 2024-09-06 PROCEDURE — 80053 COMPREHEN METABOLIC PANEL: CPT

## 2024-09-06 PROCEDURE — 84154 ASSAY OF PSA FREE: CPT

## 2024-09-06 PROCEDURE — 6360000002 HC RX W HCPCS

## 2024-09-06 PROCEDURE — 6360000002 HC RX W HCPCS: Performed by: INTERNAL MEDICINE

## 2024-09-06 PROCEDURE — 2140000000 HC CCU INTERMEDIATE R&B

## 2024-09-06 PROCEDURE — 2700000000 HC OXYGEN THERAPY PER DAY

## 2024-09-06 PROCEDURE — 6370000000 HC RX 637 (ALT 250 FOR IP): Performed by: PHYSICIAN ASSISTANT

## 2024-09-06 PROCEDURE — 6370000000 HC RX 637 (ALT 250 FOR IP)

## 2024-09-06 PROCEDURE — 85025 COMPLETE CBC W/AUTO DIFF WBC: CPT

## 2024-09-06 PROCEDURE — 82330 ASSAY OF CALCIUM: CPT

## 2024-09-06 PROCEDURE — 83735 ASSAY OF MAGNESIUM: CPT

## 2024-09-06 PROCEDURE — 6370000000 HC RX 637 (ALT 250 FOR IP): Performed by: NURSE PRACTITIONER

## 2024-09-06 PROCEDURE — 99231 SBSQ HOSP IP/OBS SF/LOW 25: CPT | Performed by: INTERNAL MEDICINE

## 2024-09-06 PROCEDURE — 2580000003 HC RX 258: Performed by: INTERNAL MEDICINE

## 2024-09-06 PROCEDURE — 2580000003 HC RX 258: Performed by: PHYSICIAN ASSISTANT

## 2024-09-06 PROCEDURE — 36415 COLL VENOUS BLD VENIPUNCTURE: CPT

## 2024-09-06 PROCEDURE — 84100 ASSAY OF PHOSPHORUS: CPT

## 2024-09-06 RX ORDER — BISACODYL 5 MG/1
5 TABLET, DELAYED RELEASE ORAL DAILY
Status: DISCONTINUED | OUTPATIENT
Start: 2024-09-06 | End: 2024-09-10 | Stop reason: HOSPADM

## 2024-09-06 RX ADMIN — SENNOSIDES AND DOCUSATE SODIUM 1 TABLET: 50; 8.6 TABLET ORAL at 20:18

## 2024-09-06 RX ADMIN — HEPARIN SODIUM 5000 UNITS: 5000 INJECTION INTRAVENOUS; SUBCUTANEOUS at 21:27

## 2024-09-06 RX ADMIN — OXYCODONE HYDROCHLORIDE 5 MG: 5 TABLET ORAL at 21:27

## 2024-09-06 RX ADMIN — ACETAMINOPHEN 650 MG: 325 TABLET ORAL at 13:05

## 2024-09-06 RX ADMIN — SODIUM CHLORIDE, PRESERVATIVE FREE 10 ML: 5 INJECTION INTRAVENOUS at 08:20

## 2024-09-06 RX ADMIN — METOPROLOL SUCCINATE 25 MG: 25 TABLET, EXTENDED RELEASE ORAL at 08:25

## 2024-09-06 RX ADMIN — SODIUM CHLORIDE 125 MG: 9 INJECTION, SOLUTION INTRAVENOUS at 09:54

## 2024-09-06 RX ADMIN — BACITRACIN ZINC: 500 OINTMENT TOPICAL at 08:20

## 2024-09-06 RX ADMIN — SODIUM CHLORIDE, PRESERVATIVE FREE 10 ML: 5 INJECTION INTRAVENOUS at 20:18

## 2024-09-06 RX ADMIN — OXYCODONE HYDROCHLORIDE 5 MG: 5 TABLET ORAL at 09:52

## 2024-09-06 RX ADMIN — SENNOSIDES AND DOCUSATE SODIUM 1 TABLET: 50; 8.6 TABLET ORAL at 08:20

## 2024-09-06 RX ADMIN — HEPARIN SODIUM 5000 UNITS: 5000 INJECTION INTRAVENOUS; SUBCUTANEOUS at 06:47

## 2024-09-06 RX ADMIN — PANTOPRAZOLE SODIUM 40 MG: 40 TABLET, DELAYED RELEASE ORAL at 08:20

## 2024-09-06 RX ADMIN — BISACODYL 5 MG: 5 TABLET, COATED ORAL at 13:05

## 2024-09-06 ASSESSMENT — PAIN SCALES - GENERAL
PAINLEVEL_OUTOF10: 2
PAINLEVEL_OUTOF10: 1
PAINLEVEL_OUTOF10: 7
PAINLEVEL_OUTOF10: 3
PAINLEVEL_OUTOF10: 3
PAINLEVEL_OUTOF10: 7
PAINLEVEL_OUTOF10: 4

## 2024-09-06 ASSESSMENT — PAIN DESCRIPTION - ORIENTATION
ORIENTATION: RIGHT

## 2024-09-06 ASSESSMENT — PAIN - FUNCTIONAL ASSESSMENT
PAIN_FUNCTIONAL_ASSESSMENT: ACTIVITIES ARE NOT PREVENTED
PAIN_FUNCTIONAL_ASSESSMENT: PREVENTS OR INTERFERES SOME ACTIVE ACTIVITIES AND ADLS
PAIN_FUNCTIONAL_ASSESSMENT: PREVENTS OR INTERFERES SOME ACTIVE ACTIVITIES AND ADLS

## 2024-09-06 ASSESSMENT — PAIN DESCRIPTION - ONSET: ONSET: GRADUAL

## 2024-09-06 ASSESSMENT — PAIN DESCRIPTION - DESCRIPTORS
DESCRIPTORS: ACHING;DISCOMFORT;SORE;TENDER
DESCRIPTORS: ACHING;DISCOMFORT;THROBBING
DESCRIPTORS: ACHING;DISCOMFORT;THROBBING

## 2024-09-06 ASSESSMENT — PAIN DESCRIPTION - LOCATION
LOCATION: ABDOMEN;BACK
LOCATION: ABDOMEN;BACK
LOCATION: FLANK

## 2024-09-06 ASSESSMENT — PAIN DESCRIPTION - FREQUENCY: FREQUENCY: INTERMITTENT

## 2024-09-06 ASSESSMENT — PAIN DESCRIPTION - PAIN TYPE: TYPE: SURGICAL PAIN

## 2024-09-07 LAB
ALBUMIN SERPL-MCNC: 2.9 G/DL (ref 3.5–5.2)
ALP SERPL-CCNC: 91 U/L (ref 40–129)
ALT SERPL-CCNC: <5 U/L (ref 0–40)
ANION GAP SERPL CALCULATED.3IONS-SCNC: 12 MMOL/L (ref 7–16)
AST SERPL-CCNC: 31 U/L (ref 0–39)
BASOPHILS # BLD: 0 K/UL (ref 0–0.2)
BASOPHILS NFR BLD: 0 % (ref 0–2)
BILIRUB SERPL-MCNC: 0.7 MG/DL (ref 0–1.2)
BUN SERPL-MCNC: 40 MG/DL (ref 6–23)
CALCIUM SERPL-MCNC: 8.2 MG/DL (ref 8.6–10.2)
CHLORIDE SERPL-SCNC: 100 MMOL/L (ref 98–107)
CO2 SERPL-SCNC: 24 MMOL/L (ref 22–29)
CREAT SERPL-MCNC: 4.4 MG/DL (ref 0.7–1.2)
EOSINOPHIL # BLD: 0.06 K/UL (ref 0.05–0.5)
EOSINOPHILS RELATIVE PERCENT: 1 % (ref 0–6)
ERYTHROCYTE [DISTWIDTH] IN BLOOD BY AUTOMATED COUNT: 17.7 % (ref 11.5–15)
GFR, ESTIMATED: 13 ML/MIN/1.73M2
GLUCOSE SERPL-MCNC: 116 MG/DL (ref 74–99)
HCT VFR BLD AUTO: 26.4 % (ref 37–54)
HGB BLD-MCNC: 8.4 G/DL (ref 12.5–16.5)
LYMPHOCYTES NFR BLD: 0.6 K/UL (ref 1.5–4)
LYMPHOCYTES RELATIVE PERCENT: 9 % (ref 20–42)
MAGNESIUM SERPL-MCNC: 2 MG/DL (ref 1.6–2.6)
MCH RBC QN AUTO: 34.3 PG (ref 26–35)
MCHC RBC AUTO-ENTMCNC: 31.8 G/DL (ref 32–34.5)
MCV RBC AUTO: 107.8 FL (ref 80–99.9)
MONOCYTES NFR BLD: 0.36 K/UL (ref 0.1–0.95)
MONOCYTES NFR BLD: 5 % (ref 2–12)
NEUTROPHILS NFR BLD: 85 % (ref 43–80)
NEUTS SEG NFR BLD: 5.97 K/UL (ref 1.8–7.3)
NUCLEATED RED BLOOD CELLS: 1 PER 100 WBC
PHOSPHATE SERPL-MCNC: 2.7 MG/DL (ref 2.5–4.5)
PLATELET # BLD AUTO: 182 K/UL (ref 130–450)
PMV BLD AUTO: 11 FL (ref 7–12)
POTASSIUM SERPL-SCNC: 4.1 MMOL/L (ref 3.5–5)
PROT SERPL-MCNC: 6.7 G/DL (ref 6.4–8.3)
PSA FREE MFR SERPL: NORMAL %
PSA FREE SERPL-MCNC: <0.1 UG/L
PSA SERPL-MCNC: <0.03 NG/ML (ref 0–4)
RBC # BLD AUTO: 2.45 M/UL (ref 3.8–5.8)
RBC # BLD: ABNORMAL 10*6/UL
SODIUM SERPL-SCNC: 136 MMOL/L (ref 132–146)
WBC OTHER # BLD: 7 K/UL (ref 4.5–11.5)

## 2024-09-07 PROCEDURE — 36415 COLL VENOUS BLD VENIPUNCTURE: CPT

## 2024-09-07 PROCEDURE — 6370000000 HC RX 637 (ALT 250 FOR IP)

## 2024-09-07 PROCEDURE — 2140000000 HC CCU INTERMEDIATE R&B

## 2024-09-07 PROCEDURE — 2580000003 HC RX 258: Performed by: PHYSICIAN ASSISTANT

## 2024-09-07 PROCEDURE — 84100 ASSAY OF PHOSPHORUS: CPT

## 2024-09-07 PROCEDURE — 99232 SBSQ HOSP IP/OBS MODERATE 35: CPT | Performed by: INTERNAL MEDICINE

## 2024-09-07 PROCEDURE — 6360000002 HC RX W HCPCS

## 2024-09-07 PROCEDURE — 6370000000 HC RX 637 (ALT 250 FOR IP): Performed by: NURSE PRACTITIONER

## 2024-09-07 PROCEDURE — 85025 COMPLETE CBC W/AUTO DIFF WBC: CPT

## 2024-09-07 PROCEDURE — 83735 ASSAY OF MAGNESIUM: CPT

## 2024-09-07 PROCEDURE — 80053 COMPREHEN METABOLIC PANEL: CPT

## 2024-09-07 PROCEDURE — 6370000000 HC RX 637 (ALT 250 FOR IP): Performed by: PHYSICIAN ASSISTANT

## 2024-09-07 RX ADMIN — HEPARIN SODIUM 5000 UNITS: 5000 INJECTION INTRAVENOUS; SUBCUTANEOUS at 05:55

## 2024-09-07 RX ADMIN — SENNOSIDES AND DOCUSATE SODIUM 1 TABLET: 50; 8.6 TABLET ORAL at 08:49

## 2024-09-07 RX ADMIN — SODIUM CHLORIDE, PRESERVATIVE FREE 10 ML: 5 INJECTION INTRAVENOUS at 08:49

## 2024-09-07 RX ADMIN — BISACODYL 5 MG: 5 TABLET, COATED ORAL at 08:49

## 2024-09-07 RX ADMIN — HEPARIN SODIUM 5000 UNITS: 5000 INJECTION INTRAVENOUS; SUBCUTANEOUS at 14:20

## 2024-09-07 RX ADMIN — METOPROLOL SUCCINATE 25 MG: 25 TABLET, EXTENDED RELEASE ORAL at 08:49

## 2024-09-07 RX ADMIN — HEPARIN SODIUM 5000 UNITS: 5000 INJECTION INTRAVENOUS; SUBCUTANEOUS at 22:16

## 2024-09-07 RX ADMIN — SENNOSIDES AND DOCUSATE SODIUM 1 TABLET: 50; 8.6 TABLET ORAL at 20:20

## 2024-09-07 RX ADMIN — SODIUM CHLORIDE, PRESERVATIVE FREE 10 ML: 5 INJECTION INTRAVENOUS at 20:20

## 2024-09-07 RX ADMIN — PANTOPRAZOLE SODIUM 40 MG: 40 TABLET, DELAYED RELEASE ORAL at 08:49

## 2024-09-07 ASSESSMENT — PAIN SCALES - GENERAL
PAINLEVEL_OUTOF10: 0
PAINLEVEL_OUTOF10: 4
PAINLEVEL_OUTOF10: 4
PAINLEVEL_OUTOF10: 0
PAINLEVEL_OUTOF10: 0

## 2024-09-08 LAB
ALBUMIN SERPL-MCNC: 3 G/DL (ref 3.5–5.2)
ALP SERPL-CCNC: 79 U/L (ref 40–129)
ALT SERPL-CCNC: <5 U/L (ref 0–40)
ANION GAP SERPL CALCULATED.3IONS-SCNC: 17 MMOL/L (ref 7–16)
AST SERPL-CCNC: 29 U/L (ref 0–39)
BASOPHILS # BLD: 0 K/UL (ref 0–0.2)
BASOPHILS NFR BLD: 0 % (ref 0–2)
BILIRUB SERPL-MCNC: 0.7 MG/DL (ref 0–1.2)
BUN SERPL-MCNC: 52 MG/DL (ref 6–23)
CALCIUM SERPL-MCNC: 8 MG/DL (ref 8.6–10.2)
CHLORIDE SERPL-SCNC: 98 MMOL/L (ref 98–107)
CO2 SERPL-SCNC: 22 MMOL/L (ref 22–29)
CREAT SERPL-MCNC: 5.9 MG/DL (ref 0.7–1.2)
EOSINOPHIL # BLD: 0.05 K/UL (ref 0.05–0.5)
EOSINOPHILS RELATIVE PERCENT: 1 % (ref 0–6)
ERYTHROCYTE [DISTWIDTH] IN BLOOD BY AUTOMATED COUNT: 18.2 % (ref 11.5–15)
GFR, ESTIMATED: 9 ML/MIN/1.73M2
GLUCOSE SERPL-MCNC: 107 MG/DL (ref 74–99)
HCT VFR BLD AUTO: 25.1 % (ref 37–54)
HGB BLD-MCNC: 7.9 G/DL (ref 12.5–16.5)
LYMPHOCYTES NFR BLD: 0.52 K/UL (ref 1.5–4)
LYMPHOCYTES RELATIVE PERCENT: 9 % (ref 20–42)
MAGNESIUM SERPL-MCNC: 2.1 MG/DL (ref 1.6–2.6)
MCH RBC QN AUTO: 34.1 PG (ref 26–35)
MCHC RBC AUTO-ENTMCNC: 31.5 G/DL (ref 32–34.5)
MCV RBC AUTO: 108.2 FL (ref 80–99.9)
MONOCYTES NFR BLD: 0.47 K/UL (ref 0.1–0.95)
MONOCYTES NFR BLD: 8 % (ref 2–12)
MYELOCYTES ABSOLUTE COUNT: 0.05 K/UL
MYELOCYTES: 1 %
NEUTROPHILS NFR BLD: 82 % (ref 43–80)
NEUTS SEG NFR BLD: 4.9 K/UL (ref 1.8–7.3)
NUCLEATED RED BLOOD CELLS: 1 PER 100 WBC
PHOSPHATE SERPL-MCNC: 3 MG/DL (ref 2.5–4.5)
PLATELET # BLD AUTO: 187 K/UL (ref 130–450)
PMV BLD AUTO: 10.9 FL (ref 7–12)
POTASSIUM SERPL-SCNC: 4.1 MMOL/L (ref 3.5–5)
PROT SERPL-MCNC: 6.4 G/DL (ref 6.4–8.3)
RBC # BLD AUTO: 2.32 M/UL (ref 3.8–5.8)
RBC # BLD: ABNORMAL 10*6/UL
SODIUM SERPL-SCNC: 137 MMOL/L (ref 132–146)
WBC OTHER # BLD: 6 K/UL (ref 4.5–11.5)

## 2024-09-08 PROCEDURE — 2140000000 HC CCU INTERMEDIATE R&B

## 2024-09-08 PROCEDURE — 83735 ASSAY OF MAGNESIUM: CPT

## 2024-09-08 PROCEDURE — 99232 SBSQ HOSP IP/OBS MODERATE 35: CPT | Performed by: INTERNAL MEDICINE

## 2024-09-08 PROCEDURE — 6360000002 HC RX W HCPCS

## 2024-09-08 PROCEDURE — 6370000000 HC RX 637 (ALT 250 FOR IP)

## 2024-09-08 PROCEDURE — 6370000000 HC RX 637 (ALT 250 FOR IP): Performed by: INTERNAL MEDICINE

## 2024-09-08 PROCEDURE — 80053 COMPREHEN METABOLIC PANEL: CPT

## 2024-09-08 PROCEDURE — 84100 ASSAY OF PHOSPHORUS: CPT

## 2024-09-08 PROCEDURE — 85025 COMPLETE CBC W/AUTO DIFF WBC: CPT

## 2024-09-08 PROCEDURE — 6370000000 HC RX 637 (ALT 250 FOR IP): Performed by: PHYSICIAN ASSISTANT

## 2024-09-08 PROCEDURE — 36415 COLL VENOUS BLD VENIPUNCTURE: CPT

## 2024-09-08 PROCEDURE — 6370000000 HC RX 637 (ALT 250 FOR IP): Performed by: NURSE PRACTITIONER

## 2024-09-08 PROCEDURE — 2580000003 HC RX 258: Performed by: PHYSICIAN ASSISTANT

## 2024-09-08 RX ADMIN — ACETAMINOPHEN 650 MG: 325 TABLET ORAL at 12:44

## 2024-09-08 RX ADMIN — PANTOPRAZOLE SODIUM 40 MG: 40 TABLET, DELAYED RELEASE ORAL at 08:18

## 2024-09-08 RX ADMIN — HEPARIN SODIUM 5000 UNITS: 5000 INJECTION INTRAVENOUS; SUBCUTANEOUS at 16:38

## 2024-09-08 RX ADMIN — ACETAMINOPHEN 650 MG: 325 TABLET ORAL at 22:57

## 2024-09-08 RX ADMIN — SENNOSIDES AND DOCUSATE SODIUM 1 TABLET: 50; 8.6 TABLET ORAL at 08:18

## 2024-09-08 RX ADMIN — BISACODYL 5 MG: 5 TABLET, COATED ORAL at 08:18

## 2024-09-08 RX ADMIN — HEPARIN SODIUM 5000 UNITS: 5000 INJECTION INTRAVENOUS; SUBCUTANEOUS at 05:42

## 2024-09-08 RX ADMIN — PETROLATUM: 420 OINTMENT TOPICAL at 12:38

## 2024-09-08 RX ADMIN — SODIUM CHLORIDE, PRESERVATIVE FREE 10 ML: 5 INJECTION INTRAVENOUS at 21:12

## 2024-09-08 RX ADMIN — SENNOSIDES AND DOCUSATE SODIUM 1 TABLET: 50; 8.6 TABLET ORAL at 21:12

## 2024-09-08 RX ADMIN — SODIUM CHLORIDE, PRESERVATIVE FREE 10 ML: 5 INJECTION INTRAVENOUS at 08:18

## 2024-09-08 RX ADMIN — METOPROLOL SUCCINATE 25 MG: 25 TABLET, EXTENDED RELEASE ORAL at 08:18

## 2024-09-08 RX ADMIN — HEPARIN SODIUM 5000 UNITS: 5000 INJECTION INTRAVENOUS; SUBCUTANEOUS at 21:12

## 2024-09-08 ASSESSMENT — PAIN SCALES - GENERAL
PAINLEVEL_OUTOF10: 0
PAINLEVEL_OUTOF10: 5
PAINLEVEL_OUTOF10: 2
PAINLEVEL_OUTOF10: 3

## 2024-09-08 ASSESSMENT — PAIN DESCRIPTION - ORIENTATION: ORIENTATION: RIGHT

## 2024-09-08 ASSESSMENT — PAIN DESCRIPTION - DESCRIPTORS: DESCRIPTORS: ACHING;DISCOMFORT;SORE

## 2024-09-08 ASSESSMENT — PAIN DESCRIPTION - LOCATION: LOCATION: BACK

## 2024-09-09 LAB
ALBUMIN SERPL-MCNC: 3.1 G/DL (ref 3.5–5.2)
ALP SERPL-CCNC: 82 U/L (ref 40–129)
ALT SERPL-CCNC: <5 U/L (ref 0–40)
ANION GAP SERPL CALCULATED.3IONS-SCNC: 18 MMOL/L (ref 7–16)
AST SERPL-CCNC: 27 U/L (ref 0–39)
BASOPHILS # BLD: 0 K/UL (ref 0–0.2)
BASOPHILS NFR BLD: 0 % (ref 0–2)
BILIRUB SERPL-MCNC: 0.8 MG/DL (ref 0–1.2)
BUN SERPL-MCNC: 61 MG/DL (ref 6–23)
CALCIUM SERPL-MCNC: 8.1 MG/DL (ref 8.6–10.2)
CHLORIDE SERPL-SCNC: 97 MMOL/L (ref 98–107)
CO2 SERPL-SCNC: 22 MMOL/L (ref 22–29)
CREAT SERPL-MCNC: 6.8 MG/DL (ref 0.7–1.2)
EOSINOPHIL # BLD: 0.12 K/UL (ref 0.05–0.5)
EOSINOPHILS RELATIVE PERCENT: 2 % (ref 0–6)
ERYTHROCYTE [DISTWIDTH] IN BLOOD BY AUTOMATED COUNT: 18.1 % (ref 11.5–15)
GFR, ESTIMATED: 8 ML/MIN/1.73M2
GLUCOSE SERPL-MCNC: 125 MG/DL (ref 74–99)
HCT VFR BLD AUTO: 26.1 % (ref 37–54)
HGB BLD-MCNC: 8 G/DL (ref 12.5–16.5)
LYMPHOCYTES NFR BLD: 0.6 K/UL (ref 1.5–4)
LYMPHOCYTES RELATIVE PERCENT: 9 % (ref 20–42)
MAGNESIUM SERPL-MCNC: 2.1 MG/DL (ref 1.6–2.6)
MCH RBC QN AUTO: 33.3 PG (ref 26–35)
MCHC RBC AUTO-ENTMCNC: 30.7 G/DL (ref 32–34.5)
MCV RBC AUTO: 108.8 FL (ref 80–99.9)
MONOCYTES NFR BLD: 0.36 K/UL (ref 0.1–0.95)
MONOCYTES NFR BLD: 5 % (ref 2–12)
NEUTROPHILS NFR BLD: 84 % (ref 43–80)
NEUTS SEG NFR BLD: 5.82 K/UL (ref 1.8–7.3)
NUCLEATED RED BLOOD CELLS: 1 PER 100 WBC
PHOSPHATE SERPL-MCNC: 3.3 MG/DL (ref 2.5–4.5)
PLATELET # BLD AUTO: 199 K/UL (ref 130–450)
PMV BLD AUTO: 10.9 FL (ref 7–12)
POTASSIUM SERPL-SCNC: 4.3 MMOL/L (ref 3.5–5)
PROT SERPL-MCNC: 7 G/DL (ref 6.4–8.3)
RBC # BLD AUTO: 2.4 M/UL (ref 3.8–5.8)
RBC # BLD: ABNORMAL 10*6/UL
SODIUM SERPL-SCNC: 137 MMOL/L (ref 132–146)
WBC OTHER # BLD: 6.9 K/UL (ref 4.5–11.5)

## 2024-09-09 PROCEDURE — 99238 HOSP IP/OBS DSCHRG MGMT 30/<: CPT | Performed by: INTERNAL MEDICINE

## 2024-09-09 PROCEDURE — 6370000000 HC RX 637 (ALT 250 FOR IP)

## 2024-09-09 PROCEDURE — 83735 ASSAY OF MAGNESIUM: CPT

## 2024-09-09 PROCEDURE — 6370000000 HC RX 637 (ALT 250 FOR IP): Performed by: NURSE PRACTITIONER

## 2024-09-09 PROCEDURE — 36415 COLL VENOUS BLD VENIPUNCTURE: CPT

## 2024-09-09 PROCEDURE — 90935 HEMODIALYSIS ONE EVALUATION: CPT

## 2024-09-09 PROCEDURE — 6360000002 HC RX W HCPCS

## 2024-09-09 PROCEDURE — 84100 ASSAY OF PHOSPHORUS: CPT

## 2024-09-09 PROCEDURE — 2580000003 HC RX 258: Performed by: PHYSICIAN ASSISTANT

## 2024-09-09 PROCEDURE — 6370000000 HC RX 637 (ALT 250 FOR IP): Performed by: PHYSICIAN ASSISTANT

## 2024-09-09 PROCEDURE — 85025 COMPLETE CBC W/AUTO DIFF WBC: CPT

## 2024-09-09 PROCEDURE — 2140000000 HC CCU INTERMEDIATE R&B

## 2024-09-09 PROCEDURE — 80053 COMPREHEN METABOLIC PANEL: CPT

## 2024-09-09 RX ADMIN — SENNOSIDES AND DOCUSATE SODIUM 1 TABLET: 50; 8.6 TABLET ORAL at 09:45

## 2024-09-09 RX ADMIN — HEPARIN SODIUM 5000 UNITS: 5000 INJECTION INTRAVENOUS; SUBCUTANEOUS at 21:01

## 2024-09-09 RX ADMIN — SODIUM CHLORIDE, PRESERVATIVE FREE 10 ML: 5 INJECTION INTRAVENOUS at 09:49

## 2024-09-09 RX ADMIN — PANTOPRAZOLE SODIUM 40 MG: 40 TABLET, DELAYED RELEASE ORAL at 09:46

## 2024-09-09 RX ADMIN — BACITRACIN ZINC: 500 OINTMENT TOPICAL at 09:48

## 2024-09-09 RX ADMIN — SENNOSIDES AND DOCUSATE SODIUM 1 TABLET: 50; 8.6 TABLET ORAL at 21:02

## 2024-09-09 RX ADMIN — METOPROLOL SUCCINATE 25 MG: 25 TABLET, EXTENDED RELEASE ORAL at 09:45

## 2024-09-09 RX ADMIN — SODIUM CHLORIDE, PRESERVATIVE FREE 10 ML: 5 INJECTION INTRAVENOUS at 21:02

## 2024-09-09 RX ADMIN — HEPARIN SODIUM 5000 UNITS: 5000 INJECTION INTRAVENOUS; SUBCUTANEOUS at 06:00

## 2024-09-09 RX ADMIN — HEPARIN SODIUM 5000 UNITS: 5000 INJECTION INTRAVENOUS; SUBCUTANEOUS at 14:26

## 2024-09-09 RX ADMIN — BISACODYL 5 MG: 5 TABLET, COATED ORAL at 09:45

## 2024-09-09 ASSESSMENT — PAIN SCALES - GENERAL: PAINLEVEL_OUTOF10: 0

## 2024-09-10 VITALS
TEMPERATURE: 96.9 F | HEART RATE: 84 BPM | RESPIRATION RATE: 16 BRPM | DIASTOLIC BLOOD PRESSURE: 75 MMHG | BODY MASS INDEX: 30.31 KG/M2 | SYSTOLIC BLOOD PRESSURE: 108 MMHG | HEIGHT: 67 IN | WEIGHT: 193.12 LBS | OXYGEN SATURATION: 100 %

## 2024-09-10 LAB
ALBUMIN SERPL-MCNC: 3.1 G/DL (ref 3.5–5.2)
ALP SERPL-CCNC: 85 U/L (ref 40–129)
ALT SERPL-CCNC: <5 U/L (ref 0–40)
ANION GAP SERPL CALCULATED.3IONS-SCNC: 17 MMOL/L (ref 7–16)
AST SERPL-CCNC: 28 U/L (ref 0–39)
BASOPHILS # BLD: 0.04 K/UL (ref 0–0.2)
BASOPHILS NFR BLD: 1 % (ref 0–2)
BILIRUB SERPL-MCNC: 0.7 MG/DL (ref 0–1.2)
BUN SERPL-MCNC: 46 MG/DL (ref 6–23)
CALCIUM SERPL-MCNC: 7.8 MG/DL (ref 8.6–10.2)
CHLORIDE SERPL-SCNC: 98 MMOL/L (ref 98–107)
CO2 SERPL-SCNC: 22 MMOL/L (ref 22–29)
CREAT SERPL-MCNC: 5.7 MG/DL (ref 0.7–1.2)
EOSINOPHIL # BLD: 0.11 K/UL (ref 0.05–0.5)
EOSINOPHILS RELATIVE PERCENT: 2 % (ref 0–6)
ERYTHROCYTE [DISTWIDTH] IN BLOOD BY AUTOMATED COUNT: 18.2 % (ref 11.5–15)
GFR, ESTIMATED: 9 ML/MIN/1.73M2
GLUCOSE SERPL-MCNC: 102 MG/DL (ref 74–99)
HCT VFR BLD AUTO: 24.5 % (ref 37–54)
HGB BLD-MCNC: 7.8 G/DL (ref 12.5–16.5)
IMM GRANULOCYTES # BLD AUTO: 0.06 K/UL (ref 0–0.58)
IMM GRANULOCYTES NFR BLD: 1 % (ref 0–5)
LYMPHOCYTES NFR BLD: 0.52 K/UL (ref 1.5–4)
LYMPHOCYTES RELATIVE PERCENT: 8 % (ref 20–42)
MAGNESIUM SERPL-MCNC: 2 MG/DL (ref 1.6–2.6)
MCH RBC QN AUTO: 34.2 PG (ref 26–35)
MCHC RBC AUTO-ENTMCNC: 31.8 G/DL (ref 32–34.5)
MCV RBC AUTO: 107.5 FL (ref 80–99.9)
MONOCYTES NFR BLD: 0.52 K/UL (ref 0.1–0.95)
MONOCYTES NFR BLD: 8 % (ref 2–12)
NEUTROPHILS NFR BLD: 82 % (ref 43–80)
NEUTS SEG NFR BLD: 5.7 K/UL (ref 1.8–7.3)
PHOSPHATE SERPL-MCNC: 3.1 MG/DL (ref 2.5–4.5)
PLATELET # BLD AUTO: 190 K/UL (ref 130–450)
PMV BLD AUTO: 11 FL (ref 7–12)
POTASSIUM SERPL-SCNC: 4.4 MMOL/L (ref 3.5–5)
PROT SERPL-MCNC: 6.6 G/DL (ref 6.4–8.3)
RBC # BLD AUTO: 2.28 M/UL (ref 3.8–5.8)
RBC # BLD: ABNORMAL 10*6/UL
SODIUM SERPL-SCNC: 137 MMOL/L (ref 132–146)
WBC OTHER # BLD: 7 K/UL (ref 4.5–11.5)

## 2024-09-10 PROCEDURE — 83735 ASSAY OF MAGNESIUM: CPT

## 2024-09-10 PROCEDURE — 2580000003 HC RX 258: Performed by: PHYSICIAN ASSISTANT

## 2024-09-10 PROCEDURE — 6360000002 HC RX W HCPCS: Performed by: INTERNAL MEDICINE

## 2024-09-10 PROCEDURE — 84100 ASSAY OF PHOSPHORUS: CPT

## 2024-09-10 PROCEDURE — 6370000000 HC RX 637 (ALT 250 FOR IP): Performed by: PHYSICIAN ASSISTANT

## 2024-09-10 PROCEDURE — 36415 COLL VENOUS BLD VENIPUNCTURE: CPT

## 2024-09-10 PROCEDURE — 90935 HEMODIALYSIS ONE EVALUATION: CPT

## 2024-09-10 PROCEDURE — 80053 COMPREHEN METABOLIC PANEL: CPT

## 2024-09-10 PROCEDURE — 6370000000 HC RX 637 (ALT 250 FOR IP)

## 2024-09-10 PROCEDURE — 85025 COMPLETE CBC W/AUTO DIFF WBC: CPT

## 2024-09-10 PROCEDURE — 6370000000 HC RX 637 (ALT 250 FOR IP): Performed by: NURSE PRACTITIONER

## 2024-09-10 PROCEDURE — 99238 HOSP IP/OBS DSCHRG MGMT 30/<: CPT | Performed by: INTERNAL MEDICINE

## 2024-09-10 PROCEDURE — 6360000002 HC RX W HCPCS

## 2024-09-10 RX ORDER — METOPROLOL SUCCINATE 25 MG/1
25 TABLET, EXTENDED RELEASE ORAL DAILY
Qty: 30 TABLET | Refills: 3 | Status: ON HOLD | OUTPATIENT
Start: 2024-09-11

## 2024-09-10 RX ADMIN — METOPROLOL SUCCINATE 25 MG: 25 TABLET, EXTENDED RELEASE ORAL at 08:30

## 2024-09-10 RX ADMIN — EPOETIN ALFA-EPBX 10000 UNITS: 10000 INJECTION, SOLUTION INTRAVENOUS; SUBCUTANEOUS at 08:35

## 2024-09-10 RX ADMIN — PANTOPRAZOLE SODIUM 40 MG: 40 TABLET, DELAYED RELEASE ORAL at 08:30

## 2024-09-10 RX ADMIN — PETROLATUM: 420 OINTMENT TOPICAL at 08:32

## 2024-09-10 RX ADMIN — SODIUM CHLORIDE, PRESERVATIVE FREE 10 ML: 5 INJECTION INTRAVENOUS at 08:32

## 2024-09-10 RX ADMIN — HEPARIN SODIUM 5000 UNITS: 5000 INJECTION INTRAVENOUS; SUBCUTANEOUS at 06:54

## 2024-09-10 RX ADMIN — BISACODYL 5 MG: 5 TABLET, COATED ORAL at 08:30

## 2024-09-10 RX ADMIN — SENNOSIDES AND DOCUSATE SODIUM 1 TABLET: 50; 8.6 TABLET ORAL at 08:30

## 2024-09-11 ENCOUNTER — CARE COORDINATION (OUTPATIENT)
Dept: CARE COORDINATION | Age: 79
End: 2024-09-11

## 2024-09-11 DIAGNOSIS — I31.39 PERICARDIAL EFFUSION: Primary | ICD-10-CM

## 2024-09-11 PROCEDURE — 1111F DSCHRG MED/CURRENT MED MERGE: CPT | Performed by: INTERNAL MEDICINE

## 2024-09-11 RX ORDER — HYDROCODONE BITARTRATE AND ACETAMINOPHEN 5; 325 MG/1; MG/1
1 TABLET ORAL EVERY 6 HOURS PRN
Status: ON HOLD | COMMUNITY

## 2024-09-13 ENCOUNTER — TELEPHONE (OUTPATIENT)
Dept: CARDIOTHORACIC SURGERY | Age: 79
End: 2024-09-13

## 2024-09-14 ENCOUNTER — HOSPITAL ENCOUNTER (INPATIENT)
Age: 79
LOS: 2 days | Discharge: HOME HEALTH | DRG: 660 | End: 2024-09-19
Attending: EMERGENCY MEDICINE | Admitting: FAMILY MEDICINE
Payer: MEDICARE

## 2024-09-14 ENCOUNTER — APPOINTMENT (OUTPATIENT)
Dept: GENERAL RADIOLOGY | Age: 79
DRG: 660 | End: 2024-09-14
Payer: MEDICARE

## 2024-09-14 ENCOUNTER — APPOINTMENT (OUTPATIENT)
Dept: CT IMAGING | Age: 79
DRG: 660 | End: 2024-09-14
Payer: MEDICARE

## 2024-09-14 DIAGNOSIS — R10.9 RIGHT FLANK PAIN: ICD-10-CM

## 2024-09-14 DIAGNOSIS — R79.89 ELEVATED TROPONIN: ICD-10-CM

## 2024-09-14 DIAGNOSIS — N18.6 END STAGE RENAL DISEASE (HCC): ICD-10-CM

## 2024-09-14 DIAGNOSIS — I31.39 PERICARDIAL EFFUSION: Primary | ICD-10-CM

## 2024-09-14 DIAGNOSIS — R10.9 INTRACTABLE ABDOMINAL PAIN: ICD-10-CM

## 2024-09-14 DIAGNOSIS — R93.5 ABNORMAL CT OF THE ABDOMEN: ICD-10-CM

## 2024-09-14 DIAGNOSIS — J90 PLEURAL EFFUSION: ICD-10-CM

## 2024-09-14 DIAGNOSIS — C61 CARCINOMA OF PROSTATE (HCC): ICD-10-CM

## 2024-09-14 PROBLEM — E87.70 VOLUME OVERLOAD: Status: ACTIVE | Noted: 2024-09-14

## 2024-09-14 PROBLEM — I50.32 CHRONIC HEART FAILURE WITH PRESERVED EJECTION FRACTION (HFPEF) (HCC): Status: ACTIVE | Noted: 2024-08-28

## 2024-09-14 PROBLEM — Z99.2 ADMISSION FOR DIALYSIS (HCC): Status: ACTIVE | Noted: 2024-09-14

## 2024-09-14 PROBLEM — G89.29 CHRONIC RIGHT FLANK PAIN: Status: ACTIVE | Noted: 2024-09-14

## 2024-09-14 LAB
ALBUMIN SERPL-MCNC: 3.4 G/DL (ref 3.5–5.2)
ALP SERPL-CCNC: 89 U/L (ref 40–129)
ALT SERPL-CCNC: 6 U/L (ref 0–40)
ANION GAP SERPL CALCULATED.3IONS-SCNC: 16 MMOL/L (ref 7–16)
AST SERPL-CCNC: 32 U/L (ref 0–39)
BASOPHILS # BLD: 0 K/UL (ref 0–0.2)
BASOPHILS NFR BLD: 0 % (ref 0–2)
BILIRUB SERPL-MCNC: 0.9 MG/DL (ref 0–1.2)
BNP SERPL-MCNC: ABNORMAL PG/ML (ref 0–450)
BUN SERPL-MCNC: 39 MG/DL (ref 6–23)
CALCIUM SERPL-MCNC: 7.8 MG/DL (ref 8.6–10.2)
CHLORIDE SERPL-SCNC: 96 MMOL/L (ref 98–107)
CO2 SERPL-SCNC: 24 MMOL/L (ref 22–29)
CREAT SERPL-MCNC: 5.8 MG/DL (ref 0.7–1.2)
EOSINOPHIL # BLD: 0 K/UL (ref 0.05–0.5)
EOSINOPHILS RELATIVE PERCENT: 0 % (ref 0–6)
ERYTHROCYTE [DISTWIDTH] IN BLOOD BY AUTOMATED COUNT: 18.2 % (ref 11.5–15)
GFR, ESTIMATED: 9 ML/MIN/1.73M2
GLUCOSE SERPL-MCNC: 114 MG/DL (ref 74–99)
HCT VFR BLD AUTO: 28 % (ref 37–54)
HGB BLD-MCNC: 8.8 G/DL (ref 12.5–16.5)
LACTATE BLDV-SCNC: 1.6 MMOL/L (ref 0.5–2.2)
LIPASE SERPL-CCNC: 31 U/L (ref 13–60)
LYMPHOCYTES NFR BLD: 0.69 K/UL (ref 1.5–4)
LYMPHOCYTES RELATIVE PERCENT: 8 % (ref 20–42)
MAGNESIUM SERPL-MCNC: 1.8 MG/DL (ref 1.6–2.6)
MCH RBC QN AUTO: 34 PG (ref 26–35)
MCHC RBC AUTO-ENTMCNC: 31.4 G/DL (ref 32–34.5)
MCV RBC AUTO: 108.1 FL (ref 80–99.9)
MICROORGANISM SPEC CULT: NORMAL
MICROORGANISM/AGENT SPEC: NORMAL
MONOCYTES NFR BLD: 0.46 K/UL (ref 0.1–0.95)
MONOCYTES NFR BLD: 5 % (ref 2–12)
NEUTROPHILS NFR BLD: 87 % (ref 43–80)
NEUTS SEG NFR BLD: 7.65 K/UL (ref 1.8–7.3)
PHOSPHATE SERPL-MCNC: 3 MG/DL (ref 2.5–4.5)
PLATELET # BLD AUTO: 178 K/UL (ref 130–450)
PMV BLD AUTO: 10.9 FL (ref 7–12)
POTASSIUM SERPL-SCNC: 4.6 MMOL/L (ref 3.5–5)
PROCALCITONIN SERPL-MCNC: 0.26 NG/ML (ref 0–0.08)
PROT SERPL-MCNC: 7.1 G/DL (ref 6.4–8.3)
RBC # BLD AUTO: 2.59 M/UL (ref 3.8–5.8)
RBC # BLD: NORMAL 10*6/UL
SERVICE CMNT-IMP: NORMAL
SODIUM SERPL-SCNC: 136 MMOL/L (ref 132–146)
SPECIMEN DESCRIPTION: NORMAL
TROPONIN I SERPL HS-MCNC: 1065 NG/L (ref 0–11)
TROPONIN I SERPL HS-MCNC: 999 NG/L (ref 0–11)
WBC OTHER # BLD: 8.8 K/UL (ref 4.5–11.5)

## 2024-09-14 PROCEDURE — 99223 1ST HOSP IP/OBS HIGH 75: CPT | Performed by: INTERNAL MEDICINE

## 2024-09-14 PROCEDURE — 74176 CT ABD & PELVIS W/O CONTRAST: CPT

## 2024-09-14 PROCEDURE — 6360000002 HC RX W HCPCS: Performed by: NURSE PRACTITIONER

## 2024-09-14 PROCEDURE — 83605 ASSAY OF LACTIC ACID: CPT

## 2024-09-14 PROCEDURE — 84100 ASSAY OF PHOSPHORUS: CPT

## 2024-09-14 PROCEDURE — 2580000003 HC RX 258: Performed by: NURSE PRACTITIONER

## 2024-09-14 PROCEDURE — APPSS45 APP SPLIT SHARED TIME 31-45 MINUTES: Performed by: NURSE PRACTITIONER

## 2024-09-14 PROCEDURE — G0378 HOSPITAL OBSERVATION PER HR: HCPCS

## 2024-09-14 PROCEDURE — 90935 HEMODIALYSIS ONE EVALUATION: CPT

## 2024-09-14 PROCEDURE — 84484 ASSAY OF TROPONIN QUANT: CPT

## 2024-09-14 PROCEDURE — 83880 ASSAY OF NATRIURETIC PEPTIDE: CPT

## 2024-09-14 PROCEDURE — 84145 PROCALCITONIN (PCT): CPT

## 2024-09-14 PROCEDURE — 85025 COMPLETE CBC W/AUTO DIFF WBC: CPT

## 2024-09-14 PROCEDURE — 96375 TX/PRO/DX INJ NEW DRUG ADDON: CPT

## 2024-09-14 PROCEDURE — 80053 COMPREHEN METABOLIC PANEL: CPT

## 2024-09-14 PROCEDURE — 6370000000 HC RX 637 (ALT 250 FOR IP): Performed by: EMERGENCY MEDICINE

## 2024-09-14 PROCEDURE — 6360000002 HC RX W HCPCS: Performed by: EMERGENCY MEDICINE

## 2024-09-14 PROCEDURE — 93005 ELECTROCARDIOGRAM TRACING: CPT | Performed by: EMERGENCY MEDICINE

## 2024-09-14 PROCEDURE — 83735 ASSAY OF MAGNESIUM: CPT

## 2024-09-14 PROCEDURE — 71045 X-RAY EXAM CHEST 1 VIEW: CPT

## 2024-09-14 PROCEDURE — 96374 THER/PROPH/DIAG INJ IV PUSH: CPT

## 2024-09-14 PROCEDURE — 99285 EMERGENCY DEPT VISIT HI MDM: CPT

## 2024-09-14 PROCEDURE — 83690 ASSAY OF LIPASE: CPT

## 2024-09-14 RX ORDER — SODIUM CHLORIDE 0.9 % (FLUSH) 0.9 %
5-40 SYRINGE (ML) INJECTION EVERY 12 HOURS SCHEDULED
Status: DISCONTINUED | OUTPATIENT
Start: 2024-09-14 | End: 2024-09-19 | Stop reason: HOSPADM

## 2024-09-14 RX ORDER — OXYCODONE HYDROCHLORIDE 5 MG/1
5 TABLET ORAL ONCE
Status: COMPLETED | OUTPATIENT
Start: 2024-09-14 | End: 2024-09-14

## 2024-09-14 RX ORDER — ACETAMINOPHEN 650 MG/1
650 SUPPOSITORY RECTAL EVERY 6 HOURS PRN
Status: DISCONTINUED | OUTPATIENT
Start: 2024-09-14 | End: 2024-09-19 | Stop reason: HOSPADM

## 2024-09-14 RX ORDER — POLYETHYLENE GLYCOL 3350 17 G/17G
17 POWDER, FOR SOLUTION ORAL DAILY PRN
Status: DISCONTINUED | OUTPATIENT
Start: 2024-09-14 | End: 2024-09-19 | Stop reason: HOSPADM

## 2024-09-14 RX ORDER — ONDANSETRON 4 MG/1
4 TABLET, ORALLY DISINTEGRATING ORAL EVERY 8 HOURS PRN
Status: DISCONTINUED | OUTPATIENT
Start: 2024-09-14 | End: 2024-09-19 | Stop reason: HOSPADM

## 2024-09-14 RX ORDER — M-VIT,TX,IRON,MINS/CALC/FOLIC 27MG-0.4MG
1 TABLET ORAL DAILY
Status: DISCONTINUED | OUTPATIENT
Start: 2024-09-15 | End: 2024-09-19 | Stop reason: HOSPADM

## 2024-09-14 RX ORDER — METOPROLOL SUCCINATE 25 MG/1
25 TABLET, EXTENDED RELEASE ORAL DAILY
Status: DISCONTINUED | OUTPATIENT
Start: 2024-09-15 | End: 2024-09-19 | Stop reason: HOSPADM

## 2024-09-14 RX ORDER — PANTOPRAZOLE SODIUM 40 MG/1
40 TABLET, DELAYED RELEASE ORAL DAILY
Status: DISCONTINUED | OUTPATIENT
Start: 2024-09-15 | End: 2024-09-19 | Stop reason: HOSPADM

## 2024-09-14 RX ORDER — SODIUM CHLORIDE 9 MG/ML
INJECTION, SOLUTION INTRAVENOUS PRN
Status: DISCONTINUED | OUTPATIENT
Start: 2024-09-14 | End: 2024-09-19 | Stop reason: HOSPADM

## 2024-09-14 RX ORDER — FENTANYL CITRATE 50 UG/ML
50 INJECTION, SOLUTION INTRAMUSCULAR; INTRAVENOUS EVERY 30 MIN PRN
Status: DISCONTINUED | OUTPATIENT
Start: 2024-09-14 | End: 2024-09-14 | Stop reason: ALTCHOICE

## 2024-09-14 RX ORDER — ACETAMINOPHEN 325 MG/1
650 TABLET ORAL EVERY 6 HOURS PRN
Status: DISCONTINUED | OUTPATIENT
Start: 2024-09-14 | End: 2024-09-19 | Stop reason: HOSPADM

## 2024-09-14 RX ORDER — HEPARIN SODIUM 5000 [USP'U]/ML
5000 INJECTION, SOLUTION INTRAVENOUS; SUBCUTANEOUS EVERY 8 HOURS SCHEDULED
Status: DISCONTINUED | OUTPATIENT
Start: 2024-09-14 | End: 2024-09-19 | Stop reason: HOSPADM

## 2024-09-14 RX ORDER — ONDANSETRON 2 MG/ML
4 INJECTION INTRAMUSCULAR; INTRAVENOUS EVERY 6 HOURS PRN
Status: DISCONTINUED | OUTPATIENT
Start: 2024-09-14 | End: 2024-09-19 | Stop reason: HOSPADM

## 2024-09-14 RX ORDER — SODIUM CHLORIDE 0.9 % (FLUSH) 0.9 %
5-40 SYRINGE (ML) INJECTION PRN
Status: DISCONTINUED | OUTPATIENT
Start: 2024-09-14 | End: 2024-09-19 | Stop reason: HOSPADM

## 2024-09-14 RX ADMIN — WATER 2000 MG: 1 INJECTION INTRAMUSCULAR; INTRAVENOUS; SUBCUTANEOUS at 21:05

## 2024-09-14 RX ADMIN — OXYCODONE HYDROCHLORIDE 5 MG: 5 TABLET ORAL at 13:08

## 2024-09-14 RX ADMIN — FENTANYL CITRATE 50 MCG: 50 INJECTION INTRAMUSCULAR; INTRAVENOUS at 12:40

## 2024-09-14 RX ADMIN — HYDROMORPHONE HYDROCHLORIDE 0.5 MG: 1 INJECTION, SOLUTION INTRAMUSCULAR; INTRAVENOUS; SUBCUTANEOUS at 14:00

## 2024-09-14 ASSESSMENT — PAIN SCALES - GENERAL
PAINLEVEL_OUTOF10: 10

## 2024-09-14 ASSESSMENT — PAIN - FUNCTIONAL ASSESSMENT: PAIN_FUNCTIONAL_ASSESSMENT: 0-10

## 2024-09-15 LAB
ANION GAP SERPL CALCULATED.3IONS-SCNC: 14 MMOL/L (ref 7–16)
BUN SERPL-MCNC: 36 MG/DL (ref 6–23)
CALCIUM SERPL-MCNC: 8 MG/DL (ref 8.6–10.2)
CHLORIDE SERPL-SCNC: 98 MMOL/L (ref 98–107)
CO2 SERPL-SCNC: 22 MMOL/L (ref 22–29)
CREAT SERPL-MCNC: 5.6 MG/DL (ref 0.7–1.2)
EKG ATRIAL RATE: 76 BPM
EKG P AXIS: 51 DEGREES
EKG P-R INTERVAL: 180 MS
EKG Q-T INTERVAL: 444 MS
EKG QRS DURATION: 100 MS
EKG QTC CALCULATION (BAZETT): 499 MS
EKG R AXIS: -58 DEGREES
EKG T AXIS: 82 DEGREES
EKG VENTRICULAR RATE: 76 BPM
GFR, ESTIMATED: 10 ML/MIN/1.73M2
GLUCOSE SERPL-MCNC: 109 MG/DL (ref 74–99)
MICROORGANISM SPEC CULT: NORMAL
MICROORGANISM/AGENT SPEC: NORMAL
POTASSIUM SERPL-SCNC: 4.1 MMOL/L (ref 3.5–5)
SERVICE CMNT-IMP: NORMAL
SODIUM SERPL-SCNC: 134 MMOL/L (ref 132–146)
SPECIMEN DESCRIPTION: NORMAL

## 2024-09-15 PROCEDURE — 36415 COLL VENOUS BLD VENIPUNCTURE: CPT

## 2024-09-15 PROCEDURE — 2580000003 HC RX 258: Performed by: NURSE PRACTITIONER

## 2024-09-15 PROCEDURE — 6360000002 HC RX W HCPCS: Performed by: NURSE PRACTITIONER

## 2024-09-15 PROCEDURE — 99233 SBSQ HOSP IP/OBS HIGH 50: CPT | Performed by: INTERNAL MEDICINE

## 2024-09-15 PROCEDURE — APPSS30 APP SPLIT SHARED TIME 16-30 MINUTES: Performed by: NURSE PRACTITIONER

## 2024-09-15 PROCEDURE — G0378 HOSPITAL OBSERVATION PER HR: HCPCS

## 2024-09-15 PROCEDURE — 96372 THER/PROPH/DIAG INJ SC/IM: CPT

## 2024-09-15 PROCEDURE — 6370000000 HC RX 637 (ALT 250 FOR IP): Performed by: NURSE PRACTITIONER

## 2024-09-15 PROCEDURE — 99222 1ST HOSP IP/OBS MODERATE 55: CPT | Performed by: NURSE PRACTITIONER

## 2024-09-15 PROCEDURE — 80048 BASIC METABOLIC PNL TOTAL CA: CPT

## 2024-09-15 PROCEDURE — 96376 TX/PRO/DX INJ SAME DRUG ADON: CPT

## 2024-09-15 PROCEDURE — 93010 ELECTROCARDIOGRAM REPORT: CPT | Performed by: INTERNAL MEDICINE

## 2024-09-15 PROCEDURE — 87040 BLOOD CULTURE FOR BACTERIA: CPT

## 2024-09-15 RX ORDER — OXYCODONE AND ACETAMINOPHEN 5; 325 MG/1; MG/1
1 TABLET ORAL EVERY 6 HOURS PRN
Status: DISCONTINUED | OUTPATIENT
Start: 2024-09-15 | End: 2024-09-19 | Stop reason: HOSPADM

## 2024-09-15 RX ADMIN — Medication 10 ML: at 03:38

## 2024-09-15 RX ADMIN — Medication 1 TABLET: at 09:20

## 2024-09-15 RX ADMIN — Medication 10 ML: at 09:20

## 2024-09-15 RX ADMIN — OXYCODONE HYDROCHLORIDE AND ACETAMINOPHEN 1 TABLET: 5; 325 TABLET ORAL at 23:31

## 2024-09-15 RX ADMIN — PANTOPRAZOLE SODIUM 40 MG: 40 TABLET, DELAYED RELEASE ORAL at 09:20

## 2024-09-15 RX ADMIN — WATER 2000 MG: 1 INJECTION INTRAMUSCULAR; INTRAVENOUS; SUBCUTANEOUS at 17:15

## 2024-09-15 RX ADMIN — Medication 10 ML: at 21:28

## 2024-09-15 RX ADMIN — HEPARIN SODIUM 5000 UNITS: 5000 INJECTION INTRAVENOUS; SUBCUTANEOUS at 12:53

## 2024-09-15 RX ADMIN — METOPROLOL SUCCINATE 25 MG: 25 TABLET, FILM COATED, EXTENDED RELEASE ORAL at 09:20

## 2024-09-15 RX ADMIN — OXYCODONE HYDROCHLORIDE AND ACETAMINOPHEN 1 TABLET: 5; 325 TABLET ORAL at 16:43

## 2024-09-15 RX ADMIN — HEPARIN SODIUM 5000 UNITS: 5000 INJECTION INTRAVENOUS; SUBCUTANEOUS at 21:27

## 2024-09-15 ASSESSMENT — PAIN SCALES - GENERAL
PAINLEVEL_OUTOF10: 0
PAINLEVEL_OUTOF10: 6
PAINLEVEL_OUTOF10: 0
PAINLEVEL_OUTOF10: 1
PAINLEVEL_OUTOF10: 6

## 2024-09-15 ASSESSMENT — PAIN DESCRIPTION - ORIENTATION
ORIENTATION: RIGHT
ORIENTATION: RIGHT

## 2024-09-15 ASSESSMENT — PAIN SCALES - WONG BAKER: WONGBAKER_NUMERICALRESPONSE: NO HURT

## 2024-09-15 ASSESSMENT — PAIN DESCRIPTION - DESCRIPTORS
DESCRIPTORS: ACHING
DESCRIPTORS: ACHING;STABBING

## 2024-09-15 ASSESSMENT — PAIN - FUNCTIONAL ASSESSMENT: PAIN_FUNCTIONAL_ASSESSMENT: PREVENTS OR INTERFERES SOME ACTIVE ACTIVITIES AND ADLS

## 2024-09-15 ASSESSMENT — PAIN DESCRIPTION - LOCATION
LOCATION: BACK
LOCATION: FLANK

## 2024-09-16 ENCOUNTER — APPOINTMENT (OUTPATIENT)
Dept: NUCLEAR MEDICINE | Age: 79
DRG: 660 | End: 2024-09-16
Payer: MEDICARE

## 2024-09-16 ENCOUNTER — APPOINTMENT (OUTPATIENT)
Age: 79
DRG: 660 | End: 2024-09-16
Payer: MEDICARE

## 2024-09-16 ENCOUNTER — ANESTHESIA EVENT (OUTPATIENT)
Dept: OPERATING ROOM | Age: 79
End: 2024-09-16
Payer: MEDICARE

## 2024-09-16 LAB
ANION GAP SERPL CALCULATED.3IONS-SCNC: 16 MMOL/L (ref 7–16)
BUN SERPL-MCNC: 44 MG/DL (ref 6–23)
CALCIUM SERPL-MCNC: 7.4 MG/DL (ref 8.6–10.2)
CHLORIDE SERPL-SCNC: 99 MMOL/L (ref 98–107)
CO2 SERPL-SCNC: 21 MMOL/L (ref 22–29)
CREAT SERPL-MCNC: 6.8 MG/DL (ref 0.7–1.2)
GFR, ESTIMATED: 8 ML/MIN/1.73M2
GLUCOSE SERPL-MCNC: 97 MG/DL (ref 74–99)
POTASSIUM SERPL-SCNC: 3.9 MMOL/L (ref 3.5–5)
SODIUM SERPL-SCNC: 136 MMOL/L (ref 132–146)

## 2024-09-16 PROCEDURE — 3430000000 HC RX DIAGNOSTIC RADIOPHARMACEUTICAL: Performed by: RADIOLOGY

## 2024-09-16 PROCEDURE — G0378 HOSPITAL OBSERVATION PER HR: HCPCS

## 2024-09-16 PROCEDURE — 96376 TX/PRO/DX INJ SAME DRUG ADON: CPT

## 2024-09-16 PROCEDURE — 2580000003 HC RX 258: Performed by: NURSE PRACTITIONER

## 2024-09-16 PROCEDURE — 6360000002 HC RX W HCPCS: Performed by: NURSE PRACTITIONER

## 2024-09-16 PROCEDURE — 78708 K FLOW/FUNCT IMAGE W/DRUG: CPT

## 2024-09-16 PROCEDURE — 80048 BASIC METABOLIC PNL TOTAL CA: CPT

## 2024-09-16 PROCEDURE — A9562 TC99M MERTIATIDE: HCPCS | Performed by: RADIOLOGY

## 2024-09-16 PROCEDURE — 6370000000 HC RX 637 (ALT 250 FOR IP): Performed by: NURSE PRACTITIONER

## 2024-09-16 PROCEDURE — 99232 SBSQ HOSP IP/OBS MODERATE 35: CPT | Performed by: FAMILY MEDICINE

## 2024-09-16 PROCEDURE — 96375 TX/PRO/DX INJ NEW DRUG ADDON: CPT

## 2024-09-16 PROCEDURE — 6360000002 HC RX W HCPCS: Performed by: RADIOLOGY

## 2024-09-16 PROCEDURE — 96372 THER/PROPH/DIAG INJ SC/IM: CPT

## 2024-09-16 PROCEDURE — G0378 HOSPITAL OBSERVATION PER HR: HCPCS | Performed by: FAMILY MEDICINE

## 2024-09-16 PROCEDURE — 36415 COLL VENOUS BLD VENIPUNCTURE: CPT

## 2024-09-16 PROCEDURE — 93308 TTE F-UP OR LMTD: CPT

## 2024-09-16 RX ORDER — FUROSEMIDE 10 MG/ML
40 INJECTION INTRAMUSCULAR; INTRAVENOUS ONCE
Status: COMPLETED | OUTPATIENT
Start: 2024-09-16 | End: 2024-09-16

## 2024-09-16 RX ADMIN — Medication 10 ML: at 20:58

## 2024-09-16 RX ADMIN — Medication 8 MILLICURIE: at 13:58

## 2024-09-16 RX ADMIN — HYDROMORPHONE HYDROCHLORIDE 0.5 MG: 1 INJECTION, SOLUTION INTRAMUSCULAR; INTRAVENOUS; SUBCUTANEOUS at 22:35

## 2024-09-16 RX ADMIN — METOPROLOL SUCCINATE 25 MG: 25 TABLET, FILM COATED, EXTENDED RELEASE ORAL at 08:40

## 2024-09-16 RX ADMIN — HEPARIN SODIUM 5000 UNITS: 5000 INJECTION INTRAVENOUS; SUBCUTANEOUS at 19:54

## 2024-09-16 RX ADMIN — PANTOPRAZOLE SODIUM 40 MG: 40 TABLET, DELAYED RELEASE ORAL at 08:40

## 2024-09-16 RX ADMIN — OXYCODONE HYDROCHLORIDE AND ACETAMINOPHEN 1 TABLET: 5; 325 TABLET ORAL at 20:57

## 2024-09-16 RX ADMIN — HEPARIN SODIUM 5000 UNITS: 5000 INJECTION INTRAVENOUS; SUBCUTANEOUS at 06:21

## 2024-09-16 RX ADMIN — Medication 1 TABLET: at 08:40

## 2024-09-16 RX ADMIN — OXYCODONE HYDROCHLORIDE AND ACETAMINOPHEN 1 TABLET: 5; 325 TABLET ORAL at 15:03

## 2024-09-16 RX ADMIN — HEPARIN SODIUM 5000 UNITS: 5000 INJECTION INTRAVENOUS; SUBCUTANEOUS at 14:44

## 2024-09-16 RX ADMIN — Medication 10 ML: at 08:41

## 2024-09-16 RX ADMIN — WATER 2000 MG: 1 INJECTION INTRAMUSCULAR; INTRAVENOUS; SUBCUTANEOUS at 17:51

## 2024-09-16 RX ADMIN — FUROSEMIDE 40 MG: 10 INJECTION, SOLUTION INTRAMUSCULAR; INTRAVENOUS at 13:56

## 2024-09-16 ASSESSMENT — PAIN SCALES - GENERAL
PAINLEVEL_OUTOF10: 10
PAINLEVEL_OUTOF10: 0
PAINLEVEL_OUTOF10: 5
PAINLEVEL_OUTOF10: 10

## 2024-09-16 ASSESSMENT — PAIN DESCRIPTION - FREQUENCY
FREQUENCY: CONTINUOUS
FREQUENCY: CONTINUOUS

## 2024-09-16 ASSESSMENT — PAIN SCALES - WONG BAKER
WONGBAKER_NUMERICALRESPONSE: NO HURT
WONGBAKER_NUMERICALRESPONSE: NO HURT

## 2024-09-16 ASSESSMENT — PAIN DESCRIPTION - LOCATION
LOCATION: ABDOMEN
LOCATION: FLANK
LOCATION: FLANK

## 2024-09-16 ASSESSMENT — PAIN DESCRIPTION - ONSET
ONSET: ON-GOING
ONSET: ON-GOING

## 2024-09-16 ASSESSMENT — PAIN DESCRIPTION - PAIN TYPE
TYPE: ACUTE PAIN
TYPE: ACUTE PAIN

## 2024-09-16 ASSESSMENT — PAIN DESCRIPTION - DESCRIPTORS
DESCRIPTORS: ACHING
DESCRIPTORS: ACHING;SHARP
DESCRIPTORS: ACHING;SHARP

## 2024-09-16 ASSESSMENT — PAIN DESCRIPTION - ORIENTATION
ORIENTATION: RIGHT

## 2024-09-16 ASSESSMENT — PAIN - FUNCTIONAL ASSESSMENT: PAIN_FUNCTIONAL_ASSESSMENT: PREVENTS OR INTERFERES WITH MANY ACTIVE NOT PASSIVE ACTIVITIES

## 2024-09-17 ENCOUNTER — APPOINTMENT (OUTPATIENT)
Dept: GENERAL RADIOLOGY | Age: 79
DRG: 660 | End: 2024-09-17
Payer: MEDICARE

## 2024-09-17 ENCOUNTER — ANESTHESIA (OUTPATIENT)
Dept: OPERATING ROOM | Age: 79
End: 2024-09-17
Payer: MEDICARE

## 2024-09-17 PROBLEM — Z51.5 PALLIATIVE CARE ENCOUNTER: Status: ACTIVE | Noted: 2024-09-17

## 2024-09-17 PROBLEM — N13.9 ACUTE UNILATERAL OBSTRUCTIVE UROPATHY: Status: ACTIVE | Noted: 2024-09-17

## 2024-09-17 LAB
ANION GAP SERPL CALCULATED.3IONS-SCNC: 15 MMOL/L (ref 7–16)
BILIRUB UR QL STRIP: ABNORMAL
BUN SERPL-MCNC: 52 MG/DL (ref 6–23)
CALCIUM SERPL-MCNC: 7.3 MG/DL (ref 8.6–10.2)
CHLORIDE SERPL-SCNC: 94 MMOL/L (ref 98–107)
CLARITY UR: ABNORMAL
CO2 SERPL-SCNC: 22 MMOL/L (ref 22–29)
COLOR UR: ABNORMAL
CREAT SERPL-MCNC: 7.9 MG/DL (ref 0.7–1.2)
ECHO BSA: 2.02 M2
ECHO EST RA PRESSURE: 8 MMHG
ECHO LV EF PHYSICIAN: 55 %
ECHO LV EJECTION FRACTION BIPLANE: 55 % (ref 55–100)
ECHO LV FRACTIONAL SHORTENING: 30 % (ref 28–44)
ECHO LV INTERNAL DIMENSION DIASTOLE INDEX: 2.21 CM/M2
ECHO LV INTERNAL DIMENSION DIASTOLIC: 4.4 CM (ref 4.2–5.9)
ECHO LV INTERNAL DIMENSION SYSTOLIC INDEX: 1.56 CM/M2
ECHO LV INTERNAL DIMENSION SYSTOLIC: 3.1 CM
ECHO LV IVSD: 1.4 CM (ref 0.6–1)
ECHO LV IVSS: 1.7 CM
ECHO LV MASS 2D: 240.3 G (ref 88–224)
ECHO LV MASS INDEX 2D: 120.7 G/M2 (ref 49–115)
ECHO LV POSTERIOR WALL DIASTOLIC: 1.4 CM (ref 0.6–1)
ECHO LV POSTERIOR WALL SYSTOLIC: 1.7 CM
ECHO LV RELATIVE WALL THICKNESS RATIO: 0.64
ECHO RIGHT VENTRICULAR SYSTOLIC PRESSURE (RVSP): 35 MMHG
ECHO TV REGURGITANT MAX VELOCITY: 2.6 M/S
ECHO TV REGURGITANT PEAK GRADIENT: 27 MMHG
GFR, ESTIMATED: 6 ML/MIN/1.73M2
GLUCOSE SERPL-MCNC: 100 MG/DL (ref 74–99)
GLUCOSE UR STRIP-MCNC: NEGATIVE MG/DL
HGB UR QL STRIP.AUTO: ABNORMAL
KETONES UR STRIP-MCNC: ABNORMAL MG/DL
LEUKOCYTE ESTERASE UR QL STRIP: ABNORMAL
NITRITE UR QL STRIP: NEGATIVE
PH UR STRIP: 7 [PH] (ref 5–9)
POTASSIUM SERPL-SCNC: 4.1 MMOL/L (ref 3.5–5)
PROT UR STRIP-MCNC: >=300 MG/DL
RBC #/AREA URNS HPF: ABNORMAL /HPF
SODIUM SERPL-SCNC: 131 MMOL/L (ref 132–146)
SP GR UR STRIP: 1.02 (ref 1–1.03)
UROBILINOGEN UR STRIP-ACNC: 1 EU/DL (ref 0–1)
WBC #/AREA URNS HPF: ABNORMAL /HPF

## 2024-09-17 PROCEDURE — 5A1D70Z PERFORMANCE OF URINARY FILTRATION, INTERMITTENT, LESS THAN 6 HOURS PER DAY: ICD-10-PCS | Performed by: FAMILY MEDICINE

## 2024-09-17 PROCEDURE — 81001 URINALYSIS AUTO W/SCOPE: CPT

## 2024-09-17 PROCEDURE — 3700000000 HC ANESTHESIA ATTENDED CARE: Performed by: UROLOGY

## 2024-09-17 PROCEDURE — 2709999900 HC NON-CHARGEABLE SUPPLY: Performed by: UROLOGY

## 2024-09-17 PROCEDURE — 6360000002 HC RX W HCPCS: Performed by: INTERNAL MEDICINE

## 2024-09-17 PROCEDURE — 96372 THER/PROPH/DIAG INJ SC/IM: CPT

## 2024-09-17 PROCEDURE — BT141ZZ FLUOROSCOPY OF KIDNEYS, URETERS AND BLADDER USING LOW OSMOLAR CONTRAST: ICD-10-PCS | Performed by: UROLOGY

## 2024-09-17 PROCEDURE — G0378 HOSPITAL OBSERVATION PER HR: HCPCS

## 2024-09-17 PROCEDURE — 0T788DZ DILATION OF BILATERAL URETERS WITH INTRALUMINAL DEVICE, VIA NATURAL OR ARTIFICIAL OPENING ENDOSCOPIC: ICD-10-PCS | Performed by: UROLOGY

## 2024-09-17 PROCEDURE — 1200000000 HC SEMI PRIVATE

## 2024-09-17 PROCEDURE — 6360000002 HC RX W HCPCS: Performed by: NURSE ANESTHETIST, CERTIFIED REGISTERED

## 2024-09-17 PROCEDURE — 99232 SBSQ HOSP IP/OBS MODERATE 35: CPT | Performed by: FAMILY MEDICINE

## 2024-09-17 PROCEDURE — 6370000000 HC RX 637 (ALT 250 FOR IP): Performed by: NURSE PRACTITIONER

## 2024-09-17 PROCEDURE — 36415 COLL VENOUS BLD VENIPUNCTURE: CPT

## 2024-09-17 PROCEDURE — C2617 STENT, NON-COR, TEM W/O DEL: HCPCS | Performed by: UROLOGY

## 2024-09-17 PROCEDURE — 36593 DECLOT VASCULAR DEVICE: CPT

## 2024-09-17 PROCEDURE — 93308 TTE F-UP OR LMTD: CPT | Performed by: INTERNAL MEDICINE

## 2024-09-17 PROCEDURE — 74400 UROGRAPHY IV +-KUB TOMOG: CPT

## 2024-09-17 PROCEDURE — 7100000000 HC PACU RECOVERY - FIRST 15 MIN: Performed by: UROLOGY

## 2024-09-17 PROCEDURE — 2580000003 HC RX 258: Performed by: ANESTHESIOLOGY

## 2024-09-17 PROCEDURE — 7100000001 HC PACU RECOVERY - ADDTL 15 MIN: Performed by: UROLOGY

## 2024-09-17 PROCEDURE — 6360000002 HC RX W HCPCS: Performed by: NURSE PRACTITIONER

## 2024-09-17 PROCEDURE — 93325 DOPPLER ECHO COLOR FLOW MAPG: CPT | Performed by: INTERNAL MEDICINE

## 2024-09-17 PROCEDURE — 99231 SBSQ HOSP IP/OBS SF/LOW 25: CPT | Performed by: NURSE PRACTITIONER

## 2024-09-17 PROCEDURE — 93321 DOPPLER ECHO F-UP/LMTD STD: CPT | Performed by: INTERNAL MEDICINE

## 2024-09-17 PROCEDURE — 3600000003 HC SURGERY LEVEL 3 BASE: Performed by: UROLOGY

## 2024-09-17 PROCEDURE — 2580000003 HC RX 258: Performed by: NURSE PRACTITIONER

## 2024-09-17 PROCEDURE — 80048 BASIC METABOLIC PNL TOTAL CA: CPT

## 2024-09-17 PROCEDURE — 3600000013 HC SURGERY LEVEL 3 ADDTL 15MIN: Performed by: UROLOGY

## 2024-09-17 PROCEDURE — 2580000003 HC RX 258: Performed by: NURSE ANESTHETIST, CERTIFIED REGISTERED

## 2024-09-17 PROCEDURE — 3700000001 HC ADD 15 MINUTES (ANESTHESIA): Performed by: UROLOGY

## 2024-09-17 PROCEDURE — 90935 HEMODIALYSIS ONE EVALUATION: CPT

## 2024-09-17 PROCEDURE — 87086 URINE CULTURE/COLONY COUNT: CPT

## 2024-09-17 DEVICE — URETERAL STENT
Type: IMPLANTABLE DEVICE | Site: URETER | Status: FUNCTIONAL
Brand: PERCUFLEX™

## 2024-09-17 RX ORDER — PROCHLORPERAZINE EDISYLATE 5 MG/ML
5 INJECTION INTRAMUSCULAR; INTRAVENOUS
Status: ACTIVE | OUTPATIENT
Start: 2024-09-17 | End: 2024-09-18

## 2024-09-17 RX ORDER — SODIUM CHLORIDE, SODIUM LACTATE, POTASSIUM CHLORIDE, CALCIUM CHLORIDE 600; 310; 30; 20 MG/100ML; MG/100ML; MG/100ML; MG/100ML
INJECTION, SOLUTION INTRAVENOUS
Status: DISCONTINUED | OUTPATIENT
Start: 2024-09-17 | End: 2024-09-17 | Stop reason: SDUPTHER

## 2024-09-17 RX ORDER — NALOXONE HYDROCHLORIDE 0.4 MG/ML
INJECTION, SOLUTION INTRAMUSCULAR; INTRAVENOUS; SUBCUTANEOUS PRN
Status: DISCONTINUED | OUTPATIENT
Start: 2024-09-17 | End: 2024-09-19 | Stop reason: HOSPADM

## 2024-09-17 RX ORDER — LIDOCAINE HYDROCHLORIDE 20 MG/ML
INJECTION, SOLUTION INTRAVENOUS
Status: DISCONTINUED | OUTPATIENT
Start: 2024-09-17 | End: 2024-09-17 | Stop reason: SDUPTHER

## 2024-09-17 RX ORDER — HALOPERIDOL 5 MG/ML
1 INJECTION INTRAMUSCULAR
Status: ACTIVE | OUTPATIENT
Start: 2024-09-17 | End: 2024-09-18

## 2024-09-17 RX ORDER — SODIUM CHLORIDE 9 MG/ML
INJECTION, SOLUTION INTRAVENOUS PRN
Status: DISCONTINUED | OUTPATIENT
Start: 2024-09-17 | End: 2024-09-19 | Stop reason: HOSPADM

## 2024-09-17 RX ORDER — PROPOFOL 10 MG/ML
INJECTION, EMULSION INTRAVENOUS
Status: DISCONTINUED | OUTPATIENT
Start: 2024-09-17 | End: 2024-09-17 | Stop reason: SDUPTHER

## 2024-09-17 RX ORDER — SODIUM CHLORIDE 0.9 % (FLUSH) 0.9 %
5-40 SYRINGE (ML) INJECTION PRN
Status: DISCONTINUED | OUTPATIENT
Start: 2024-09-17 | End: 2024-09-19 | Stop reason: HOSPADM

## 2024-09-17 RX ORDER — LABETALOL HYDROCHLORIDE 5 MG/ML
10 INJECTION, SOLUTION INTRAVENOUS
Status: DISCONTINUED | OUTPATIENT
Start: 2024-09-17 | End: 2024-09-19 | Stop reason: HOSPADM

## 2024-09-17 RX ORDER — DIPHENHYDRAMINE HYDROCHLORIDE 50 MG/ML
12.5 INJECTION INTRAMUSCULAR; INTRAVENOUS
Status: ACTIVE | OUTPATIENT
Start: 2024-09-17 | End: 2024-09-18

## 2024-09-17 RX ORDER — HYDRALAZINE HYDROCHLORIDE 20 MG/ML
10 INJECTION INTRAMUSCULAR; INTRAVENOUS
Status: DISCONTINUED | OUTPATIENT
Start: 2024-09-17 | End: 2024-09-19 | Stop reason: HOSPADM

## 2024-09-17 RX ORDER — SODIUM CHLORIDE 0.9 % (FLUSH) 0.9 %
5-40 SYRINGE (ML) INJECTION EVERY 12 HOURS SCHEDULED
Status: DISCONTINUED | OUTPATIENT
Start: 2024-09-17 | End: 2024-09-19 | Stop reason: HOSPADM

## 2024-09-17 RX ORDER — IPRATROPIUM BROMIDE AND ALBUTEROL SULFATE 2.5; .5 MG/3ML; MG/3ML
1 SOLUTION RESPIRATORY (INHALATION)
Status: ACTIVE | OUTPATIENT
Start: 2024-09-17 | End: 2024-09-18

## 2024-09-17 RX ADMIN — PROPOFOL INJECTABLE EMULSION 100 MCG/KG/MIN: 10 INJECTION, EMULSION INTRAVENOUS at 10:24

## 2024-09-17 RX ADMIN — METOPROLOL SUCCINATE 25 MG: 25 TABLET, FILM COATED, EXTENDED RELEASE ORAL at 09:41

## 2024-09-17 RX ADMIN — ALTEPLASE 2 MG: 2.2 INJECTION, POWDER, LYOPHILIZED, FOR SOLUTION INTRAVENOUS at 16:01

## 2024-09-17 RX ADMIN — PANTOPRAZOLE SODIUM 40 MG: 40 TABLET, DELAYED RELEASE ORAL at 09:41

## 2024-09-17 RX ADMIN — ALTEPLASE 2 MG: 2.2 INJECTION, POWDER, LYOPHILIZED, FOR SOLUTION INTRAVENOUS at 16:00

## 2024-09-17 RX ADMIN — Medication 10 ML: at 12:15

## 2024-09-17 RX ADMIN — SODIUM CHLORIDE, POTASSIUM CHLORIDE, SODIUM LACTATE AND CALCIUM CHLORIDE: 600; 310; 30; 20 INJECTION, SOLUTION INTRAVENOUS at 10:12

## 2024-09-17 RX ADMIN — Medication 1 TABLET: at 09:41

## 2024-09-17 RX ADMIN — ALTEPLASE 2 MG: 2.2 INJECTION, POWDER, LYOPHILIZED, FOR SOLUTION INTRAVENOUS at 15:18

## 2024-09-17 RX ADMIN — WATER 2000 MG: 1 INJECTION INTRAMUSCULAR; INTRAVENOUS; SUBCUTANEOUS at 18:19

## 2024-09-17 RX ADMIN — ONDANSETRON 4 MG: 4 TABLET, ORALLY DISINTEGRATING ORAL at 10:37

## 2024-09-17 RX ADMIN — Medication 10 ML: at 21:53

## 2024-09-17 RX ADMIN — HEPARIN SODIUM 5000 UNITS: 5000 INJECTION INTRAVENOUS; SUBCUTANEOUS at 21:48

## 2024-09-17 RX ADMIN — LIDOCAINE HYDROCHLORIDE 100 MG: 20 INJECTION, SOLUTION INTRAVENOUS at 10:24

## 2024-09-17 RX ADMIN — Medication 10 ML: at 09:42

## 2024-09-17 RX ADMIN — HYDROMORPHONE HYDROCHLORIDE 0.25 MG: 1 INJECTION, SOLUTION INTRAMUSCULAR; INTRAVENOUS; SUBCUTANEOUS at 09:42

## 2024-09-17 RX ADMIN — OXYCODONE HYDROCHLORIDE AND ACETAMINOPHEN 1 TABLET: 5; 325 TABLET ORAL at 06:23

## 2024-09-17 RX ADMIN — HEPARIN SODIUM 5000 UNITS: 5000 INJECTION INTRAVENOUS; SUBCUTANEOUS at 06:22

## 2024-09-17 ASSESSMENT — PAIN SCALES - GENERAL
PAINLEVEL_OUTOF10: 6
PAINLEVEL_OUTOF10: 0
PAINLEVEL_OUTOF10: 4

## 2024-09-17 ASSESSMENT — PAIN DESCRIPTION - LOCATION
LOCATION: FLANK
LOCATION: FLANK

## 2024-09-17 ASSESSMENT — PAIN DESCRIPTION - DESCRIPTORS
DESCRIPTORS: ACHING
DESCRIPTORS: ACHING;SHARP

## 2024-09-17 ASSESSMENT — PAIN DESCRIPTION - ORIENTATION
ORIENTATION: RIGHT
ORIENTATION: RIGHT

## 2024-09-17 ASSESSMENT — ENCOUNTER SYMPTOMS: SHORTNESS OF BREATH: 1

## 2024-09-18 ENCOUNTER — HOSPITAL ENCOUNTER (OUTPATIENT)
Dept: CT IMAGING | Age: 79
Discharge: HOME | End: 2024-09-18
Payer: MEDICARE

## 2024-09-18 LAB
ANION GAP SERPL CALCULATED.3IONS-SCNC: 12 MMOL/L (ref 7–16)
BASOPHILS # BLD: 0.05 K/UL (ref 0–0.2)
BASOPHILS NFR BLD: 1 % (ref 0–2)
BUN SERPL-MCNC: 45 MG/DL (ref 6–23)
CALCIUM SERPL-MCNC: 7.9 MG/DL (ref 8.6–10.2)
CHLORIDE SERPL-SCNC: 101 MMOL/L (ref 98–107)
CO2 SERPL-SCNC: 23 MMOL/L (ref 22–29)
CREAT SERPL-MCNC: 4.8 MG/DL (ref 0.7–1.2)
EOSINOPHIL # BLD: 0 K/UL (ref 0.05–0.5)
EOSINOPHILS RELATIVE PERCENT: 0 % (ref 0–6)
ERYTHROCYTE [DISTWIDTH] IN BLOOD BY AUTOMATED COUNT: 17.6 % (ref 11.5–15)
ERYTHROCYTE [DISTWIDTH] IN BLOOD BY AUTOMATED COUNT: 17.9 % (ref 11.5–15)
GFR, ESTIMATED: 12 ML/MIN/1.73M2
GLUCOSE SERPL-MCNC: 106 MG/DL (ref 74–99)
HCT VFR BLD AUTO: 25.9 % (ref 37–54)
HCT VFR BLD AUTO: 27.1 % (ref 37–54)
HGB BLD-MCNC: 8.1 G/DL (ref 12.5–16.5)
HGB BLD-MCNC: 8.3 G/DL (ref 12.5–16.5)
INR PPP: 1.9
LYMPHOCYTES NFR BLD: 0.45 K/UL (ref 1.5–4)
LYMPHOCYTES RELATIVE PERCENT: 9 % (ref 20–42)
MAGNESIUM SERPL-MCNC: 1.9 MG/DL (ref 1.6–2.6)
MCH RBC QN AUTO: 33.1 PG (ref 26–35)
MCH RBC QN AUTO: 33.6 PG (ref 26–35)
MCHC RBC AUTO-ENTMCNC: 30.6 G/DL (ref 32–34.5)
MCHC RBC AUTO-ENTMCNC: 31.3 G/DL (ref 32–34.5)
MCV RBC AUTO: 105.7 FL (ref 80–99.9)
MCV RBC AUTO: 109.7 FL (ref 80–99.9)
MONOCYTES NFR BLD: 0.55 K/UL (ref 0.1–0.95)
MONOCYTES NFR BLD: 11 % (ref 2–12)
NEUTROPHILS NFR BLD: 79 % (ref 43–80)
NEUTS SEG NFR BLD: 3.95 K/UL (ref 1.8–7.3)
PARTIAL THROMBOPLASTIN TIME: 30.8 SEC (ref 24.5–35.1)
PHOSPHATE SERPL-MCNC: 2.9 MG/DL (ref 2.5–4.5)
PLATELET # BLD AUTO: 192 K/UL (ref 130–450)
PLATELET # BLD AUTO: 207 K/UL (ref 130–450)
PMV BLD AUTO: 10.5 FL (ref 7–12)
PMV BLD AUTO: 10.6 FL (ref 7–12)
POTASSIUM SERPL-SCNC: 4 MMOL/L (ref 3.5–5)
PROTHROMBIN TIME: 20.5 SEC (ref 9.3–12.4)
RBC # BLD AUTO: 2.45 M/UL (ref 3.8–5.8)
RBC # BLD AUTO: 2.47 M/UL (ref 3.8–5.8)
RBC # BLD: ABNORMAL 10*6/UL
SODIUM SERPL-SCNC: 136 MMOL/L (ref 132–146)
WBC OTHER # BLD: 5 K/UL (ref 4.5–11.5)
WBC OTHER # BLD: 5.7 K/UL (ref 4.5–11.5)

## 2024-09-18 PROCEDURE — 97161 PT EVAL LOW COMPLEX 20 MIN: CPT | Performed by: PHYSICAL THERAPIST

## 2024-09-18 PROCEDURE — 2580000003 HC RX 258: Performed by: UROLOGY

## 2024-09-18 PROCEDURE — 2060000000 HC ICU INTERMEDIATE R&B

## 2024-09-18 PROCEDURE — 97530 THERAPEUTIC ACTIVITIES: CPT | Performed by: PHYSICAL THERAPIST

## 2024-09-18 PROCEDURE — 2580000003 HC RX 258: Performed by: NURSE PRACTITIONER

## 2024-09-18 PROCEDURE — 6360000002 HC RX W HCPCS: Performed by: UROLOGY

## 2024-09-18 PROCEDURE — 85730 THROMBOPLASTIN TIME PARTIAL: CPT

## 2024-09-18 PROCEDURE — 90935 HEMODIALYSIS ONE EVALUATION: CPT

## 2024-09-18 PROCEDURE — 36415 COLL VENOUS BLD VENIPUNCTURE: CPT

## 2024-09-18 PROCEDURE — 6360000002 HC RX W HCPCS: Performed by: NURSE PRACTITIONER

## 2024-09-18 PROCEDURE — 85610 PROTHROMBIN TIME: CPT

## 2024-09-18 PROCEDURE — 85025 COMPLETE CBC W/AUTO DIFF WBC: CPT

## 2024-09-18 PROCEDURE — 80048 BASIC METABOLIC PNL TOTAL CA: CPT

## 2024-09-18 PROCEDURE — 85027 COMPLETE CBC AUTOMATED: CPT

## 2024-09-18 PROCEDURE — 2580000003 HC RX 258: Performed by: ANESTHESIOLOGY

## 2024-09-18 PROCEDURE — 83735 ASSAY OF MAGNESIUM: CPT

## 2024-09-18 PROCEDURE — 6370000000 HC RX 637 (ALT 250 FOR IP): Performed by: NURSE PRACTITIONER

## 2024-09-18 PROCEDURE — 99232 SBSQ HOSP IP/OBS MODERATE 35: CPT | Performed by: FAMILY MEDICINE

## 2024-09-18 PROCEDURE — 84100 ASSAY OF PHOSPHORUS: CPT

## 2024-09-18 RX ADMIN — WATER 2000 MG: 1 INJECTION INTRAMUSCULAR; INTRAVENOUS; SUBCUTANEOUS at 17:29

## 2024-09-18 RX ADMIN — Medication 10 ML: at 09:34

## 2024-09-18 RX ADMIN — HEPARIN SODIUM 5000 UNITS: 5000 INJECTION INTRAVENOUS; SUBCUTANEOUS at 17:29

## 2024-09-18 RX ADMIN — HEPARIN SODIUM 5000 UNITS: 5000 INJECTION INTRAVENOUS; SUBCUTANEOUS at 05:45

## 2024-09-18 RX ADMIN — POLYETHYLENE GLYCOL 3350 17 G: 17 POWDER, FOR SOLUTION ORAL at 05:53

## 2024-09-18 RX ADMIN — HEPARIN SODIUM 5000 UNITS: 5000 INJECTION INTRAVENOUS; SUBCUTANEOUS at 21:14

## 2024-09-18 RX ADMIN — Medication 1 TABLET: at 09:31

## 2024-09-18 RX ADMIN — Medication 10 ML: at 21:12

## 2024-09-18 RX ADMIN — PANTOPRAZOLE SODIUM 40 MG: 40 TABLET, DELAYED RELEASE ORAL at 09:31

## 2024-09-19 VITALS
HEIGHT: 68 IN | RESPIRATION RATE: 16 BRPM | DIASTOLIC BLOOD PRESSURE: 53 MMHG | OXYGEN SATURATION: 96 % | BODY MASS INDEX: 28.58 KG/M2 | TEMPERATURE: 97.9 F | SYSTOLIC BLOOD PRESSURE: 91 MMHG | WEIGHT: 188.6 LBS | HEART RATE: 99 BPM

## 2024-09-19 LAB
ALBUMIN SERPL-MCNC: 3.2 G/DL (ref 3.5–5.2)
ALP SERPL-CCNC: 89 U/L (ref 40–129)
ALT SERPL-CCNC: 6 U/L (ref 0–40)
ANION GAP SERPL CALCULATED.3IONS-SCNC: 9 MMOL/L (ref 7–16)
AST SERPL-CCNC: 24 U/L (ref 0–39)
BASOPHILS # BLD: 0.03 K/UL (ref 0–0.2)
BASOPHILS NFR BLD: 1 % (ref 0–2)
BILIRUB SERPL-MCNC: 0.5 MG/DL (ref 0–1.2)
BUN SERPL-MCNC: 20 MG/DL (ref 6–23)
CALCIUM SERPL-MCNC: 8.2 MG/DL (ref 8.6–10.2)
CHLORIDE SERPL-SCNC: 102 MMOL/L (ref 98–107)
CO2 SERPL-SCNC: 28 MMOL/L (ref 22–29)
CREAT SERPL-MCNC: 1.9 MG/DL (ref 0.7–1.2)
EOSINOPHIL # BLD: 0.09 K/UL (ref 0.05–0.5)
EOSINOPHILS RELATIVE PERCENT: 2 % (ref 0–6)
ERYTHROCYTE [DISTWIDTH] IN BLOOD BY AUTOMATED COUNT: 17.8 % (ref 11.5–15)
GFR, ESTIMATED: 34 ML/MIN/1.73M2
GLUCOSE SERPL-MCNC: 97 MG/DL (ref 74–99)
HCT VFR BLD AUTO: 24.9 % (ref 37–54)
HGB BLD-MCNC: 7.8 G/DL (ref 12.5–16.5)
IMM GRANULOCYTES # BLD AUTO: 0.03 K/UL (ref 0–0.58)
IMM GRANULOCYTES NFR BLD: 1 % (ref 0–5)
LYMPHOCYTES NFR BLD: 0.87 K/UL (ref 1.5–4)
LYMPHOCYTES RELATIVE PERCENT: 15 % (ref 20–42)
MAGNESIUM SERPL-MCNC: 1.7 MG/DL (ref 1.6–2.6)
MCH RBC QN AUTO: 33.8 PG (ref 26–35)
MCHC RBC AUTO-ENTMCNC: 31.3 G/DL (ref 32–34.5)
MCV RBC AUTO: 107.8 FL (ref 80–99.9)
MICROORGANISM SPEC CULT: NO GROWTH
MONOCYTES NFR BLD: 0.69 K/UL (ref 0.1–0.95)
MONOCYTES NFR BLD: 12 % (ref 2–12)
NEUTROPHILS NFR BLD: 70 % (ref 43–80)
NEUTS SEG NFR BLD: 4.07 K/UL (ref 1.8–7.3)
PHOSPHATE SERPL-MCNC: 1.3 MG/DL (ref 2.5–4.5)
PLATELET # BLD AUTO: 215 K/UL (ref 130–450)
PMV BLD AUTO: 10.8 FL (ref 7–12)
POTASSIUM SERPL-SCNC: 3.9 MMOL/L (ref 3.5–5)
PROT SERPL-MCNC: 7.1 G/DL (ref 6.4–8.3)
RBC # BLD AUTO: 2.31 M/UL (ref 3.8–5.8)
SERVICE CMNT-IMP: NORMAL
SODIUM SERPL-SCNC: 139 MMOL/L (ref 132–146)
SPECIMEN DESCRIPTION: NORMAL
WBC OTHER # BLD: 5.8 K/UL (ref 4.5–11.5)

## 2024-09-19 PROCEDURE — 99231 SBSQ HOSP IP/OBS SF/LOW 25: CPT | Performed by: NURSE PRACTITIONER

## 2024-09-19 PROCEDURE — 80053 COMPREHEN METABOLIC PANEL: CPT

## 2024-09-19 PROCEDURE — 84100 ASSAY OF PHOSPHORUS: CPT

## 2024-09-19 PROCEDURE — 97165 OT EVAL LOW COMPLEX 30 MIN: CPT

## 2024-09-19 PROCEDURE — 97535 SELF CARE MNGMENT TRAINING: CPT

## 2024-09-19 PROCEDURE — 6370000000 HC RX 637 (ALT 250 FOR IP): Performed by: UROLOGY

## 2024-09-19 PROCEDURE — 36415 COLL VENOUS BLD VENIPUNCTURE: CPT

## 2024-09-19 PROCEDURE — 6360000002 HC RX W HCPCS: Performed by: UROLOGY

## 2024-09-19 PROCEDURE — 83735 ASSAY OF MAGNESIUM: CPT

## 2024-09-19 PROCEDURE — 90935 HEMODIALYSIS ONE EVALUATION: CPT

## 2024-09-19 PROCEDURE — 85025 COMPLETE CBC W/AUTO DIFF WBC: CPT

## 2024-09-19 RX ORDER — CIPROFLOXACIN 250 MG/1
250 TABLET, FILM COATED ORAL 2 TIMES DAILY
Qty: 10 TABLET | Refills: 0 | Status: SHIPPED | OUTPATIENT
Start: 2024-09-19 | End: 2024-09-24

## 2024-09-19 RX ADMIN — PANTOPRAZOLE SODIUM 40 MG: 40 TABLET, DELAYED RELEASE ORAL at 08:54

## 2024-09-19 RX ADMIN — Medication 1 TABLET: at 08:54

## 2024-09-19 RX ADMIN — HEPARIN SODIUM 5000 UNITS: 5000 INJECTION INTRAVENOUS; SUBCUTANEOUS at 05:44

## 2024-09-19 RX ADMIN — ACETAMINOPHEN 650 MG: 325 TABLET ORAL at 11:36

## 2024-09-19 RX ADMIN — POLYETHYLENE GLYCOL 3350 17 G: 17 POWDER, FOR SOLUTION ORAL at 08:53

## 2024-09-19 ASSESSMENT — PAIN SCALES - GENERAL
PAINLEVEL_OUTOF10: 0
PAINLEVEL_OUTOF10: 3

## 2024-09-19 ASSESSMENT — PAIN DESCRIPTION - ORIENTATION: ORIENTATION: LOWER

## 2024-09-19 ASSESSMENT — PAIN - FUNCTIONAL ASSESSMENT: PAIN_FUNCTIONAL_ASSESSMENT: PREVENTS OR INTERFERES SOME ACTIVE ACTIVITIES AND ADLS

## 2024-09-19 ASSESSMENT — PAIN DESCRIPTION - DESCRIPTORS: DESCRIPTORS: ACHING;DISCOMFORT

## 2024-09-19 ASSESSMENT — PAIN SCALES - WONG BAKER: WONGBAKER_NUMERICALRESPONSE: NO HURT

## 2024-09-19 ASSESSMENT — PAIN DESCRIPTION - LOCATION: LOCATION: BACK

## 2024-09-20 ENCOUNTER — CARE COORDINATION (OUTPATIENT)
Dept: CARE COORDINATION | Age: 79
End: 2024-09-20

## 2024-09-20 ENCOUNTER — TELEPHONE (OUTPATIENT)
Dept: FAMILY MEDICINE CLINIC | Age: 79
End: 2024-09-20

## 2024-09-20 DIAGNOSIS — I31.39 PERICARDIAL EFFUSION: ICD-10-CM

## 2024-09-20 DIAGNOSIS — I50.9 CONGESTIVE HEART FAILURE, UNSPECIFIED HF CHRONICITY, UNSPECIFIED HEART FAILURE TYPE (HCC): Primary | ICD-10-CM

## 2024-09-20 LAB
MICROORGANISM SPEC CULT: NORMAL
MICROORGANISM SPEC CULT: NORMAL
SERVICE CMNT-IMP: NORMAL
SERVICE CMNT-IMP: NORMAL
SPECIMEN DESCRIPTION: NORMAL
SPECIMEN DESCRIPTION: NORMAL

## 2024-09-21 LAB
MICROORGANISM SPEC CULT: NORMAL
MICROORGANISM/AGENT SPEC: NORMAL
SERVICE CMNT-IMP: NORMAL
SPECIMEN DESCRIPTION: NORMAL

## 2024-09-23 ENCOUNTER — CARE COORDINATION (OUTPATIENT)
Dept: CARE COORDINATION | Age: 79
End: 2024-09-23

## 2024-09-23 DIAGNOSIS — R10.9 CHRONIC RIGHT FLANK PAIN: Primary | ICD-10-CM

## 2024-09-23 DIAGNOSIS — G89.29 CHRONIC RIGHT FLANK PAIN: Primary | ICD-10-CM

## 2024-09-23 PROCEDURE — 1111F DSCHRG MED/CURRENT MED MERGE: CPT | Performed by: INTERNAL MEDICINE

## 2024-09-28 LAB
MICROORGANISM SPEC CULT: NORMAL
MICROORGANISM SPEC CULT: NORMAL
MICROORGANISM/AGENT SPEC: NORMAL
MICROORGANISM/AGENT SPEC: NORMAL
SERVICE CMNT-IMP: NORMAL
SERVICE CMNT-IMP: NORMAL
SPECIMEN DESCRIPTION: NORMAL
SPECIMEN DESCRIPTION: NORMAL

## 2024-10-01 ENCOUNTER — APPOINTMENT (OUTPATIENT)
Dept: GENERAL RADIOLOGY | Age: 79
End: 2024-10-01
Payer: MEDICARE

## 2024-10-01 ENCOUNTER — HOSPITAL ENCOUNTER (INPATIENT)
Age: 79
LOS: 3 days | Discharge: HOME | End: 2024-10-04
Attending: EMERGENCY MEDICINE
Payer: MEDICARE

## 2024-10-01 ENCOUNTER — CARE COORDINATION (OUTPATIENT)
Dept: CARE COORDINATION | Age: 79
End: 2024-10-01

## 2024-10-01 DIAGNOSIS — K92.2 GASTROINTESTINAL HEMORRHAGE, UNSPECIFIED GASTROINTESTINAL HEMORRHAGE TYPE: ICD-10-CM

## 2024-10-01 DIAGNOSIS — N17.9 ACUTE KIDNEY INJURY (HCC): ICD-10-CM

## 2024-10-01 DIAGNOSIS — I31.39 PERICARDIAL EFFUSION: ICD-10-CM

## 2024-10-01 DIAGNOSIS — Z85.46 HISTORY OF PROSTATE CANCER: ICD-10-CM

## 2024-10-01 DIAGNOSIS — K21.9 GASTROESOPHAGEAL REFLUX DISEASE, UNSPECIFIED WHETHER ESOPHAGITIS PRESENT: ICD-10-CM

## 2024-10-01 DIAGNOSIS — D64.9 SYMPTOMATIC ANEMIA: Primary | ICD-10-CM

## 2024-10-01 LAB
ALBUMIN SERPL-MCNC: 3.3 G/DL (ref 3.5–5.2)
ALP SERPL-CCNC: 115 U/L (ref 40–129)
ALT SERPL-CCNC: 10 U/L (ref 0–40)
ANION GAP SERPL CALCULATED.3IONS-SCNC: 11 MMOL/L (ref 7–16)
AST SERPL-CCNC: 25 U/L (ref 0–39)
BASOPHILS # BLD: 0.03 K/UL (ref 0–0.2)
BASOPHILS NFR BLD: 1 % (ref 0–2)
BILIRUB SERPL-MCNC: 0.6 MG/DL (ref 0–1.2)
BNP SERPL-MCNC: 7348 PG/ML (ref 0–450)
BUN SERPL-MCNC: 29 MG/DL (ref 6–23)
CALCIUM SERPL-MCNC: 8.5 MG/DL (ref 8.6–10.2)
CHLORIDE SERPL-SCNC: 101 MMOL/L (ref 98–107)
CO2 SERPL-SCNC: 22 MMOL/L (ref 22–29)
CREAT SERPL-MCNC: 1.4 MG/DL (ref 0.7–1.2)
EKG ATRIAL RATE: 76 BPM
EKG P AXIS: 23 DEGREES
EKG P-R INTERVAL: 188 MS
EKG Q-T INTERVAL: 430 MS
EKG QRS DURATION: 106 MS
EKG QTC CALCULATION (BAZETT): 483 MS
EKG R AXIS: -53 DEGREES
EKG T AXIS: 66 DEGREES
EKG VENTRICULAR RATE: 76 BPM
EOSINOPHIL # BLD: 0.03 K/UL (ref 0.05–0.5)
EOSINOPHILS RELATIVE PERCENT: 1 % (ref 0–6)
ERYTHROCYTE [DISTWIDTH] IN BLOOD BY AUTOMATED COUNT: 16.7 % (ref 11.5–15)
GFR, ESTIMATED: 50 ML/MIN/1.73M2
GLUCOSE SERPL-MCNC: 91 MG/DL (ref 74–99)
HCT VFR BLD AUTO: 20.2 % (ref 37–54)
HCT VFR BLD AUTO: 22.2 % (ref 37–54)
HGB BLD-MCNC: 6.4 G/DL (ref 12.5–16.5)
HGB BLD-MCNC: 7.3 G/DL (ref 12.5–16.5)
LYMPHOCYTES NFR BLD: 0.48 K/UL (ref 1.5–4)
LYMPHOCYTES RELATIVE PERCENT: 17 % (ref 20–42)
MCH RBC QN AUTO: 32.5 PG (ref 26–35)
MCHC RBC AUTO-ENTMCNC: 31.7 G/DL (ref 32–34.5)
MCV RBC AUTO: 102.5 FL (ref 80–99.9)
MONOCYTES NFR BLD: 0.05 K/UL (ref 0.1–0.95)
MONOCYTES NFR BLD: 2 % (ref 2–12)
NEUTROPHILS NFR BLD: 80 % (ref 43–80)
NEUTS SEG NFR BLD: 2.32 K/UL (ref 1.8–7.3)
PLATELET # BLD AUTO: 179 K/UL (ref 130–450)
PMV BLD AUTO: 10 FL (ref 7–12)
POTASSIUM SERPL-SCNC: 4 MMOL/L (ref 3.5–5)
PROCALCITONIN SERPL-MCNC: 0.11 NG/ML (ref 0–0.08)
PROT SERPL-MCNC: 6.6 G/DL (ref 6.4–8.3)
RBC # BLD AUTO: 1.97 M/UL (ref 3.8–5.8)
RBC # BLD: ABNORMAL 10*6/UL
SODIUM SERPL-SCNC: 134 MMOL/L (ref 132–146)
WBC OTHER # BLD: 2.9 K/UL (ref 4.5–11.5)

## 2024-10-01 PROCEDURE — 85014 HEMATOCRIT: CPT

## 2024-10-01 PROCEDURE — 96374 THER/PROPH/DIAG INJ IV PUSH: CPT

## 2024-10-01 PROCEDURE — 86900 BLOOD TYPING SEROLOGIC ABO: CPT

## 2024-10-01 PROCEDURE — 90935 HEMODIALYSIS ONE EVALUATION: CPT

## 2024-10-01 PROCEDURE — P9016 RBC LEUKOCYTES REDUCED: HCPCS

## 2024-10-01 PROCEDURE — 30233N1 TRANSFUSION OF NONAUTOLOGOUS RED BLOOD CELLS INTO PERIPHERAL VEIN, PERCUTANEOUS APPROACH: ICD-10-PCS

## 2024-10-01 PROCEDURE — 85018 HEMOGLOBIN: CPT

## 2024-10-01 PROCEDURE — 6370000000 HC RX 637 (ALT 250 FOR IP)

## 2024-10-01 PROCEDURE — 87040 BLOOD CULTURE FOR BACTERIA: CPT

## 2024-10-01 PROCEDURE — 5A1D70Z PERFORMANCE OF URINARY FILTRATION, INTERMITTENT, LESS THAN 6 HOURS PER DAY: ICD-10-PCS

## 2024-10-01 PROCEDURE — 83880 ASSAY OF NATRIURETIC PEPTIDE: CPT

## 2024-10-01 PROCEDURE — 86850 RBC ANTIBODY SCREEN: CPT

## 2024-10-01 PROCEDURE — 99285 EMERGENCY DEPT VISIT HI MDM: CPT

## 2024-10-01 PROCEDURE — 6360000002 HC RX W HCPCS: Performed by: EMERGENCY MEDICINE

## 2024-10-01 PROCEDURE — 86923 COMPATIBILITY TEST ELECTRIC: CPT

## 2024-10-01 PROCEDURE — 74018 RADEX ABDOMEN 1 VIEW: CPT

## 2024-10-01 PROCEDURE — 80053 COMPREHEN METABOLIC PANEL: CPT

## 2024-10-01 PROCEDURE — 2580000003 HC RX 258

## 2024-10-01 PROCEDURE — 86901 BLOOD TYPING SEROLOGIC RH(D): CPT

## 2024-10-01 PROCEDURE — 2060000000 HC ICU INTERMEDIATE R&B

## 2024-10-01 PROCEDURE — 85025 COMPLETE CBC W/AUTO DIFF WBC: CPT

## 2024-10-01 PROCEDURE — 93010 ELECTROCARDIOGRAM REPORT: CPT | Performed by: INTERNAL MEDICINE

## 2024-10-01 PROCEDURE — 93005 ELECTROCARDIOGRAM TRACING: CPT

## 2024-10-01 PROCEDURE — 84145 PROCALCITONIN (PCT): CPT

## 2024-10-01 RX ORDER — DIPHENHYDRAMINE HCL 25 MG
50 TABLET ORAL EVERY 6 HOURS PRN
COMMUNITY

## 2024-10-01 RX ORDER — PANTOPRAZOLE SODIUM 40 MG/1
40 TABLET, DELAYED RELEASE ORAL
Status: DISCONTINUED | OUTPATIENT
Start: 2024-10-02 | End: 2024-10-01

## 2024-10-01 RX ORDER — ONDANSETRON 2 MG/ML
4 INJECTION INTRAMUSCULAR; INTRAVENOUS EVERY 6 HOURS PRN
Status: DISCONTINUED | OUTPATIENT
Start: 2024-10-01 | End: 2024-10-04 | Stop reason: HOSPADM

## 2024-10-01 RX ORDER — SODIUM CHLORIDE 9 MG/ML
INJECTION, SOLUTION INTRAVENOUS PRN
Status: DISCONTINUED | OUTPATIENT
Start: 2024-10-01 | End: 2024-10-04 | Stop reason: HOSPADM

## 2024-10-01 RX ORDER — SENNOSIDES A AND B 8.6 MG/1
1 TABLET, FILM COATED ORAL NIGHTLY
Status: DISCONTINUED | OUTPATIENT
Start: 2024-10-01 | End: 2024-10-01

## 2024-10-01 RX ORDER — POLYETHYLENE GLYCOL 3350 17 G/17G
17 POWDER, FOR SOLUTION ORAL DAILY PRN
Status: DISCONTINUED | OUTPATIENT
Start: 2024-10-01 | End: 2024-10-04 | Stop reason: HOSPADM

## 2024-10-01 RX ORDER — ONDANSETRON 8 MG/1
8 TABLET, FILM COATED ORAL EVERY 8 HOURS PRN
COMMUNITY

## 2024-10-01 RX ORDER — SODIUM CHLORIDE 0.9 % (FLUSH) 0.9 %
5-40 SYRINGE (ML) INJECTION EVERY 12 HOURS SCHEDULED
Status: DISCONTINUED | OUTPATIENT
Start: 2024-10-01 | End: 2024-10-04 | Stop reason: HOSPADM

## 2024-10-01 RX ORDER — ACETAMINOPHEN 325 MG/1
650 TABLET ORAL EVERY 6 HOURS PRN
Status: DISCONTINUED | OUTPATIENT
Start: 2024-10-01 | End: 2024-10-04 | Stop reason: HOSPADM

## 2024-10-01 RX ORDER — PANTOPRAZOLE SODIUM 40 MG/1
40 TABLET, DELAYED RELEASE ORAL
Status: DISCONTINUED | OUTPATIENT
Start: 2024-10-02 | End: 2024-10-04 | Stop reason: HOSPADM

## 2024-10-01 RX ORDER — SENNA AND DOCUSATE SODIUM 50; 8.6 MG/1; MG/1
2 TABLET, FILM COATED ORAL DAILY PRN
Status: DISCONTINUED | OUTPATIENT
Start: 2024-10-01 | End: 2024-10-04 | Stop reason: HOSPADM

## 2024-10-01 RX ORDER — ACETAMINOPHEN 650 MG/1
650 SUPPOSITORY RECTAL EVERY 6 HOURS PRN
Status: DISCONTINUED | OUTPATIENT
Start: 2024-10-01 | End: 2024-10-04 | Stop reason: HOSPADM

## 2024-10-01 RX ORDER — SODIUM CHLORIDE 0.9 % (FLUSH) 0.9 %
5-40 SYRINGE (ML) INJECTION PRN
Status: DISCONTINUED | OUTPATIENT
Start: 2024-10-01 | End: 2024-10-04 | Stop reason: HOSPADM

## 2024-10-01 RX ORDER — ONDANSETRON 4 MG/1
4 TABLET, ORALLY DISINTEGRATING ORAL EVERY 8 HOURS PRN
Status: DISCONTINUED | OUTPATIENT
Start: 2024-10-01 | End: 2024-10-04 | Stop reason: HOSPADM

## 2024-10-01 RX ORDER — PANTOPRAZOLE SODIUM 40 MG/10ML
80 INJECTION, POWDER, LYOPHILIZED, FOR SOLUTION INTRAVENOUS ONCE
Status: COMPLETED | OUTPATIENT
Start: 2024-10-01 | End: 2024-10-01

## 2024-10-01 RX ORDER — HYDROCODONE BITARTRATE AND ACETAMINOPHEN 5; 325 MG/1; MG/1
1 TABLET ORAL EVERY 6 HOURS PRN
Status: ON HOLD | COMMUNITY
End: 2024-10-04 | Stop reason: HOSPADM

## 2024-10-01 RX ORDER — METOPROLOL SUCCINATE 25 MG/1
25 TABLET, EXTENDED RELEASE ORAL DAILY
Status: DISCONTINUED | OUTPATIENT
Start: 2024-10-01 | End: 2024-10-04 | Stop reason: HOSPADM

## 2024-10-01 RX ORDER — SUCRALFATE 1 G/1
1 TABLET ORAL EVERY 6 HOURS SCHEDULED
Status: DISCONTINUED | OUTPATIENT
Start: 2024-10-01 | End: 2024-10-02

## 2024-10-01 RX ADMIN — PANTOPRAZOLE SODIUM 80 MG: 40 INJECTION, POWDER, FOR SOLUTION INTRAVENOUS at 16:11

## 2024-10-01 RX ADMIN — SUCRALFATE 1 G: 1 TABLET ORAL at 21:14

## 2024-10-01 RX ADMIN — SODIUM CHLORIDE, PRESERVATIVE FREE 10 ML: 5 INJECTION INTRAVENOUS at 21:15

## 2024-10-01 RX ADMIN — POLYETHYLENE GLYCOL-3350 AND ELECTROLYTES 4000 ML: 236; 6.74; 5.86; 2.97; 22.74 POWDER, FOR SOLUTION ORAL at 21:14

## 2024-10-01 ASSESSMENT — ENCOUNTER SYMPTOMS
NAUSEA: 0
CONSTIPATION: 0
WHEEZING: 0
ABDOMINAL DISTENTION: 0
BLOOD IN STOOL: 1
VOMITING: 0
SHORTNESS OF BREATH: 1
DIARRHEA: 0
ABDOMINAL PAIN: 0
COUGH: 0
BACK PAIN: 0
EYES NEGATIVE: 1
DIARRHEA: 0
CHEST TIGHTNESS: 0
BLOOD IN STOOL: 1
ALLERGIC/IMMUNOLOGIC NEGATIVE: 1
ANAL BLEEDING: 0
STRIDOR: 0
ABDOMINAL PAIN: 0
TROUBLE SWALLOWING: 0
NAUSEA: 0
BACK PAIN: 0
COUGH: 0
CONSTIPATION: 1
SHORTNESS OF BREATH: 0
VOMITING: 0
CONSTIPATION: 1
SORE THROAT: 0
ANAL BLEEDING: 0
RECTAL PAIN: 0
EYES NEGATIVE: 1

## 2024-10-01 ASSESSMENT — PAIN SCALES - WONG BAKER: WONGBAKER_NUMERICALRESPONSE: NO HURT

## 2024-10-01 ASSESSMENT — PAIN SCALES - GENERAL: PAINLEVEL_OUTOF10: 0

## 2024-10-01 ASSESSMENT — LIFESTYLE VARIABLES: HOW OFTEN DO YOU HAVE A DRINK CONTAINING ALCOHOL: NEVER

## 2024-10-01 NOTE — ED NOTES
Wife dropped off home medication Xtandi 40mg (2 tablets 2x day) for patient while in the hospital.  Bottle of medication given to Tomeka MARTINO.

## 2024-10-01 NOTE — H&P
[facility]  Internal Medicine Residency Program  History and Physical    Patient:  Ace Hanna 78 y.o. male MRN: [id number]     Date of Service: 10/1/2024    Hospital Day: 1      Chief complaint: had concerns including Abnormal lab (Low hemoglobin. 6.2 Missed dialysis today. ).    History Obtained From:  patient    Date of Admission: October 1, 2024  History of Present Illness   Ace Hanna is a 78 y.o. 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  Four weeks prior, he mentioned that he has been having some episodes of constipation and not feeling the urge to defecate.  Few days ago, he reportedly strained on his bowel movement and noticed that the color of the stool was dark in color.  He went into the dialysis center to have it confirmed, and they reported that there was blood in it.  He was advised to go to the emergency department for further evaluation and management.    He was recently discharged last 09/14/2024 for right flank pain, and was managed as a case of acute unilateral obstructive uropathy.  On his third day of admission, he underwent bilateral stent insertion in both kidneys, and was able to tolerate.  He was discharged stable with ciprofloxacin.  He also goes to hemodialysis 3 times daily for his ischemic ATN, which was diagnosed from his previous admission last 08/28/2024.    At the ED, blood pressure is 86/60.  Pertinent labs include elevated creatinine (Crea 1.4) hypoalbuminemia (Alb 3.3).  CBC showed macrocytic anemia (Hgb 6.4, .5), leukopenia (WBC 2.9).  EKG showed normal sinus rhythm with left axis deviation.  Nephrology was consulted and he was put on hemodialysis today.  He is then admitted for further evaluation and management.    Past Medical History:       Diagnosis

## 2024-10-01 NOTE — ED TRIAGE NOTES
Department of Emergency Medicine  FIRST PROVIDER TRIAGE NOTE             Independent MLP           10/1/24  11:44 AM EDT    Date of Encounter: 10/1/24   MRN: [id number]      HPI: Ace Hanna is a 78 y.o. male who presents to the ED for Abnormal lab (Low hemoglobin. 6.2 Missed dialysis today. )       ROS: Negative for cp, sob, Suicidal ideation, or Homicidal Ideation.    PE: Gen Appearance/Constitutional: alert  CV: regular rate  Pulm: CTA bilat     Initial Plan of Care: All treatment areas with department are currently occupied. Plan to order/Initiate the following while awaiting opening in ED: EKG.  Initiate Treatment-Testing, Proceed toTreatment Area When Bed Available for ED Attending/MLP to Continue Care    Electronically signed by MANOHAR Coto - BENI   DD: 10/1/24

## 2024-10-01 NOTE — FLOWSHEET NOTE
10/01/24 1518   Vital Signs   /81   Temp 97.2 °F (36.2 °C)   Pulse 74   Post-Hemodialysis Assessment   Post-Treatment Procedures Blood returned;Catheter capped, clamped and heparinized x 2 ports   Machine Disinfection Process Acid/Vinegar Clean;Heat Disinfect;Exterior Machine Disinfection   Rinseback Volume (ml) 300 ml   Blood Volume Processed (Liters) 28.8 L   Dialyzer Clearance Lightly streaked   Duration of Treatment (minutes) 90 minutes   Hemodialysis Intake (ml) 300 ml   Hemodialysis Output (ml) 600 ml   NET Removed (ml) 300   Tolerated Treatment Good   Patient Response to Treatment tolerated well, blood returned, cath care per policy/procedure, lines flushed, heparin instilled ports capped

## 2024-10-01 NOTE — PROGRESS NOTES
Ace Hanna was ordered Enzalutamide (Xtandi) which is a nonformulary medication.  This medication will need to be supplied by the patient as the pharmacy does not carry this non-formulary medication.    If the medication has not been administered by 1400 on the following day from the time the order was placed, a pharmacist will follow-up with the nurse of the patient to assess the capability of the patient to bring in the medication.    If it is determined that the patient cannot supply the medication and it is not available to be dispensed from the pharmacy, the provider will be notified.

## 2024-10-01 NOTE — CONSULTS
General Surgery   Consult Note      Patient's Name/Date of Birth: Ace Hanna / 1945    Date: October 1, 2024     PCP: Swetha Pope MD     Chief Complaint:   Chief Complaint   Patient presents with    Abnormal lab     Low hemoglobin. 6.2 Missed dialysis today.      HPI:   Ace Hanna is a 78 y.o. male with Hx of prostate adenocarcinoma s/p brachytherapy, neuroendocrine carcinoma diagnosed on left inguinal node biopsy with bone metastasis, Gómez's esophagus, CKD, CHF, pernicious anemia who presented for constipation x 4- 5 weeks and blood in stool.  Patient states that he was started on palliative chemotherapy about 3 weeks ago after which he developed ischemic ATN and was started on renal replacement therapy.He also had pericardial effusion and underwent a pericardial window on 8/29/24.  Reports that since then he has not had a bowel movement until yesterday night. Reports that his BM was \"thumb sized\" and had blood in it. He noticed black \"Vaseline-like\" stool after wiping.  Denies abdominal pain, nausea, vomiting, fevers, chills.  Patient has been having good p.o. intake and is passing flatus.  Last C-scope and EGD were done a year ago at [facility] and showed esophagitis, multiple benign looking polyps in the stomach gastritis, 2 ascending polyps, diverticulosis and internal/external hemorrhoids.  Patient has been taking Protonix daily.  Denies chronic NSAID use, alcohol use.  Not on any anticoagulants or antiplatelets.     In the ED patient was hypotensive initially but is currently hemodynamically stable.  Labs reviewed CR 1.4, BNP 7348, LFTs WNL, pancytopenia with macrocytic anemia Hgb 6.4, .5.  Hemoglobin improved to 7.3 s/p 1 PRBC unit.     Patient Active Problem List   Diagnosis    Hemorrhoid thrombosis    Carcinoma of prostate (HCC)    Pernicious anemia    Hyperlipidemia    Gastroesophageal reflux disease    Acute sinusitis    Hydronephrosis due to obstruction  US LYMPH NODE BIOPSY  2021    US LYMPH NODE BIOPSY 2021 [facility]    US LYMPH NODE BIOPSY  3/17/2023    US LYMPH NODE BIOPSY 3/17/2023 [facility]       Social History     Socioeconomic History    Marital status:      Spouse name: Not on file    Number of children: Not on file    Years of education: Not on file    Highest education level: Not on file   Occupational History    Not on file   Tobacco Use    Smoking status: Former     Current packs/day: 0.00     Average packs/day: 1 pack/day for 5.0 years (5.0 ttl pk-yrs)     Types: Cigarettes     Start date: 1981     Quit date: 1986     Years since quittin.8    Smokeless tobacco: Never   Substance and Sexual Activity    Alcohol use: No    Drug use: No    Sexual activity: Not Currently   Other Topics Concern    Not on file   Social History Narrative    Not on file     Social Determinants of Health     Financial Resource Strain: Low Risk  (2024)    Overall Financial Resource Strain (CARDIA)     Difficulty of Paying Living Expenses: Not hard at all   Food Insecurity: No Food Insecurity (9/15/2024)    Hunger Vital Sign     Worried About Running Out of Food in the Last Year: Never true     Ran Out of Food in the Last Year: Never true   Transportation Needs: No Transportation Needs (9/15/2024)    PRAPARE - Transportation     Lack of Transportation (Medical): No     Lack of Transportation (Non-Medical): No   Physical Activity: Insufficiently Active (2023)    Exercise Vital Sign     Days of Exercise per Week: 3 days     Minutes of Exercise per Session: 30 min   Stress: Not on file   Social Connections: Not on file   Intimate Partner Violence: Not on file   Housing Stability: Low Risk  (9/15/2024)    Housing Stability Vital Sign     Unable to Pay for Housing in the Last Year: No     Number of Times Moved in the Last Year: 0     Homeless in the Last Year: No       The patient has a family history that is negative for severe

## 2024-10-01 NOTE — PROGRESS NOTES
[facility]  Internal Medicine Residency Program  History and Physical    Patient:  Ace Hanna 78 y.o. male MRN: [id number]     Date of Service: 10/1/2024    Hospital Day: 1      Chief complaint: had concerns including Abnormal lab (Low hemoglobin. 6.2 Missed dialysis today. ).    History Obtained From:  patient    Date of Admission: October 1, 2024  History of Present Illness   Ace Hanna is a 78 y.o. 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  Four weeks prior, he mentioned that he has been having some episodes of constipation and not feeling the urge to defecate.  Few days ago, he reportedly strained on his bowel movement and noticed that the color of the stool was dark in color.  He went into the dialysis center to have it confirmed, and they reported that there was blood in it.  He was advised to go to the emergency department for further evaluation and management.    He was recently discharged last 09/14/2024 for right flank pain, and was managed as a case of acute unilateral obstructive uropathy.  On his third day of admission, he underwent bilateral stent insertion in both kidneys, and was able to tolerate.  He was discharged stable with ciprofloxacin.  He also goes to hemodialysis 3 times daily for his ischemic ATN, which was diagnosed from his previous admission last 08/28/2024.    At the ED, blood pressure is 86/60.  Pertinent labs include elevated creatinine (Crea 1.4) hypoalbuminemia (Alb 3.3).  CBC showed macrocytic anemia (Hgb 6.4, .5), leukopenia (WBC 2.9).  EKG showed normal sinus rhythm with left axis deviation.  Nephrology was consulted and he was put on hemodialysis today.  He is then admitted for further evaluation and management.    Past Medical History:       Diagnosis  Date    Acid reflux     Barrette esophagus     Gómez esophagus     BPH (benign prostatic hyperplasia)     Cancer (HCC) 06/2021    prostrate cancer     H/O cardiovascular stress test 05/03/2023    Nuclear Lexiscan Stress Test    [place] (hard of hearing)     bilateral hearing aids    Pernicious anemia        Past Surgical History:        Procedure Laterality Date    APPENDECTOMY      BLADDER SURGERY Left 3/16/2023    CYSTOSCOPY RETROGRADE PYELOGRAM LEFT STENT INSERTION **CALL WITH TIME** performed by Nathan López MD at [facility] OR    CARDIAC SURGERY N/A 8/29/2024    PERICARDIAL WINDOW CREATION, NICOLE performed by Yumiko Klein DO at [facility] OR    CARPAL TUNNEL RELEASE Right     CATHETER INSERTION N/A 2/22/2022    MEDIPORT CATHETER INSERTION performed by Antoine Brothers MD at [facility] OR    COLONOSCOPY  11/02/2016    DEBRIDEMENT Bilateral 9/17/2024    CYSTOSCOPY RETROGRADE PYELOGRAM BILATERAL STENT INSERTION performed by Buck López MD at [facility] OR    IR TUNNELED CATHETER PLACEMENT GREATER THAN 5 YEARS  9/5/2024    IR TUNNELED CATHETER PLACEMENT GREATER THAN 5 YEARS 9/5/2024 JoeyOfelia MD [facility] SPECIAL PROCEDURES    KNEE SURGERY Right     TONSILLECTOMY      US LYMPH NODE BIOPSY  12/30/2021    US LYMPH NODE BIOPSY 12/30/2021 [facility] ULTRASOUND    US LYMPH NODE BIOPSY  3/17/2023    US LYMPH NODE BIOPSY 3/17/2023 [facility] ULTRASOUND       Medications Prior to Admission:    Prior to Admission medications    Medication Sig Start Date End Date Taking? Authorizing Provider   diphenhydrAMINE (BENADRYL) 25 MG tablet Take 2 tablets by mouth every 6 hours as needed for Sleep   Yes Vipin Valiente MD   pantoprazole (PROTONIX) 40 MG tablet Take 1 tablet by mouth daily 7/11/24  Yes Jory Tillman MD   enzalutamide (XTANDI) 40 MG capsule Take 2 capsules by mouth 2 times daily   Yes Vipin Valiente MD   HYDROcodone-acetaminophen (NORCO) 5-325 MG per tablet Take 1 tablet by mouth every 6 hours as needed

## 2024-10-01 NOTE — CONSULTS
Department of Internal Medicine  Nephrology Attending Consult Note      Reason for Consult: THU on hemodialysis  Requesting Physician:  Jolene Duarte    CHIEF COMPLAINT: Abnormal lab, low hemoglobin    History Obtained From:  patient, electronic medical record    HISTORY OF PRESENT ILLNESS:  Briefly, Mr. Ace Hanna is a 77 yo male with a PMH of left nephrolithiasis, HFpEF 45-50%, pernicious anemia, prostate cancer status post brachytherapy, metastatic poorly differentiated neuroendocrine carcinoma, Gómez's esophagus, who was recently admitted on 8/28/24 after he presented to the ED with complaints of chest pain and SOB. CTA of the chest showed a large pericardial effusion and he underwent a pericardial window on 8/29/24, he was found to have malignant pericardial effusion. Of note, on 8/30/24, an RRT was called due to hypotension. On admission, his creatinine was 2.2 mg/dL and had increased to 4.8 mg/dL. Patient developed ischemic ATN and therefore he was started on renal replacement therapy, he had a right IJ vein TDC placement on 9/5/2024. He has well was found to have a soft tissue mass in the left perinephric region 7.6 cm slightly malignancy. Patient was readmitted on 9/14/2024 to [facility] after he presented to the ER with right flank pain, CAT scan of abdomen and pelvis showed moderate distention of the right renal collecting system.  Patient was discharged from the hospital on September 19, 2024, and he was readmitted on 10/1/2024 after he was referred to the ER because of her low hemoglobin level.  Patient reports having constipation for the last 4 days.    Past Medical History:        Diagnosis Date    Acid reflux     Barrette esophagus     Gómez esophagus     BPH (benign prostatic hyperplasia)     Cancer (HCC) 06/2021    prostrate cancer     H/O cardiovascular stress test 05/03/2023    Nuclear Lexiscan Stress Test    [place] (hard of hearing)     bilateral hearing aids

## 2024-10-01 NOTE — PROGRESS NOTES
[facility]  Internal Medicine Residency Program  Progress Note - House Team 1    Patient:  Ace Hanna 78 y.o. male MRN: [id number]     Date of Service: 10/2/2024     CC: bloody stools    Days since admission: 1    Subjective     Overnight events: No acute overnight events    He was seen and examined at bedside this morning in no acute distress.  He mentioned that he had a bowel movement yesterday without noting any blood.  He also noted that he had some dyspnea on exertion, which has persisted from when he was previously admitted.  He is breathing room air. He denied fever, cough, colds, chills, chest pain, abdominal pain, flank pain, diarrhea, or constipation.  He is able to urinate and move his bowels appropriately.  He is able to ambulate but he noted that he has been having some shortness of breath right after.    Objective     Physical Exam:  Vitals: BP 90/61   Pulse 79   Temp 97.7 °F (36.5 °C) (Temporal)   Resp 18   Ht 1.702 m (5' 7\")   Wt 81.6 kg (180 lb)   SpO2 91%   BMI 28.19 kg/m²     I & O - 24hr:   Intake/Output Summary (Last 24 hours) at 10/2/2024 0837  Last data filed at 10/1/2024 1524  Gross per 24 hour   Intake 574.17 ml   Output 600 ml   Net -25.83 ml      General Appearance: alert, appears stated age, and cooperative  HEENT:  Head: Normocephalic, no lesions, without obvious abnormality.  Neck: no adenopathy, no carotid bruit, no JVD, supple, symmetrical, trachea midline, and thyroid not enlarged, symmetric, no tenderness/mass/nodules  Lung: clear to auscultation bilaterally  Heart: regular rate and rhythm, S1, S2 normal, no murmur, click, rub or gallop  Abdomen: soft, non-tender; bowel sounds normal; no masses,  no organomegaly  Extremities:  edema bipedal grade 2+  Musculokeletal: No joint swelling, no muscle tenderness. ROM normal in all joints of extremities.   Neurologic: Mental status: Alert, oriented, thought content appropriate    Pertinent Information &  hemodialysis  Creatinine has been trending down  Crea 1.4 -> 1.1  Plan:  Nephrology on board:  Discontinue hemodialysis due to significant recovery of renal function.  IR consulted for removal of TDC  Monitor kidney function  Strict input and output  Avoid nephrotoxic drugs     Concern on leukopenia likely secondary to chemotherapeutic drug (Xtandi), rule out infection  WBC 2.9 -> 2.9  Procalcitonin 0.11  Blood cultures has no growth  Plan:  Follow cultures  Monitor CBCs regularly     Acute unilateral obstructive uropathy s/p cystoscopy retrograde pyelogram bilateral stent insertion (09/17/2024)  On previous admission, patient was admitted because of right flank pain, in which she underwent stenting  Patient did not note any recurrence of right flank pain  Plan:  Watch out for recurrence of symptoms     Heart failure with preserved ejection fraction  Recent ECHO (09/16/2024): EF 55%.  Mildly reduced systolic function of the right ventricle  EKG showed normal sinus rhythm, left axis deviation  NT ProBNP 7,348  Inpatient regimen: Metoprolol succinate (Toprol-XL) 25 mg once daily held for low BP  Plan:  Nephrology on board: Continue metoprolol succinate  Watch out for heart failure symptoms                        Pericardial Effusion likely 2/2 metastatic prostate adenocarcinoma s/p subxiphoid pericardial window creation (08/29/2024), stable  On previous admission, patient presented with chest pain and shortness of breath, and was found to have pericardial effusion. He noted improvement of symptoms after pericardial window creation.  Plan:  Watch out for recurrence of symptoms     Metastatic Prostate Adenocarcinoma s/p brachytherapy, hx of metastatic poorly differentiated neuroendocrine carcinoma  Patient initially started chemotherapy in 2022. After a relapse in March 2023, treatment was restarted with Carboplatin, Etoposide, and Tecentriq, and is currently maintained with Tecentriq alone every 3 weeks, starting July

## 2024-10-02 ENCOUNTER — APPOINTMENT (OUTPATIENT)
Dept: INTERVENTIONAL RADIOLOGY/VASCULAR | Age: 79
End: 2024-10-02
Payer: MEDICARE

## 2024-10-02 PROBLEM — D64.9 SYMPTOMATIC ANEMIA: Status: ACTIVE | Noted: 2024-10-02

## 2024-10-02 LAB
ANION GAP SERPL CALCULATED.3IONS-SCNC: 10 MMOL/L (ref 7–16)
BASOPHILS # BLD: 0.03 K/UL (ref 0–0.2)
BASOPHILS NFR BLD: 1 % (ref 0–2)
BUN SERPL-MCNC: 19 MG/DL (ref 6–23)
CALCIUM SERPL-MCNC: 8.1 MG/DL (ref 8.6–10.2)
CHLORIDE SERPL-SCNC: 102 MMOL/L (ref 98–107)
CO2 SERPL-SCNC: 23 MMOL/L (ref 22–29)
CREAT SERPL-MCNC: 1.1 MG/DL (ref 0.7–1.2)
EOSINOPHIL # BLD: 0.04 K/UL (ref 0.05–0.5)
EOSINOPHILS RELATIVE PERCENT: 1 % (ref 0–6)
ERYTHROCYTE [DISTWIDTH] IN BLOOD BY AUTOMATED COUNT: 17.9 % (ref 11.5–15)
GFR, ESTIMATED: 66 ML/MIN/1.73M2
GLUCOSE SERPL-MCNC: 91 MG/DL (ref 74–99)
HCT VFR BLD AUTO: 21.5 % (ref 37–54)
HGB BLD-MCNC: 7.1 G/DL (ref 12.5–16.5)
IMM GRANULOCYTES # BLD AUTO: 0.05 K/UL (ref 0–0.58)
IMM GRANULOCYTES NFR BLD: 2 % (ref 0–5)
LYMPHOCYTES NFR BLD: 0.6 K/UL (ref 1.5–4)
LYMPHOCYTES RELATIVE PERCENT: 21 % (ref 20–42)
MAGNESIUM SERPL-MCNC: 1.7 MG/DL (ref 1.6–2.6)
MCH RBC QN AUTO: 32.4 PG (ref 26–35)
MCHC RBC AUTO-ENTMCNC: 33 G/DL (ref 32–34.5)
MCV RBC AUTO: 98.2 FL (ref 80–99.9)
MONOCYTES NFR BLD: 0.11 K/UL (ref 0.1–0.95)
MONOCYTES NFR BLD: 4 % (ref 2–12)
NEUTROPHILS NFR BLD: 71 % (ref 43–80)
NEUTS SEG NFR BLD: 2.04 K/UL (ref 1.8–7.3)
PLATELET # BLD AUTO: 141 K/UL (ref 130–450)
PMV BLD AUTO: 10 FL (ref 7–12)
POTASSIUM SERPL-SCNC: 3.5 MMOL/L (ref 3.5–5)
RBC # BLD AUTO: 2.19 M/UL (ref 3.8–5.8)
SODIUM SERPL-SCNC: 135 MMOL/L (ref 132–146)
WBC OTHER # BLD: 2.9 K/UL (ref 4.5–11.5)

## 2024-10-02 PROCEDURE — 36415 COLL VENOUS BLD VENIPUNCTURE: CPT

## 2024-10-02 PROCEDURE — 2060000000 HC ICU INTERMEDIATE R&B

## 2024-10-02 PROCEDURE — 85025 COMPLETE CBC W/AUTO DIFF WBC: CPT

## 2024-10-02 PROCEDURE — 99232 SBSQ HOSP IP/OBS MODERATE 35: CPT | Performed by: SURGERY

## 2024-10-02 PROCEDURE — 99221 1ST HOSP IP/OBS SF/LOW 40: CPT | Performed by: INTERNAL MEDICINE

## 2024-10-02 PROCEDURE — 0JPT0XZ REMOVAL OF TUNNELED VASCULAR ACCESS DEVICE FROM TRUNK SUBCUTANEOUS TISSUE AND FASCIA, OPEN APPROACH: ICD-10-PCS

## 2024-10-02 PROCEDURE — 36589 REMOVAL TUNNELED CV CATH: CPT

## 2024-10-02 PROCEDURE — 6370000000 HC RX 637 (ALT 250 FOR IP)

## 2024-10-02 PROCEDURE — 2709999900 IR REMOVE TUNNELED CVAD WO SQ PORT/PUMP

## 2024-10-02 PROCEDURE — 2580000003 HC RX 258

## 2024-10-02 PROCEDURE — 80048 BASIC METABOLIC PNL TOTAL CA: CPT

## 2024-10-02 PROCEDURE — 05PY33Z REMOVAL OF INFUSION DEVICE FROM UPPER VEIN, PERCUTANEOUS APPROACH: ICD-10-PCS

## 2024-10-02 PROCEDURE — 83735 ASSAY OF MAGNESIUM: CPT

## 2024-10-02 PROCEDURE — 77001 FLUOROGUIDE FOR VEIN DEVICE: CPT

## 2024-10-02 RX ORDER — PANTOPRAZOLE SODIUM 40 MG/1
40 TABLET, DELAYED RELEASE ORAL
Qty: 28 TABLET | Refills: 0 | Status: CANCELLED | OUTPATIENT
Start: 2024-10-02 | End: 2024-10-16

## 2024-10-02 RX ADMIN — SODIUM CHLORIDE, PRESERVATIVE FREE 10 ML: 5 INJECTION INTRAVENOUS at 08:30

## 2024-10-02 RX ADMIN — PANTOPRAZOLE SODIUM 40 MG: 40 TABLET, DELAYED RELEASE ORAL at 05:10

## 2024-10-02 RX ADMIN — PANTOPRAZOLE SODIUM 40 MG: 40 TABLET, DELAYED RELEASE ORAL at 17:03

## 2024-10-02 RX ADMIN — SUCRALFATE 1 G: 1 TABLET ORAL at 05:10

## 2024-10-02 RX ADMIN — SODIUM CHLORIDE, PRESERVATIVE FREE 10 ML: 5 INJECTION INTRAVENOUS at 19:44

## 2024-10-02 ASSESSMENT — PAIN SCALES - GENERAL: PAINLEVEL_OUTOF10: 0

## 2024-10-02 NOTE — PROGRESS NOTES
GENERAL SURGERY  DAILY PROGRESS NOTE    Patient's Name/Date of Birth: Ace Hanna / 1945    Date: 2024     Chief Complaint   Patient presents with    Abnormal lab     Low hemoglobin. 6.2 Missed dialysis today.         Subjective:  Patient had 3 bowel movements yesterday after drinking the Golytely and noticed some bright red blood when he was wiping after his first bowel movement.  Otherwise denies any other complaints.  No abdominal pain, nausea, vomiting.    Objective:  Last 24Hrs  Temp  Av.5 °F (36.4 °C)  Min: 97.2 °F (36.2 °C)  Max: 97.8 °F (36.6 °C)  Resp  Av.8  Min: 16  Max: 20  Pulse  Av.8  Min: 74  Max: 92  Systolic (24hrs), Av , Min:86 , Max:124     Diastolic (24hrs), Av, Min:58, Max:81    SpO2  Av.2 %  Min: 91 %  Max: 98 %    I/O last 3 completed shifts:  In: 574.2 [Blood:274.2]  Out: 600       General: In no acute distress, alert and oriented x4  Cardiovascular: Warm throughout, no edema  Respiratory: no respiratory distress, equal chest rise  Abdomen: soft, nontender, nondistended  Skin: no obvious rashes or lesions appreciated.     CBC  Recent Labs     10/01/24  1225 10/01/24  1651 10/02/24  0459   WBC 2.9*  --  2.9*   RBC 1.97*  --  2.19*   HGB 6.4* 7.3* 7.1*   HCT 20.2* 22.2* 21.5*   .5*  --  98.2   MCH 32.5  --  32.4   MCHC 31.7*  --  33.0   RDW 16.7*  --  17.9*     --  141   MPV 10.0  --  10.0       CMP  Recent Labs     10/01/24  1225 10/02/24  0459    135   K 4.0 3.5    102   CO2 22 23   BUN 29* 19   CREATININE 1.4* 1.1   GLUCOSE 91 91   CALCIUM 8.5* 8.1*   BILITOT 0.6  --    ALKPHOS 115  --    AST 25  --    ALT 10  --          Assessment/Plan:    Patient Active Problem List   Diagnosis    Hemorrhoid thrombosis    Carcinoma of prostate (HCC)    Pernicious anemia    Hyperlipidemia    Gastroesophageal reflux disease    Acute sinusitis    Hydronephrosis due to obstruction of ureter    Hydronephrosis of left kidney    Cancer

## 2024-10-02 NOTE — CARE COORDINATION
Readmission Assessment  Number of Days since last admission?: 8-30 days  Previous Disposition: Home with Family ([facility])  Who is being Interviewed: Patient  What was the patient's/caregiver's perception as to why they think they needed to return back to the hospital?: Other (Comment) (GI Bleed, Weakness, fatigue)  Did you visit your Primary Care Physician after you left the hospital, before you returned this time?: (P) No (Last phone 9/20)  Why weren't you able to visit your PCP?: (P) Could not get an appointment  Did you see a specialist, such as Cardiac, Pulmonary, Orthopedic Physician, etc. after you left the hospital?: (P) Yes  Who advised the patient to return to the hospital?: (P) Self-referral  Does the patient report anything that got in the way of taking their medications?: (P) No  In our efforts to provide the best possible care to you and others like you, can you think of anything that we could have done to help you after you left the hospital the first time, so that you might not have needed to return so soon?: (P) Improved written discharge instructions, Discharge instructions that are concise, clear, and non contradictory        Doris Teixeira RN,.

## 2024-10-02 NOTE — ED PROVIDER NOTES
[facility] IMCU/NEURO  EMERGENCY DEPARTMENT ENCOUNTER        Pt Name: Ace Hanna  MRN: [id number]  Birthdate 1945  Date of evaluation: 10/1/2024  Provider: Dereje Greer DO  PCP: Swetha Pope MD  Note Started: 10:32 PM EDT 10/1/24    CHIEF COMPLAINT       Chief Complaint   Patient presents with    Abnormal lab     Low hemoglobin. 6.2 Missed dialysis today.        HISTORY OF PRESENT ILLNESS: 1 or more Elements   History From: patient    Limitations to history : None    Ace Hanna is a 78 y.o. male who presents to the emergency department for abnormal lab from nephrology office.  Patient has a history of prostate cancer and recently started chemotherapy.  He is ESRD on dialysis, missed dialysis treatment today to come to the emergency department.  He describes progressive weakness and fatigue, also reports some dark stools with blood present. Patient denies fever, chills, headache, shortness of breath, chest pain, abdominal pain, nausea, vomiting, diarrhea.  Hemoglobin was 6.2.    Nursing Notes were all reviewed and agreed with or any disagreements were addressed in the HPI.        REVIEW OF SYSTEMS :           Positives and Pertinent negatives as per HPI.     SURGICAL HISTORY     Past Surgical History:   Procedure Laterality Date    APPENDECTOMY      BLADDER SURGERY Left 3/16/2023    CYSTOSCOPY RETROGRADE PYELOGRAM LEFT STENT INSERTION **CALL WITH TIME** performed by Nathan López MD at [facility] OR    CARDIAC SURGERY N/A 8/29/2024    PERICARDIAL WINDOW CREATION, NICOLE performed by Yumiko Klein DO at [facility] OR    CARPAL TUNNEL RELEASE Right     CATHETER INSERTION N/A 2/22/2022    MEDIPORT CATHETER INSERTION performed by Antoine Brothers MD at [facility] OR    COLONOSCOPY  11/02/2016    DEBRIDEMENT Bilateral 9/17/2024    CYSTOSCOPY RETROGRADE PYELOGRAM BILATERAL STENT INSERTION performed by Buck López MD at [facility] OR    IR TUNNELED CATHETER PLACEMENT GREATER THAN 5 YEARS  9/5/2024  has had GI bleeding in the past.  Recently started chemotherapy approximately 1 week ago.  Patient believes he has not GI bleeding again today, Hemoccult was positive for dark stools.  No external hemorrhoids on rectal exam.  Patient's labs show hemoglobin 6.4, ordered a unit to transfuse blood.  Potassium was 4.0.  Discussed case with Dr. Shepherd, he will provide consultation for safe dialysis with the patient is admitted.  Discussed case Dr. Johns accept the patient for admission for GI bleed with history of cancer on chemotherapy.      CONSULTS: (Who and What was discussed)  IP CONSULT TO INTERNAL MEDICINE  IP CONSULT TO NEPHROLOGY  IP CONSULT TO INTERNAL MEDICINE  IP CONSULT TO GENERAL SURGERY        FINAL IMPRESSION      1. Symptomatic anemia    2. Gastrointestinal hemorrhage, unspecified gastrointestinal hemorrhage type    3. History of prostate cancer          DISPOSITION/PLAN     DISPOSITION Admitted 10/01/2024 04:58:25 PM    DISPOSITION  Disposition: Admit to med/surg floor  Patient condition is stable    10/1/24, 10:32 PM EDT.    Dereje Greer DO  Emergency Medicine    PATIENT REFERRED TO:  No follow-up provider specified.    DISCHARGE MEDICATIONS:  Current Discharge Medication List          DISCONTINUED MEDICATIONS:  Current Discharge Medication List        STOP taking these medications       metoprolol succinate (TOPROL XL) 25 MG extended release tablet Comments:   Reason for Stopping:         EPINEPHrine (EPIPEN 2-YULIYA) 0.3 MG/0.3ML SOAJ injection Comments:   Reason for Stopping:                      (Please note that portions of this note were completed with a voice recognition program.  Efforts were made to edit the dictations but occasionally words are mis-transcribed.)    Dereje Greer DO (electronically signed)

## 2024-10-02 NOTE — BRIEF OP NOTE
Brief-Op Note  ______________________________________________________________      IR TUNNELED HD CATHETER REMOVAL  [facility] IMCU/NEURO    Patient Name: Ace Hanna   YOB: 1945  Medical Record Number: [id number]  Date of Procedure: 10/2/24  Room/Bed: [address] [place] [address]      Preoperative diagnosis: Removal of Tunneled HD Catheter.    Postoperative diagnosis: Same.    Consent: INFORMED CONSENT WAS OBTAINED BY patient, RISK AND BENEFITS WERE DISCUSSED.     Procedure: Removal of tunneled central catheter.    Anesthesia: Local anesthesia administered subcutaneously.    Performed by: MARTIN Hardin under on-site supervision by Bin Sharma MD.    Estimated blood loss: Less than 10 mL    Complications: None    Implants: None      Electronically signed by MARTIN Hardin   DD: 10/2/24  12:41 PM

## 2024-10-02 NOTE — PROGRESS NOTES
Department of Internal Medicine  Nephrology Attending Consult Note      Events reviewed.    SUBJECTIVE: We are following Mr. Hanna for THU. Patient reports no new complaints today.    PHYSICAL EXAM:      Vitals:    VITALS:  BP (!) 87/56   Pulse 82   Temp 97.3 °F (36.3 °C) (Temporal)   Resp 24   Ht 1.702 m (5' 7\")   Wt 81.6 kg (180 lb)   SpO2 92%   BMI 28.19 kg/m²   24HR INTAKE/OUTPUT:  No intake or output data in the 24 hours ending 10/02/24 1754      Constitutional: Patient awake, alert in no distress, seen on dialysis  HEENT: Pupils are equal reactive, mucous membranes moist  Respiratory: Lungs are clear  Cardiovascular/Edema: Heart sounds are regular  Gastrointestinal: Abdomen soft  Neurologic: No focal  Skin: No lesions  Other: No edema    Scheduled Meds:   sodium chloride flush  5-40 mL IntraVENous 2 times per day    enzalutamide  80 mg Oral BID    [Held by provider] metoprolol succinate  25 mg Oral Daily    pantoprazole  40 mg Oral BID AC     Continuous Infusions:   sodium chloride      sodium chloride       PRN Meds:.sodium chloride, sodium chloride flush, sodium chloride, ondansetron **OR** ondansetron, polyethylene glycol, acetaminophen **OR** acetaminophen, sennosides-docusate sodium      DATA:    CBC:   Lab Results   Component Value Date/Time    WBC 2.9 10/02/2024 04:59 AM    RBC 2.19 10/02/2024 04:59 AM    HGB 7.1 10/02/2024 04:59 AM    HCT 21.5 10/02/2024 04:59 AM    MCV 98.2 10/02/2024 04:59 AM    MCH 32.4 10/02/2024 04:59 AM    MCHC 33.0 10/02/2024 04:59 AM    RDW 17.9 10/02/2024 04:59 AM     10/02/2024 04:59 AM    MPV 10.0 10/02/2024 04:59 AM     CMP:    Lab Results   Component Value Date/Time     10/02/2024 04:59 AM    K 3.5 10/02/2024 04:59 AM    K 4.4 02/25/2020 08:34 AM     10/02/2024 04:59 AM    CO2 23 10/02/2024 04:59 AM    BUN 19 10/02/2024 04:59 AM    CREATININE 1.1 10/02/2024 04:59 AM    CREATININE 1.2 12/30/2019 12:00 AM    GFRAA >60 02/21/2022 01:04 PM    LABGLOM  66 10/02/2024 04:59 AM    LABGLOM 59 04/23/2024 10:30 AM    GLUCOSE 91 10/02/2024 04:59 AM    GLUCOSE 102 02/24/2012 08:38 AM    CALCIUM 8.1 10/02/2024 04:59 AM    BILITOT 0.6 10/01/2024 12:25 PM    ALKPHOS 115 10/01/2024 12:25 PM    AST 25 10/01/2024 12:25 PM    ALT 10 10/01/2024 12:25 PM     Magnesium:    Lab Results   Component Value Date/Time    MG 1.7 10/02/2024 04:59 AM     Phosphorus:    Lab Results   Component Value Date/Time    PHOS 1.3 09/19/2024 04:47 AM     Radiology Review:          BRIEF SUMMARY OF INITIAL CONSULT:    Briefly, Mr. Ace Hanna is a 79 yo male with a PMH of left nephrolithiasis, HFpEF 45-50%, pernicious anemia, prostate cancer status post brachytherapy, metastatic poorly differentiated neuroendocrine carcinoma, Gómez's esophagus, who was recently admitted on 8/28/24 after he presented to the ED with complaints of chest pain and SOB. CTA of the chest showed a large pericardial effusion and he underwent a pericardial window on 8/29/24, he was found to have malignant pericardial effusion. Of note, on 8/30/24, an RRT was called due to hypotension. On admission, his creatinine was 2.2 mg/dL and had increased to 4.8 mg/dL. Patient developed ischemic ATN and therefore he was started on renal replacement therapy, he had a right IJ vein TDC placement on 9/5/2024. He has well was found to have a soft tissue mass in the left perinephric region 7.6 cm slightly malignancy. Patient was readmitted on 9/14/2024 to [facility] after he presented to the ER with right flank pain, CAT scan of abdomen and pelvis showed moderate distention of the right renal collecting system.  Patient was discharged from the hospital on September 19, 2024, and he was readmitted on 10/1/2024 after he was referred to the ER because of her low hemoglobin level.  Patient reports having constipation for the last 4 days.    IMPRESSION/RECOMMENDATIONS:     THU stage III, ischemic ATN due to episode of hypotension on

## 2024-10-02 NOTE — DISCHARGE INSTRUCTIONS
Internal medicine    Follow ups  Please follow up with your primary care physician within 10 days of discharge from hospital. Please call as soon as possible to make an appointment. Please contact the internal medicine clinic for an appointment if you are unable to get an appointment with your PCP.   Please keep all other follow up appointments:  Future Appointments   Date Time Provider Department Center   11/21/2024  3:30 PM Swetha Pope MD [facility] DEP        Changes in healthcare   Please take all medications as indicated  Diet: cardiac diet   Activity: activity as tolerated  New Medications started during this hospital stay  None  Changes to your medications  Take protonix 40 mg twice a day, 30 minutes before meals for 4 weeks then just continue it once a day  Medications you should stop taking   No NSAIDs (Aleve, Ibuprofen)  Additional labs, testing or imaging needed after discharge   None  Please contact us if you have any concerns, wish to change or make an appointment:  Internal medicine clinic   Phone: [phone]  Fax: [phone]  [facility] [id number]  Should you have further questions in regards to this visit, you can review your clinical note and after visit summary document on your [employer] account.     Other than any new prescriptions given to you today, the list of home medications on this After Visit Summary are based on information provided to us from you and your healthcare providers. This information, including the list, dose, and frequency of medications is only as accurate as the information you provided. If you have any questions or concerns about your home medications, please contact your Primary Care Physician for further clarification.

## 2024-10-02 NOTE — CARE COORDINATION
Chart reviewed and case reviewed in IDR.  Patient admitted with GI bleed, chronic macro anemia.  Hgb 6.4 on admission and 1 unit PRBC given in HD yesterday.  Repeat 7.1 this am.  Per Nephrology, no longer need to be on HD as kidney functions have rebounded.  Tunneled HD catheter removed today in IR.  Hypotension noted in vitals this afternoon.  Patient with + BM and stated he is feeling better to surgery.  Met with the patient at the bedside to discuss transition of care plans.  Patient lives with his wife Maddi in a one story home with a three entry.  Patient has a walker, BSC, and shower bench at home.  Patient is currently active with [facility] .  His primary care provider is Dr HOLLIE Mane and they use [facility] in [place] for their medications.  Patient does plan to return home when medically stable to transition.  Spoke with Briseida, liaison with [facility]  and they will just need OSWALDO orders.  Will continue to follow for further transition of care planning needs.       Doris Teixeira RN.  P:  [phone]        Case Management Assessment  Initial Evaluation    Date/Time of Evaluation: 10/2/2024 2:22 PM  Assessment Completed by: Doris Teixeira RN    If patient is discharged prior to next notation, then this note serves as note for discharge by case management.    Patient Name: Ace Hanna                   YOB: 1945  Diagnosis: GI bleed [K92.2]                   Date / Time: 10/1/2024 11:49 AM    Patient Admission Status: Inpatient   Readmission Risk (Low < 19, Mod (19-27), High > 27): Readmission Risk Score: 22    Current PCP: Swetha Pope MD  PCP verified by CM? Yes (Swetha Pope MD)    Chart Reviewed: Yes      History Provided by: Patient  Patient Orientation: Alert and Oriented    Patient Cognition: Alert    Hospitalization in the last 30 days (Readmission):  Yes    If yes, Readmission Assessment in  Navigator will be completed.    Advance Directives:      Code  F [phone]  [address] [address]  Phone: [phone] Fax: [phone]      Notes:    Factors facilitating achievement of predicted outcomes: Family support, Motivated, Cooperative, Pleasant, and Has needed Durable Medical Equipment at home    Barriers to discharge: Lower extremity weakness and Stairs at home    Additional Case Management Notes: See Above     The Plan for Transition of Care is related to the following treatment goals of GI bleed [K92.2]    IF APPLICABLE: The Patient and/or patient representative Ace and his family were provided with a choice of provider and agrees with the discharge plan. Freedom of choice list with basic dialogue that supports the patient's individualized plan of care/goals and shares the quality data associated with the providers was provided to:     Patient Representative Name:       The Patient and/or Patient Representative Agree with the Discharge Plan?      Doris Teixeira RN  Case Management Department  Ph: [phone]   Fax: [phone]

## 2024-10-02 NOTE — PROCEDURES
PROCEDURE NOTE  Date: 10/2/2024   Name: Ace Hanna  YOB: 1945    Procedures: HD removal    Patient arrived to Radiology department for HD removal. Allergies, home medications, H&P and fasting instructions reviewed with patient. Vital signs taken.  Procedural instructions given, questions answered, understanding expressed and consent signed. Patient given fluoroscopy education, no questions at this time.

## 2024-10-02 NOTE — OP NOTE
Operative Note  ______________________________________________________________      IR REMOVAL OF TUNNELED HD CATHETER  SEYZ 8SE IMCU/NEURO    Patient Name: Ace Hanna   YOB: 1945  Medical Record Number: [id number]  Date of Procedure: 10/2/24  Room/Bed: [address] [place] [address]      DATE OF PROCEDURE: 10/2/2024     PERFORMED BY: MARTIN [email or web address]    CONSENT:  Informed consent was obtained from the patient prior to the procedure. The details of the procedure, as well is its risks, benefits, and alternatives, were explained.       PREOPERATIVE DIAGNOSIS: Removal of HD Tunneled Catheter     POSTOPERATIVE DIAGNOSIS: Same    PROCEDURE: Removal of right internal jugular vein tunneled hemodialysis catheter     ANESTHESIA: 2% Lidocaine without epinephrine administered subcutaneously.     ESTIMATED BLOOD LOSS: Minimal     COMPLICATIONS: None    DESCRIPTION OF PROCEDURE: The patient was identified and the procedure was confirmed. The right neck, chest, and catheter were prepped and draped in the usual sterile fashion.  Next, 2% lidocaine was administered subcutaneously for local anesthesia.   , Through the catheter exit site the cuff was freed from the surrounding tissues., Traction was applied to the catheter and it was removed intact. . Pressure was held for hemostasis and tissue adhesive was placed and a sterile pressure dressing was then applied to the exit site.  The patient tolerated the procedure and left in satisfactory condition.    Thank you for allowing Interventional Radiology to participate in the care of Ace Hanna.     Electronically signed by MARTIN Hardin   DD: 10/2/24  12:42 PM

## 2024-10-02 NOTE — PROGRESS NOTES
[facility]  Internal Medicine Residency / House Medicine Service    Attending Physician Statement  I have discussed the case, including pertinent history and exam findings with the resident and the team.  I have seen and examined the patient and the key elements of the encounter have been performed by me.  I agree with the assessment, plan and orders as documented by the resident.      No furtherGI bleeding noted  VS stable   On PPI and increase amount for now  Intermittent use of Advil but not ASA  Plan: Follow Hb and VS x 24 hours           No endoscopy planned    Remainder of medical problems as per resident note.      Clarke Herr MD FRCP [facility]  Internal Medicine Residency Faculty

## 2024-10-02 NOTE — ACP (ADVANCE CARE PLANNING)
Advance Care Planning   Healthcare Decision Maker:    Primary Decision Maker: Maddi Hanna - [facility] - [phone]    Click here to complete Healthcare Decision Makers including selection of the Healthcare Decision Maker Relationship (ie \"Primary\").               Doris Teixeira RN.

## 2024-10-03 ENCOUNTER — APPOINTMENT (OUTPATIENT)
Age: 79
End: 2024-10-03
Attending: INTERNAL MEDICINE
Payer: MEDICARE

## 2024-10-03 PROBLEM — I47.29 NSVT (NONSUSTAINED VENTRICULAR TACHYCARDIA) (HCC): Status: ACTIVE | Noted: 2024-10-03

## 2024-10-03 LAB
ANION GAP SERPL CALCULATED.3IONS-SCNC: 14 MMOL/L (ref 7–16)
BASOPHILS # BLD: 0.01 K/UL (ref 0–0.2)
BASOPHILS NFR BLD: 0 % (ref 0–2)
BUN SERPL-MCNC: 22 MG/DL (ref 6–23)
CA-I BLD-SCNC: 1.18 MMOL/L (ref 1.15–1.33)
CALCIUM SERPL-MCNC: 7.9 MG/DL (ref 8.6–10.2)
CHLORIDE SERPL-SCNC: 103 MMOL/L (ref 98–107)
CO2 SERPL-SCNC: 21 MMOL/L (ref 22–29)
CREAT SERPL-MCNC: 1.4 MG/DL (ref 0.7–1.2)
ECHO BSA: 1.96 M2
ECHO LV EF PHYSICIAN: 55 %
EOSINOPHIL # BLD: 0.04 K/UL (ref 0.05–0.5)
EOSINOPHILS RELATIVE PERCENT: 2 % (ref 0–6)
ERYTHROCYTE [DISTWIDTH] IN BLOOD BY AUTOMATED COUNT: 17.4 % (ref 11.5–15)
GFR, ESTIMATED: 51 ML/MIN/1.73M2
GLUCOSE SERPL-MCNC: 93 MG/DL (ref 74–99)
HCT VFR BLD AUTO: 21.3 % (ref 37–54)
HCT VFR BLD AUTO: 27 % (ref 37–54)
HGB BLD-MCNC: 6.9 G/DL (ref 12.5–16.5)
HGB BLD-MCNC: 8.9 G/DL (ref 12.5–16.5)
IMM GRANULOCYTES # BLD AUTO: <0.03 K/UL (ref 0–0.58)
IMM GRANULOCYTES NFR BLD: 0 % (ref 0–5)
LYMPHOCYTES NFR BLD: 0.72 K/UL (ref 1.5–4)
LYMPHOCYTES RELATIVE PERCENT: 27 % (ref 20–42)
MAGNESIUM SERPL-MCNC: 1.7 MG/DL (ref 1.6–2.6)
MCH RBC QN AUTO: 31.8 PG (ref 26–35)
MCHC RBC AUTO-ENTMCNC: 32.4 G/DL (ref 32–34.5)
MCV RBC AUTO: 98.2 FL (ref 80–99.9)
MONOCYTES NFR BLD: 0.15 K/UL (ref 0.1–0.95)
MONOCYTES NFR BLD: 6 % (ref 2–12)
NEUTROPHILS NFR BLD: 66 % (ref 43–80)
NEUTS SEG NFR BLD: 1.79 K/UL (ref 1.8–7.3)
PLATELET # BLD AUTO: 136 K/UL (ref 130–450)
PMV BLD AUTO: 10.5 FL (ref 7–12)
POTASSIUM SERPL-SCNC: 3.6 MMOL/L (ref 3.5–5)
RBC # BLD AUTO: 2.17 M/UL (ref 3.8–5.8)
SODIUM SERPL-SCNC: 138 MMOL/L (ref 132–146)
WBC OTHER # BLD: 2.7 K/UL (ref 4.5–11.5)

## 2024-10-03 PROCEDURE — 6370000000 HC RX 637 (ALT 250 FOR IP)

## 2024-10-03 PROCEDURE — 36415 COLL VENOUS BLD VENIPUNCTURE: CPT

## 2024-10-03 PROCEDURE — 82330 ASSAY OF CALCIUM: CPT

## 2024-10-03 PROCEDURE — 2060000000 HC ICU INTERMEDIATE R&B

## 2024-10-03 PROCEDURE — P9016 RBC LEUKOCYTES REDUCED: HCPCS

## 2024-10-03 PROCEDURE — 93308 TTE F-UP OR LMTD: CPT | Performed by: INTERNAL MEDICINE

## 2024-10-03 PROCEDURE — 83735 ASSAY OF MAGNESIUM: CPT

## 2024-10-03 PROCEDURE — 99231 SBSQ HOSP IP/OBS SF/LOW 25: CPT | Performed by: INTERNAL MEDICINE

## 2024-10-03 PROCEDURE — 85018 HEMOGLOBIN: CPT

## 2024-10-03 PROCEDURE — 85025 COMPLETE CBC W/AUTO DIFF WBC: CPT

## 2024-10-03 PROCEDURE — 99223 1ST HOSP IP/OBS HIGH 75: CPT | Performed by: INTERNAL MEDICINE

## 2024-10-03 PROCEDURE — 2580000003 HC RX 258

## 2024-10-03 PROCEDURE — 6360000002 HC RX W HCPCS

## 2024-10-03 PROCEDURE — 36430 TRANSFUSION BLD/BLD COMPNT: CPT

## 2024-10-03 PROCEDURE — 93005 ELECTROCARDIOGRAM TRACING: CPT

## 2024-10-03 PROCEDURE — 85014 HEMATOCRIT: CPT

## 2024-10-03 PROCEDURE — 80048 BASIC METABOLIC PNL TOTAL CA: CPT

## 2024-10-03 PROCEDURE — 93308 TTE F-UP OR LMTD: CPT

## 2024-10-03 RX ORDER — MAGNESIUM SULFATE IN WATER 40 MG/ML
2000 INJECTION, SOLUTION INTRAVENOUS ONCE
Status: COMPLETED | OUTPATIENT
Start: 2024-10-03 | End: 2024-10-03

## 2024-10-03 RX ORDER — SODIUM CHLORIDE 9 MG/ML
INJECTION, SOLUTION INTRAVENOUS PRN
Status: DISCONTINUED | OUTPATIENT
Start: 2024-10-03 | End: 2024-10-04 | Stop reason: HOSPADM

## 2024-10-03 RX ADMIN — MAGNESIUM SULFATE HEPTAHYDRATE 2000 MG: 40 INJECTION, SOLUTION INTRAVENOUS at 13:09

## 2024-10-03 RX ADMIN — PANTOPRAZOLE SODIUM 40 MG: 40 TABLET, DELAYED RELEASE ORAL at 15:37

## 2024-10-03 RX ADMIN — POTASSIUM BICARBONATE 20 MEQ: 782 TABLET, EFFERVESCENT ORAL at 07:58

## 2024-10-03 RX ADMIN — SODIUM CHLORIDE, PRESERVATIVE FREE 10 ML: 5 INJECTION INTRAVENOUS at 07:58

## 2024-10-03 RX ADMIN — PANTOPRAZOLE SODIUM 40 MG: 40 TABLET, DELAYED RELEASE ORAL at 05:12

## 2024-10-03 RX ADMIN — SODIUM CHLORIDE, PRESERVATIVE FREE 10 ML: 5 INJECTION INTRAVENOUS at 19:59

## 2024-10-03 ASSESSMENT — PAIN SCALES - GENERAL
PAINLEVEL_OUTOF10: 0

## 2024-10-03 NOTE — CONSULTS
[facility] PHYSICIANS- [facility] Electrophysiology  Consultation Report  PATIENT: Ace Hanna  MEDICAL RECORD NUMBER: [id number]  DATE OF SERVICE:  10/3/2024  ATTENDING ELECTROPHYSIOLOGIST: Laura Duron MD  PRIMARY ELECTROPHYSIOLOGIST: Laura Duron MD  REFERRING PHYSICIAN: No ref. provider found and Swetha Pope MD  CHIEF COMPLAINT: Abnormal blood test result    HPI: This is a 78 y.o. male with a history of   Patient Active Problem List   Diagnosis    Hemorrhoid thrombosis    Carcinoma of prostate (HCC)    Pernicious anemia    Hyperlipidemia    Gastroesophageal reflux disease    Acute sinusitis    Hydronephrosis due to obstruction of ureter    Hydronephrosis of left kidney    Cancer (HCC)    BPH (benign prostatic hyperplasia)    Mass of left inguinal region    Malignant neuroendocrine neoplasm (HCC)    S/P ureteral stent placement    Chest pain    Acute chest pain    Thrombocytopenia, unspecified (HCC)    Acute on chronic left systolic heart failure (HCC)    Pericardial effusion    Bilateral pleural effusion    Chronic heart failure with preserved ejection fraction (HFpEF) (HCC)    Primary hypertension    Acute kidney injury (HCC)    Anemia    Moderate obesity    Volume overload    Chronic right flank pain    Admission for dialysis (HCC)    Acute unilateral obstructive uropathy    Palliative care encounter    GI bleed    Symptomatic anemia   who presented to the hospital after he was found to be anemic on blood test result. 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 The patient presented to [facility] in August 2024 due to chest pain and shortness of breath. CT of the chest

## 2024-10-03 NOTE — PROGRESS NOTES
[facility]  Internal Medicine Residency Program  Progress Note - House Team       Patient:  Ace Hanna 78 y.o. male   MRN: [id number]       Date of Service: 10/3/2024    CC: Bloody stools    Overnight events: Hb dropped to 6.9 from 7.1, ordered 1 unit pRBC. Has received total of 2 units since admission. Had 13 beats of V Tach overnight.     Subjective     Patient was seen and evaluated at bedside. He has tolerated oral intake, has been able to ambulate. No bloody bowel movements since the day of his admission. Denies shortness of breath, chest pain, dizziness.     Objective     Physical Exam  Vitals: BP 95/60   Pulse 85   Temp 96.8 °F (36 °C) (Temporal)   Resp 16   Ht 1.702 m (5' 7\")   Wt 81.6 kg (180 lb)   SpO2 98%   BMI 28.19 kg/m²     I & O - 24hr: No intake/output data recorded.   General Appearance: alert, appears stated age, and cooperative  HEENT:  Head: Normal, normocephalic, atraumatic.  Neck: no adenopathy, no carotid bruit, and no JVD  Lung: clear to auscultation bilaterally  Heart: regular rate and rhythm, S1, S2 normal, no murmur, click, rub or gallop  Abdomen: soft, non-tender; bowel sounds normal; no masses,  no organomegaly  Extremities:  extremities normal, atraumatic, no cyanosis or edema  Musculokeletal: No joint swelling, no muscle tenderness. ROM normal in all joints of extremities.   Neurologic: Mental status: Alert, oriented, thought content appropriate    Diet:   ADULT DIET; Regular; 4 carb choices (60 gm/meal)     sodium chloride flush  5-40 mL IntraVENous 2 times per day    enzalutamide  80 mg Oral BID    [Held by provider] metoprolol succinate  25 mg Oral Daily    pantoprazole  40 mg Oral BID AC       Pertinent Labs & Imaging Studies     Labs    Complete Blood Count:   Recent Labs     10/01/24  1225 10/01/24  1651 10/02/24  0459 10/03/24  0526   WBC 2.9*  --  2.9* 2.7*   HGB 6.4* 7.3* 7.1* 6.9*   HCT 20.2* 22.2* 21.5* 21.3*     --  141 136

## 2024-10-03 NOTE — PROGRESS NOTES
NARCISO Valles notified of [facility] Cardio consult.  Pt added to census

## 2024-10-03 NOTE — PROGRESS NOTES
[facility]  Internal Medicine Residency / House Medicine Service    Attending Physician Statement  I have discussed the case, including pertinent history and exam findings with the resident and the team.  I have seen and examined the patient and the key elements of the encounter have been performed by me.  I agree with the assessment, plan and orders as documented by the resident.      A&O, mobile  Receiving transfusion this AM for Hb<7  Had 13 beats of V Tachy overnight  ECHO EF 55%  Replacing KCl  Will consult EP prior to discharge at patient's request  Plan: Discharge planning  Remainder of medical problems as per resident note.      Clarke Herr MD FRCP [facility]  Internal Medicine Residency Faculty

## 2024-10-03 NOTE — PROGRESS NOTES
Department of Internal Medicine  Nephrology Progress Note      Events reviewed.    SUBJECTIVE: We are following Mr. Hanna for THU. Patient reports no new complaints today.    PHYSICAL EXAM:      Vitals:    VITALS:  /70   Pulse 91   Temp 97.8 °F (36.6 °C) (Temporal)   Resp 18   Ht 1.702 m (5' 7\")   Wt 81.6 kg (180 lb)   SpO2 97%   BMI 28.19 kg/m²   24HR INTAKE/OUTPUT:    Intake/Output Summary (Last 24 hours) at 10/3/2024 1350  Last data filed at 10/3/2024 1120  Gross per 24 hour   Intake 590 ml   Output --   Net 590 ml         Constitutional: Patient awake, alert in no distress, seen on dialysis  HEENT: Pupils are equal reactive, mucous membranes moist  Respiratory: Lungs are clear  Cardiovascular/Edema: Heart sounds are regular  Gastrointestinal: Abdomen soft  Neurologic: No focal  Skin: No lesions  Other: No edema    Scheduled Meds:   magnesium sulfate  2,000 mg IntraVENous Once    sodium chloride flush  5-40 mL IntraVENous 2 times per day    enzalutamide  80 mg Oral BID    [Held by provider] metoprolol succinate  25 mg Oral Daily    pantoprazole  40 mg Oral BID AC     Continuous Infusions:   sodium chloride      sodium chloride      sodium chloride       PRN Meds:.sodium chloride, sodium chloride, sodium chloride flush, sodium chloride, ondansetron **OR** ondansetron, polyethylene glycol, acetaminophen **OR** acetaminophen, sennosides-docusate sodium      DATA:    CBC:   Lab Results   Component Value Date/Time    WBC 2.7 10/03/2024 05:26 AM    RBC 2.17 10/03/2024 05:26 AM    HGB 8.9 10/03/2024 01:03 PM    HCT 27.0 10/03/2024 01:03 PM    MCV 98.2 10/03/2024 05:26 AM    MCH 31.8 10/03/2024 05:26 AM    MCHC 32.4 10/03/2024 05:26 AM    RDW 17.4 10/03/2024 05:26 AM     10/03/2024 05:26 AM    MPV 10.5 10/03/2024 05:26 AM     CMP:    Lab Results   Component Value Date/Time     10/03/2024 05:26 AM    K 3.6 10/03/2024 05:26 AM    K 4.4 02/25/2020 08:34 AM     10/03/2024 05:26 AM    CO2 21

## 2024-10-03 NOTE — CARE COORDINATION
Here wih GI bleed. Hgb today 6.9  for 1 unit prbcs  wbc  2.7  bun 22 creat 1.4 Per nursing up and about  in room Had 13 beats of V Tach overnight Cardiology consult. Nephrology following Discharge plan home with [facility] orders are in.cm/sw to follow.Electronically signed by Ely Cervantes RN on 10/3/2024 at 2:02 PM

## 2024-10-04 VITALS
HEART RATE: 78 BPM | WEIGHT: 180 LBS | BODY MASS INDEX: 28.25 KG/M2 | DIASTOLIC BLOOD PRESSURE: 65 MMHG | SYSTOLIC BLOOD PRESSURE: 94 MMHG | HEIGHT: 67 IN | TEMPERATURE: 97.5 F | OXYGEN SATURATION: 100 % | RESPIRATION RATE: 18 BRPM

## 2024-10-04 LAB
ABO/RH: NORMAL
ANION GAP SERPL CALCULATED.3IONS-SCNC: 12 MMOL/L (ref 7–16)
ANTIBODY SCREEN: NEGATIVE
ARM BAND NUMBER: NORMAL
BASOPHILS # BLD: 0.02 K/UL (ref 0–0.2)
BASOPHILS NFR BLD: 1 % (ref 0–2)
BLOOD BANK BLOOD PRODUCT EXPIRATION DATE: NORMAL
BLOOD BANK BLOOD PRODUCT EXPIRATION DATE: NORMAL
BLOOD BANK DISPENSE STATUS: NORMAL
BLOOD BANK DISPENSE STATUS: NORMAL
BLOOD BANK ISBT PRODUCT BLOOD TYPE: 7300
BLOOD BANK ISBT PRODUCT BLOOD TYPE: 7300
BLOOD BANK PRODUCT CODE: NORMAL
BLOOD BANK PRODUCT CODE: NORMAL
BLOOD BANK SAMPLE EXPIRATION: NORMAL
BLOOD BANK UNIT TYPE AND RH: NORMAL
BLOOD BANK UNIT TYPE AND RH: NORMAL
BPU ID: NORMAL
BPU ID: NORMAL
BUN SERPL-MCNC: 20 MG/DL (ref 6–23)
CALCIUM SERPL-MCNC: 8.6 MG/DL (ref 8.6–10.2)
CHLORIDE SERPL-SCNC: 105 MMOL/L (ref 98–107)
CO2 SERPL-SCNC: 19 MMOL/L (ref 22–29)
COMPONENT: NORMAL
COMPONENT: NORMAL
CREAT SERPL-MCNC: 1.3 MG/DL (ref 0.7–1.2)
CROSSMATCH RESULT: NORMAL
CROSSMATCH RESULT: NORMAL
EKG ATRIAL RATE: 80 BPM
EKG P AXIS: 39 DEGREES
EKG P-R INTERVAL: 192 MS
EKG Q-T INTERVAL: 422 MS
EKG QRS DURATION: 108 MS
EKG QTC CALCULATION (BAZETT): 486 MS
EKG R AXIS: -51 DEGREES
EKG T AXIS: 90 DEGREES
EKG VENTRICULAR RATE: 80 BPM
EOSINOPHIL # BLD: 0.03 K/UL (ref 0.05–0.5)
EOSINOPHILS RELATIVE PERCENT: 1 % (ref 0–6)
ERYTHROCYTE [DISTWIDTH] IN BLOOD BY AUTOMATED COUNT: 18.2 % (ref 11.5–15)
GFR, ESTIMATED: 57 ML/MIN/1.73M2
GLUCOSE SERPL-MCNC: 93 MG/DL (ref 74–99)
HCT VFR BLD AUTO: 27.3 % (ref 37–54)
HGB BLD-MCNC: 8.8 G/DL (ref 12.5–16.5)
IMM GRANULOCYTES # BLD AUTO: <0.03 K/UL (ref 0–0.58)
IMM GRANULOCYTES NFR BLD: 1 % (ref 0–5)
LYMPHOCYTES NFR BLD: 0.62 K/UL (ref 1.5–4)
LYMPHOCYTES RELATIVE PERCENT: 22 % (ref 20–42)
MCH RBC QN AUTO: 31.2 PG (ref 26–35)
MCHC RBC AUTO-ENTMCNC: 32.2 G/DL (ref 32–34.5)
MCV RBC AUTO: 96.8 FL (ref 80–99.9)
MONOCYTES NFR BLD: 0.2 K/UL (ref 0.1–0.95)
MONOCYTES NFR BLD: 7 % (ref 2–12)
NEUTROPHILS NFR BLD: 69 % (ref 43–80)
NEUTS SEG NFR BLD: 1.97 K/UL (ref 1.8–7.3)
PLATELET # BLD AUTO: 132 K/UL (ref 130–450)
PMV BLD AUTO: 10.2 FL (ref 7–12)
POTASSIUM SERPL-SCNC: 3.7 MMOL/L (ref 3.5–5)
RBC # BLD AUTO: 2.82 M/UL (ref 3.8–5.8)
SODIUM SERPL-SCNC: 136 MMOL/L (ref 132–146)
TRANSFUSION STATUS: NORMAL
TRANSFUSION STATUS: NORMAL
UNIT DIVISION: 0
UNIT DIVISION: 0
UNIT ISSUE DATE/TIME: NORMAL
UNIT ISSUE DATE/TIME: NORMAL
WBC OTHER # BLD: 2.9 K/UL (ref 4.5–11.5)

## 2024-10-04 PROCEDURE — 6370000000 HC RX 637 (ALT 250 FOR IP)

## 2024-10-04 PROCEDURE — 36415 COLL VENOUS BLD VENIPUNCTURE: CPT

## 2024-10-04 PROCEDURE — 85025 COMPLETE CBC W/AUTO DIFF WBC: CPT

## 2024-10-04 PROCEDURE — 80048 BASIC METABOLIC PNL TOTAL CA: CPT

## 2024-10-04 PROCEDURE — 6360000002 HC RX W HCPCS

## 2024-10-04 PROCEDURE — 99238 HOSP IP/OBS DSCHRG MGMT 30/<: CPT | Performed by: INTERNAL MEDICINE

## 2024-10-04 PROCEDURE — 2580000003 HC RX 258

## 2024-10-04 RX ORDER — PANTOPRAZOLE SODIUM 40 MG/1
40 TABLET, DELAYED RELEASE ORAL 2 TIMES DAILY
Qty: 54 TABLET | Refills: 0 | Status: CANCELLED | OUTPATIENT
Start: 2024-10-04 | End: 2024-10-31

## 2024-10-04 RX ORDER — PANTOPRAZOLE SODIUM 40 MG/1
40 TABLET, DELAYED RELEASE ORAL DAILY
Qty: 180 TABLET | Refills: 1 | Status: SHIPPED | OUTPATIENT
Start: 2024-11-01

## 2024-10-04 RX ORDER — METOPROLOL SUCCINATE 25 MG/1
25 TABLET, EXTENDED RELEASE ORAL DAILY
Qty: 30 TABLET | Refills: 3 | Status: SHIPPED | OUTPATIENT
Start: 2024-10-04

## 2024-10-04 RX ORDER — POTASSIUM CHLORIDE 1500 MG/1
40 TABLET, EXTENDED RELEASE ORAL ONCE
Status: COMPLETED | OUTPATIENT
Start: 2024-10-04 | End: 2024-10-04

## 2024-10-04 RX ORDER — PANTOPRAZOLE SODIUM 40 MG/1
40 TABLET, DELAYED RELEASE ORAL 2 TIMES DAILY
Qty: 52 TABLET | Refills: 0 | Status: CANCELLED | OUTPATIENT
Start: 2024-10-04 | End: 2024-10-30

## 2024-10-04 RX ORDER — PANTOPRAZOLE SODIUM 40 MG/1
TABLET, DELAYED RELEASE ORAL
Qty: 90 TABLET | Refills: 1 | Status: SHIPPED | OUTPATIENT
Start: 2024-10-04 | End: 2025-01-30

## 2024-10-04 RX ORDER — MAGNESIUM SULFATE IN WATER 40 MG/ML
2000 INJECTION, SOLUTION INTRAVENOUS ONCE
Status: COMPLETED | OUTPATIENT
Start: 2024-10-04 | End: 2024-10-04

## 2024-10-04 RX ADMIN — PANTOPRAZOLE SODIUM 40 MG: 40 TABLET, DELAYED RELEASE ORAL at 05:29

## 2024-10-04 RX ADMIN — SODIUM CHLORIDE, PRESERVATIVE FREE 10 ML: 5 INJECTION INTRAVENOUS at 08:48

## 2024-10-04 RX ADMIN — MAGNESIUM SULFATE HEPTAHYDRATE 2000 MG: 40 INJECTION, SOLUTION INTRAVENOUS at 09:33

## 2024-10-04 RX ADMIN — POTASSIUM CHLORIDE 40 MEQ: 1500 TABLET, EXTENDED RELEASE ORAL at 09:28

## 2024-10-04 ASSESSMENT — ENCOUNTER SYMPTOMS
CHOKING: 0
COUGH: 0
BACK PAIN: 0
BLOOD IN STOOL: 0
SHORTNESS OF BREATH: 0
WHEEZING: 0
SORE THROAT: 0
VOMITING: 0
CONSTIPATION: 0
ABDOMINAL PAIN: 0
ANAL BLEEDING: 0
NAUSEA: 0
DIARRHEA: 0

## 2024-10-04 ASSESSMENT — PAIN SCALES - GENERAL
PAINLEVEL_OUTOF10: 0

## 2024-10-04 NOTE — PROGRESS NOTES
Department of Internal Medicine  Nephrology Progress Note      Events reviewed.    SUBJECTIVE: We are following Mr. Hanna for THU. Patient reports no new complaints today.    PHYSICAL EXAM:      Vitals:    VITALS:  BP 94/65   Pulse 78   Temp 97.5 °F (36.4 °C) (Axillary)   Resp 18   Ht 1.702 m (5' 7\")   Wt 81.6 kg (180 lb)   SpO2 100%   BMI 28.19 kg/m²   24HR INTAKE/OUTPUT:  No intake or output data in the 24 hours ending 10/04/24 1205        Constitutional: Patient awake, alert in no distress, seen on dialysis  HEENT: Pupils are equal reactive, mucous membranes moist  Respiratory: Lungs are clear  Cardiovascular/Edema: Heart sounds are regular  Gastrointestinal: Abdomen soft  Neurologic: No focal  Skin: No lesions  Other: No edema    Scheduled Meds:   sodium chloride flush  5-40 mL IntraVENous 2 times per day    enzalutamide  80 mg Oral BID    [Held by provider] metoprolol succinate  25 mg Oral Daily    pantoprazole  40 mg Oral BID AC     Continuous Infusions:   sodium chloride      sodium chloride      sodium chloride       PRN Meds:.sodium chloride, sodium chloride, sodium chloride flush, sodium chloride, ondansetron **OR** ondansetron, polyethylene glycol, acetaminophen **OR** acetaminophen, sennosides-docusate sodium      DATA:    CBC:   Lab Results   Component Value Date/Time    WBC 2.9 10/04/2024 05:56 AM    RBC 2.82 10/04/2024 05:56 AM    HGB 8.8 10/04/2024 05:56 AM    HCT 27.3 10/04/2024 05:56 AM    MCV 96.8 10/04/2024 05:56 AM    MCH 31.2 10/04/2024 05:56 AM    MCHC 32.2 10/04/2024 05:56 AM    RDW 18.2 10/04/2024 05:56 AM     10/04/2024 05:56 AM    MPV 10.2 10/04/2024 05:56 AM     CMP:    Lab Results   Component Value Date/Time     10/04/2024 05:56 AM    K 3.7 10/04/2024 05:56 AM    K 4.4 02/25/2020 08:34 AM     10/04/2024 05:56 AM    CO2 19 10/04/2024 05:56 AM    BUN 20 10/04/2024 05:56 AM    CREATININE 1.3 10/04/2024 05:56 AM    CREATININE 1.2 12/30/2019 12:00 AM    GFRAA >60

## 2024-10-04 NOTE — PLAN OF CARE
Problem: Discharge Planning  Goal: Discharge to home or other facility with appropriate resources  10/4/2024 1028 by Patti Garsia RN  Outcome: Progressing  10/4/2024 0002 by Jennifer Alvarado RN  Outcome: Progressing     Problem: Safety - Adult  Goal: Free from fall injury  10/4/2024 1028 by Patti Garsia RN  Outcome: Progressing  Flowsheets (Taken 10/4/2024 0844)  Free From Fall Injury: Instruct family/caregiver on patient safety  10/4/2024 0002 by Jennifer Alvarado RN  Outcome: Progressing     Problem: ABCDS Injury Assessment  Goal: Absence of physical injury  10/4/2024 1028 by Patti Garsia RN  Outcome: Progressing  Flowsheets (Taken 10/4/2024 0844)  Absence of Physical Injury: Implement safety measures based on patient assessment  10/4/2024 0002 by Jennifer Alvarado RN  Outcome: Progressing

## 2024-10-04 NOTE — PROGRESS NOTES
[facility]  Internal Medicine Residency Program  Progress Note - House Team 1    Patient:  Ace Hanna 78 y.o. male MRN: [id number]     Date of Service: 10/4/2024     CC: bloody stools    Days since admission: 3    Subjective     Overnight events: No acute overnight events    He was seen and examined at bedside this morning in no acute distress.  He had no acute complaints at this time.  He mentioned that he did not have a bowel movement just yet.  No episodes of bleeding noted.  He denied fever, cough, colds, chills, shortness of breath, abdominal pain, diarrhea, or constipation.  He is able to ambulate without having dyspnea.    Objective     Physical Exam:  Vitals: BP 91/66   Pulse 81   Temp 97.7 °F (36.5 °C) (Oral)   Resp 18   Ht 1.702 m (5' 7\")   Wt 81.6 kg (180 lb)   SpO2 94%   BMI 28.19 kg/m²     I & O - 24hr:   Intake/Output Summary (Last 24 hours) at 10/4/2024 0809  Last data filed at 10/3/2024 1120  Gross per 24 hour   Intake 350 ml   Output --   Net 350 ml      General Appearance: alert, appears stated age, and cooperative  HEENT:  Head: Normocephalic, no lesions, without obvious abnormality.  Neck: no adenopathy, no carotid bruit, no JVD, supple, symmetrical, trachea midline, and thyroid not enlarged, symmetric, no tenderness/mass/nodules  Lung: clear to auscultation bilaterally  Heart: regular rate and rhythm, S1, S2 normal, no murmur, click, rub or gallop  Abdomen: soft, non-tender; bowel sounds normal; no masses,  no organomegaly  Extremities:  edema bipedal grade 2+  Musculokeletal: No joint swelling, no muscle tenderness. ROM normal in all joints of extremities.   Neurologic: Mental status: Alert, oriented, thought content appropriate    Pertinent Information & Imaging Studies, Consults     CBC:   Lab Results   Component Value Date/Time    WBC 2.9 10/04/2024 05:56 AM    RBC 2.82 10/04/2024 05:56 AM    HGB 8.8 10/04/2024 05:56 AM    HCT 27.3 10/04/2024 05:56 AM    MCV

## 2024-10-04 NOTE — PLAN OF CARE
Problem: Discharge Planning  Goal: Discharge to home or other facility with appropriate resources  10/4/2024 1352 by Patti Garsia RN  Outcome: Completed  10/4/2024 1028 by Patti Garsia RN  Outcome: Progressing  10/4/2024 0002 by Jennifer Alvarado RN  Outcome: Progressing     Problem: Safety - Adult  Goal: Free from fall injury  10/4/2024 1352 by Patti Garsia RN  Outcome: Completed  10/4/2024 1028 by Patti Garsia RN  Outcome: Progressing  Flowsheets (Taken 10/4/2024 0844)  Free From Fall Injury: Instruct family/caregiver on patient safety  10/4/2024 0002 by Jennifer Alvarado RN  Outcome: Progressing     Problem: ABCDS Injury Assessment  Goal: Absence of physical injury  10/4/2024 1352 by Patti Garsia RN  Outcome: Completed  10/4/2024 1028 by Patti Garsia RN  Outcome: Progressing  Flowsheets (Taken 10/4/2024 0844)  Absence of Physical Injury: Implement safety measures based on patient assessment  10/4/2024 0002 by Jennifer Alvarado RN  Outcome: Progressing

## 2024-10-04 NOTE — DISCHARGE SUMMARY
[facility]  Discharge Summary    PCP: Swetha Pope MD    Admit Date:10/1/2024  Discharge Date: 10/4/2024    Chief Complaint   Patient presents with    Abnormal lab     Low hemoglobin. 6.2 Missed dialysis today.          Admission Diagnosis:   Acute on chronic macrocytic anemia in the setting of gastrointestinal bleeding   Acute Kidney Injury on Chronic Kidney Disease, intrinsic likely ischemic acute tubular necrosis, on hemodialysis   Concern on leukopenia   Acute unilateral obstructive uropathy s/p cystoscopy retrograde pyelogram bilateral stent insertion (09/17/2024)   Heart failure with preserved ejection fraction   Pericardial Effusion likely 2/2 metastatic prostate adenocarcinoma s/p subxiphoid pericardial window creation (08/29/2024), stable   Metastatic Prostate Adenocarcinoma s/p brachytherapy, hx of metastatic poorly differentiated neuroendocrine carcinoma   Hyperlipidemia   Hx of Gastroesophageal Reflux Disease     Discharge Diagnosis:  Acute on chronic macrocytic anemia in the setting of gastrointestinal bleeding, resolved  Concern on ventricular tachycardia, resolved  Acute Kidney Injury on Chronic Kidney Disease, intrinsic likely ischemic acute tubular necrosis, s/p hemodialysis  Concern on leukopenia likely secondary to chemotherapeutic drug (Xtandi), rule out infection   Acute unilateral obstructive uropathy s/p cystoscopy retrograde pyelogram bilateral stent insertion (09/17/2024)   Heart failure with preserved ejection fraction   Pericardial Effusion likely 2/2 metastatic prostate adenocarcinoma s/p subxiphoid pericardial window creation (08/29/2024), stable   Metastatic Prostate Adenocarcinoma s/p brachytherapy, hx of metastatic poorly differentiated neuroendocrine carcinoma  Hyperlipidemia    Hx of Gastroesophageal Reflux Disease     Hospital Course:     Ace Hanna is a 78 y.o. male with a past medical history of heart failure with  based on information provided to us from you and your healthcare providers. This information, including the list, dose, and frequency of medications is only as accurate as the information you provided. If you have any questions or concerns about your home medications, please contact your Primary Care Physician for further clarification.             Kyle Kinney MD  PGY 1   1:22 PM 10/4/2024

## 2024-10-04 NOTE — PROGRESS NOTES
[facility] PHYSICIANS- [facility]  Inpatient progress note  PATIENT: Ace Hanna  MEDICAL RECORD NUMBER: [id number]  DATE OF SERVICE:  10/4/2024  ATTENDING ELECTROPHYSIOLOGIST:Laura Duron MD  PRIMARY ELECTROPHYSIOLOGIST: Laura Duron MD  REFERRING PHYSICIAN: No ref. provider found and Swetha Pope MD  CHIEF COMPLAINT: Abnormal blood test result    HPI: This is a 78 y.o. male with a history of   Patient Active Problem List   Diagnosis    Hemorrhoid thrombosis    Carcinoma of prostate (HCC)    Pernicious anemia    Hyperlipidemia    Gastroesophageal reflux disease    Acute sinusitis    Hydronephrosis due to obstruction of ureter    Hydronephrosis of left kidney    Cancer (HCC)    BPH (benign prostatic hyperplasia)    Mass of left inguinal region    Malignant neuroendocrine neoplasm (HCC)    S/P ureteral stent placement    Chest pain    Acute chest pain    Thrombocytopenia, unspecified (HCC)    Acute on chronic left systolic heart failure (HCC)    Pericardial effusion    Bilateral pleural effusion    Chronic heart failure with preserved ejection fraction (HFpEF) (HCC)    Primary hypertension    Acute kidney injury (HCC)    Anemia    Moderate obesity    Volume overload    Chronic right flank pain    Admission for dialysis (HCC)    Acute unilateral obstructive uropathy    Palliative care encounter    GI bleed    Symptomatic anemia    NSVT (nonsustained ventricular tachycardia) (HCC)   who presented to the hospital after he was found to be anemic on blood test result. 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 The patient presented to [facility] in August 2024 due to  suggesting early hemodynamic compromise.    - Seen by CTS and underwent subxiphoid pericardial window on 8/29/24.   - Follow up TTE 9/3/24 showed small pericardial effusion and 9/17/24 showed moderate pericardial effusion.   -Follow up limited TTE 10/3/24 showed moderate pericardial effusion    3. Chronic HFrecEF  - TTE 5/3/23: LV EF 60%.  - TTE 8/28/24: LV EF 45-50%.  - TTE 9/3/24: LV EF 50-55%.  - TTE 9/17/24: LV EF 55%.  - On GDMT including Toprol XL    4. Hypertension  - Currently hypotensive.  - Toprol XL on hold.    5. Hyperlipidemia    5. Anemia    6. CKD  -  On 8/30/24, an RRT was called due to hypotension and found to have THU afterward.   - Seen by Nephrology and underwent hemodialysis.   - Admitted in September 2024 due to right flank pain. CT of abdomen showed moderate distention of right renal collecting system & ureter and stranding surrounding right kidney. Also reiterated abnormal appearance of left kidney & notes significant interval worsening of soft tissue mass near left kidney. Underwent cystoscopy retrograde pyelogram bilateral stent insertion on 9/17/24.    - HD dialysis catheter was removed  10/2/24 due to significant recovery of renal function    7. BPH    8. GERD     9. Gómez's esophagus    10. Left nephrolithiasis with hydronephrosis S/P left ureteral stent placement 3/16/23    11. Metastatic poorly differentiated neuroendocrine carcinoma diagnosed in December 2021    12. Prostate cancer with bone metastasis    Recommendations:  Limited echocardiogram showing LVEF 55% with a moderate pericardial  Consider nuclear stress test as an outpatient as he is currently not a good candidate due to severe anemia.  Treat anemia.  Keep potassium > 4 and magnesium > 2.  Consider resume Metoprolol if BP allows.  EP to sign off at this time    I have spent a total of 10 minutes with the patient and the family reviewing the above stated recommendations.  And a total of >50% of that time involved

## 2024-10-04 NOTE — PROGRESS NOTES
[facility]  Internal Medicine Residency / House Medicine Service    Attending Physician Statement  I have discussed the case, including pertinent history and exam findings with the resident and the team.  I have seen and examined the patient and the key elements of the encounter have been performed by me.  I agree with the assessment, plan and orders as documented by the resident.      Ready for discharge  EP Recommendations appreciated   And explaine to patient    Remainder of medical problems as per resident note.      Clarke Herr MD  Internal Medicine Residency Faculty

## 2024-10-04 NOTE — PROGRESS NOTES
Reviewed discharge instructions with patient at this time.    Patient verbalized understanding of same.   Patient given home medication in which was provided to hospital.

## 2024-10-04 NOTE — CARE COORDINATION
Here wih GI bleed. Hgb today 8.8 Nephrology following-Discontinued HD  Okay to discharge from renal point of view  Discharge plan home with [facility] - abebe orders are in.cm/sw to follow. Briseida with [facility] ntfd of potential discharge today. Electronically signed by Ely Cervantes RN on 10/4/2024 at 12:13 PM

## 2024-10-07 ENCOUNTER — CARE COORDINATION (OUTPATIENT)
Dept: CARE COORDINATION | Age: 79
End: 2024-10-07

## 2024-10-07 NOTE — CARE COORDINATION
Care Transitions Note    Initial Call - Call within 2 business days of discharge: Yes    Patient Current Location:  Home: [address] [place] [address] [place] [address]    Care Transition Nurse contacted the spouse/partner wife Ely , (PHI form verified; on file) by telephone to perform post hospital discharge assessment, verified name and  as identifiers. Provided introduction to self, and explanation of the Care Transition Nurse role.     Patient: Ace Hanna    Patient : 1945   MRN: [id number]    Reason for Admission: symptomatic anemia  Discharge Date: 10/4/24  RURS: Readmission Risk Score: 21.9      Last Discharge Facility       Date Complaint Diagnosis Description Type Department Provider    10/1/24 Abnormal lab Symptomatic anemia ... ED to Hosp-Admission (Discharged) (ADMITTED) [facility] Clarke Herr MD; Daniel-Spir...            Was this an external facility discharge? No    Additional needs identified to be addressed with provider   No needs identified             Method of communication with provider: none.    Patients top risk factors for readmission: caregiver stress, medical condition-anemia, immunocompromised, CHF, HTN, multiple health system providers, and utilization of services    Interventions to address risk factors:   Review of patient management of conditions/medications.  Home Health: [facility] with [facility] this afternoon per patient's wife.   Has [facility] (Dr López) follow up this afternoon per patient's wife.  Has [facility] (Dr Tillman) follow up tomorrow and labs will be drawn per patient's wife.  CTN discussed with patient's wife per last nephrology note (Dr Lambert):  \"Follow-up with me in 3 to 4 weeks.  CMP in 2 weeks.\"  CTN provided patient's wife with nephrology phone number per request.    Care Summary Note:   -Patient is a readmission.  [place] [facility] [place] with symptoms of bloody BM.  Hgb 6.2, received blood, hospital discharge Hgb 8.8.  See

## 2024-11-24 PROBLEM — I26.99 PE (PULMONARY THROMBOEMBOLISM) (HCC): Status: ACTIVE | Noted: 2024-01-01

## 2024-11-24 NOTE — ED PROVIDER NOTES
Chief complaint:  Abdominal pain      HPI history provided by the patient  The patient presents here complaining of general abdominal pain starting a day or so ago, started across the diffuse lower abdomen and now the upper and lower abdomen hurt is putting pressure up in the lower chest he states.  No vomiting or diarrhea.  No other chest pain.  No shortness of breath or palpitations.  No fevers, flank pain or dysuria.  Has a history of ureteral stents in place but states he was urinating fine.    Review of Systems   Constitutional:  Negative for chills, diaphoresis, fatigue and fever.   HENT:  Negative for congestion and sore throat.    Respiratory:  Negative for cough, chest tightness, shortness of breath and wheezing.    Cardiovascular:  Positive for chest pain. Negative for palpitations and leg swelling.   Gastrointestinal:  Positive for abdominal pain. Negative for diarrhea, nausea and vomiting.   Genitourinary:  Negative for dysuria, flank pain, frequency, hematuria and urgency.   Musculoskeletal:  Negative for arthralgias, back pain, gait problem, joint swelling, myalgias, neck pain and neck stiffness.   Skin:  Negative for rash and wound.   Neurological:  Negative for dizziness, seizures, syncope, weakness, light-headedness, numbness and headaches.   All other systems reviewed and are negative.       Physical Exam  Vitals and nursing note reviewed.   Constitutional:       General: He is awake. He is not in acute distress.     Appearance: He is well-developed. He is not ill-appearing, toxic-appearing or diaphoretic.   HENT:      Head: Normocephalic and atraumatic.   Eyes:      General: No scleral icterus.     Pupils: Pupils are equal, round, and reactive to light.   Cardiovascular:      Rate and Rhythm: Normal rate and regular rhythm.      Heart sounds: Normal heart sounds. No murmur heard.  Pulmonary:      Effort: Pulmonary effort is normal. No respiratory distress.      Breath sounds: Normal breath sounds.          Disposition  Patient's disposition: Admit to telemetry  Patient's condition is stable.       Shant Grant,   11/24/24 [id number]

## 2024-11-25 PROBLEM — I50.23 ACUTE ON CHRONIC LEFT SYSTOLIC HEART FAILURE (HCC): Status: RESOLVED | Noted: 2024-08-27 | Resolved: 2024-11-25

## 2024-11-25 PROBLEM — E43 SEVERE PROTEIN-CALORIE MALNUTRITION (HCC): Status: ACTIVE | Noted: 2024-01-01

## 2024-11-25 PROBLEM — N17.9 ACUTE KIDNEY INJURY (HCC): Status: RESOLVED | Noted: 2024-08-28 | Resolved: 2024-11-25

## 2024-11-25 PROBLEM — Z99.2 ADMISSION FOR DIALYSIS (HCC): Status: RESOLVED | Noted: 2024-09-14 | Resolved: 2024-11-25

## 2024-11-25 NOTE — PLAN OF CARE
Problem: Pain  Goal: Verbalizes/displays adequate comfort level or baseline comfort level  11/25/2024 1100 by Rene Garcia, RN  Outcome: Progressing     Problem: Safety - Adult  Goal: Free from fall injury  11/25/2024 1100 by Rene Garcia, RN  Outcome: Progressing     Problem: Hematologic - Adult  Goal: Maintains hematologic stability  Outcome: Progressing

## 2024-11-25 NOTE — PROGRESS NOTES
Physical Therapy Initial Evaluation/Plan of Care    Room #:  0438/0438-01  Patient Name: Ace Hanna  YOB: 1945  MRN: [id number]    Date of Service: 11/25/2024     Tentative placement recommendation: Home with Home Health Physical Therapy  Equipment recommendation: None  at this time    Evaluating Physical Therapist: Carmen oBss, PT #[id number]      Specific Provider Orders/Date/Referring Provider :  11/24/24 2315    PT evaluation and treat  Start:  11/24/24 2315,   End:  11/24/24 2315,   ONE TIME,   Standing Count:  1 Occurrences,   R       Anne Mckeon DO    Admitting Diagnosis:   Colitis [K52.9]  Pleural effusion [J90]  PE (pulmonary thromboembolism) (HCC) [I26.99]  Chronic anemia [D64.9]  Acute pulmonary embolism, unspecified pulmonary embolism type, unspecified whether acute cor pulmonale present (HCC) [I26.99]      abdominal pain starting a day or so ago, started across the diffuse lower abdomen and now the upper and lower abdomen hurt is putting pressure up in the lower chest he states.  Surgery: none  Visit Diagnoses         Codes    Acute pulmonary embolism, unspecified pulmonary embolism type, unspecified whether acute cor pulmonale present (HCC)    -  Primary I26.99    Pleural effusion     J90    Colitis     K52.9    Chronic anemia     D64.9            Patient Active Problem List   Diagnosis    Hemorrhoid thrombosis    Carcinoma of prostate (HCC)    Pernicious anemia    Hyperlipidemia    Gastroesophageal reflux disease    Acute sinusitis    Hydronephrosis due to obstruction of ureter    Hydronephrosis of left kidney    Cancer (HCC)    BPH (benign prostatic hyperplasia)    Mass of left inguinal region    Malignant neuroendocrine neoplasm (HCC)    S/P ureteral stent placement    Chest pain    Acute chest pain    Thrombocytopenia, unspecified (HCC)    Pericardial effusion    Bilateral pleural effusion    Chronic heart failure with preserved ejection fraction (HFpEF) (HCC)    Primary

## 2024-11-25 NOTE — ACP (ADVANCE CARE PLANNING)
Advance Care Planning   Healthcare Decision Maker:    Primary Decision Maker: Maddi Hanna - [facility] - [phone]

## 2024-11-25 NOTE — H&P
performed by Antoine Brothers MD at [facility] OR    COLONOSCOPY  11/02/2016    DEBRIDEMENT Bilateral 9/17/2024    CYSTOSCOPY RETROGRADE PYELOGRAM BILATERAL STENT INSERTION performed by Buck López MD at [facility] OR    IR TUNNELED CATHETER PLACEMENT GREATER THAN 5 YEARS  9/5/2024    IR TUNNELED CATHETER PLACEMENT GREATER THAN 5 YEARS 9/5/2024 JoeyOfelia arango MD [facility] SPECIAL PROCEDURES    KNEE SURGERY Right     TONSILLECTOMY      US LYMPH NODE BIOPSY  12/30/2021    US LYMPH NODE BIOPSY 12/30/2021 [facility] ULTRASOUND    US LYMPH NODE BIOPSY  3/17/2023    US LYMPH NODE BIOPSY 3/17/2023 [facility] ULTRASOUND       Medications Prior to Admission:    Prior to Admission medications    Medication Sig Start Date End Date Taking? Authorizing Provider   metoprolol succinate (TOPROL XL) 25 MG extended release tablet Take 1 tablet by mouth daily 10/4/24   Kyle Kinney MD   pantoprazole (PROTONIX) 40 MG tablet Take 1 tablet by mouth daily  Patient not taking: Reported on 10/7/2024 11/1/24   Kyle Kinney MD   pantoprazole (PROTONIX) 40 MG tablet Take 1 tablet by mouth 2 times daily (before meals) for 28 days, THEN 1 tablet daily. 10/4/24 1/30/25  Kyle Kinney MD   diphenhydrAMINE (BENADRYL) 25 MG tablet Take 2 tablets by mouth every 6 hours as needed for Sleep    ProviderVipin MD   ondansetron (ZOFRAN) 8 MG tablet Take 1 tablet by mouth every 8 hours as needed for Nausea or Vomiting    ProviderVipin MD   acetaminophen (TYLENOL) 500 MG tablet Take 2 tablets by mouth every 6 hours as needed for Pain Us    ProviderVipin MD   enzalutamide (XTANDI) 40 MG capsule Take 2 capsules by mouth 2 times daily    ProviderVipin MD   Multiple Vitamins-Minerals (THERAPEUTIC MULTIVITAMIN-MINERALS) tablet Take 1 tablet by mouth every evening    ProviderVipin MD       Allergies:    Patient has no known allergies.    Social History:    reports that he quit smoking about 37 years ago. His  Lower Chest: Bilateral pleural effusions right greater than left. Organs: Left renal atrophy.  Extensive left perinephric soft tissue density unchanged compared to prior study 09/14/2024.  Malignancy suspected.  Left ureteral stent in appropriate position.  No urinary tract stones or hydronephrosis.  Right ureteral stent in appropriate position. GI/Bowel: Thickening of the proximal sigmoid colon and descending colon. Colonic fecal retention involving the ascending and transverse colon.  Normal small bowel. Pelvis: Asymmetrical thickening of the roof of the urinary bladder. Malignancy cannot be excluded.  Radiopaque prosthetic sees. Peritoneum/Retroperitoneum:   No free fluid or free air. Bones/Soft Tissues: Bilateral hip joint lumbar spine degenerative changes.     1. Thickening of the proximal sigmoid colon and descending colon. Colonic fecal retention involving the ascending and transverse colon. 2. Asymmetrical thickening of the roof of the urinary bladder. Malignancy cannot be excluded. 3. Left renal atrophy. Extensive left perinephric soft tissue density unchanged compared to prior study 09/14/2024. Malignancy suspected. 4. Bilateral ureteral stents in appropriate position. No urinary tract stones or hydronephrosis. 5. Bilateral pleural effusions right greater than left.       EKG: sinus rhythm    ASSESSMENT:      Principal Problem:    PE (pulmonary thromboembolism) (HCC)  Active Problems:    Malignant neuroendocrine neoplasm (HCC)    Hyperlipidemia    Thrombocytopenia, unspecified (HCC)    Bilateral pleural effusion    Chronic heart failure with preserved ejection fraction (HFpEF) (HCC)    Primary hypertension  Resolved Problems:    * No resolved hospital problems. *      PLAN:    PE in setting of malignancy  Distributive shock  - therapeutic lovenox  - monitor telemetry  - echo  - BP improved with IVF in ED  - hematology c/s    Abdominal pain  R/O colitis  - rocephin/flagyl  - monitor for diarrhea, fever,

## 2024-11-25 NOTE — PROGRESS NOTES
4 Eyes Skin Assessment     NAME:  Ace Hanna  YOB: 1945  MEDICAL RECORD NUMBER:  [id number]    The patient is being assessed for  Admission    I agree that at least one RN has performed a thorough Head to Toe Skin Assessment on the patient. ALL assessment sites listed below have been assessed.      Areas assessed by both nurses:    Head, Face, Ears, Shoulders, Back, Chest, Arms, Elbows, Hands, Sacrum. Buttock, Coccyx, Ischium, Legs. Feet and Heels, Under Medical Devices , and Other coccyx red (blanchable), dian ears (behind) red.         Does the Patient have a Wound? No noted wound(s)       Praneeth Prevention initiated by RN: Yes  Wound Care Orders initiated by RN: Yes    Pressure Injury (Stage 3,4, Unstageable, DTI, NWPT, and Complex wounds) if present, place Wound referral order by RN under : No    New Ostomies, if present place, Ostomy referral order under : No     Nurse 1 eSignature: Electronically signed by Marcia Richard RN on 11/25/24 at 2:32 AM EST    **SHARE this note so that the co-signing nurse can place an eSignature**    Nurse 2 eSignature: Electronically signed by Audelia Barajas RN on 11/25/24 at 2:35 AM EST

## 2024-11-25 NOTE — PROGRESS NOTES
Subjective:    The patient is awake and alert, sitting up in chair with wife present at the bedside. He is having some pain in his lower back and is requesting something for pain, RN aware. Stated he vomited this morning but felt better after taking Zofran. Was able to keep lunch down.     Objective:    BP 99/71   Pulse 98   Temp 97.3 °F (36.3 °C) (Oral)   Resp 17   Ht 1.727 m (5' 8\")   Wt 83.5 kg (184 lb)   SpO2 98%   BMI 27.98 kg/m²     General: Alert, oriented, no acute distress  HEENT: No thrush or mucositis, EOMI, PERRLA  Heart:  RRR, no murmurs, gallops, or rubs.  Lungs:  Diminished, no wheeze, rales or rhonchi  Abd: BS present, nontender, nondistended, no masses  Extrem:  No clubbing or cyanosis. Trace BLE  Lymphatics: No palpable adenopathy in cervical and supraclavicular regions  Skin: Intact, no petechia or purpura    CBC with Differential:    Lab Results   Component Value Date/Time    WBC 3.6 11/25/2024 05:44 AM    RBC 2.27 11/25/2024 05:44 AM    HGB 7.9 11/25/2024 05:44 AM    HCT 24.4 11/25/2024 05:44 AM    PLT 89 11/25/2024 05:44 AM    .5 11/25/2024 05:44 AM    MCH 34.8 11/25/2024 05:44 AM    MCHC 32.4 11/25/2024 05:44 AM    RDW 24.1 11/25/2024 05:44 AM    NRBC PENDING 11/25/2024 05:44 AM    METASPCT PENDING 11/25/2024 05:44 AM    METASPCT 0.9 05/03/2023 12:10 AM    LYMPHOPCT PENDING 11/25/2024 05:44 AM    PROMYELOPCT PENDING 11/25/2024 05:44 AM    MONOPCT PENDING 11/25/2024 05:44 AM    MYELOPCT PENDING 11/25/2024 05:44 AM    MYELOPCT 1.0 02/21/2022 01:04 PM    EOSPCT PENDING 11/25/2024 05:44 AM    BASOPCT PENDING 11/25/2024 05:44 AM    MONOSABS PENDING 11/25/2024 05:44 AM    LYMPHSABS PENDING 11/25/2024 05:44 AM    EOSABS PENDING 11/25/2024 05:44 AM    BASOSABS PENDING 11/25/2024 05:44 AM     CMP:    Lab Results   Component Value Date/Time     11/24/2024 06:21 PM    K 4.1 11/24/2024 06:21 PM    K 4.4 02/25/2020 08:34 AM     11/24/2024 06:21 PM    CO2 24 11/24/2024 06:21 PM  hydronephrosis.   5. Bilateral pleural effusions right greater than left.         CT CHEST W CONTRAST   Final Result   Addendum (preliminary) 1 of 1   ADDENDUM:   Findings cuts with Dr. Grant on 11/24/2024 9:11 p.m..         Final   1. Nonocclusive pulmonary embolus involving a pulmonary artery branch vessel   to the right lower lobe.   2. Moderate volume bilateral pleural effusions right greater than left.   3. Mediastinal adenopathy.                Assessment:    Principal Problem:    PE (pulmonary thromboembolism) (HCC)  Active Problems:    Malignant neuroendocrine neoplasm (HCC)    Hyperlipidemia    Thrombocytopenia, unspecified (HCC)    Bilateral pleural effusion    Chronic heart failure with preserved ejection fraction (HFpEF) (HCC)    Primary hypertension  Resolved Problems:    * No resolved hospital problems. *    78 year old gentleman known to Dr. 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 After initial 6 cycles of Carbo-etoposide, he had good response but did develop recurrence in March 2023. 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 On 9/25/24 he started back on carbo-etoposide-tecentriq, last treatment 11/7/24. If patient tolerates treatment the plan is to repeat scans after 12 weeks of therapy. If no response to treatment the recommendation is to transition to hospice.  Was found to have +occult stool end of Sept and was referred to GI who offered to do endoscopies however patient and his wife refused.  Dialysis discontinued in Oct.    He presents to the ER with complaints of persistent abdominal and back pain that started Saturday. CT chest

## 2024-11-25 NOTE — PROGRESS NOTES
[facility] Hospitalist Progress Note    Admitting Date and Time: 11/24/2024  5:39 PM  Admit Dx: Colitis [K52.9]  Pleural effusion [J90]  PE (pulmonary thromboembolism) (HCC) [I26.99]  Chronic anemia [D64.9]  Acute pulmonary embolism, unspecified pulmonary embolism type, unspecified whether acute cor pulmonale present (HCC) [I26.99]      Assessment:    Principal Problem:    PE (pulmonary thromboembolism) (HCC)  Active Problems:    Malignant neuroendocrine neoplasm (HCC)    Hyperlipidemia    Thrombocytopenia, unspecified (HCC)    Bilateral pleural effusion    Chronic heart failure with preserved ejection fraction (HFpEF) (HCC)    Primary hypertension  Resolved Problems:    * No resolved hospital problems. *      Plan:  11/24/2024    PE in setting of malignancy  Distributive shock  - therapeutic lovenox  - monitor telemetry  - echo  - BP improved with IVF in ED  - hematology c/s     Abdominal pain  R/O colitis  - rocephin/flagyl  - monitor for diarrhea, fever, chills     Chronic obstructive uropathy s/p ureteral stents  Metastatic neuroendocrine cancer  Metastatic prostate cancer  - on chemo  - follows with [facility]  - palliative care for symptom management     HFpEF without exacerbation  Bilateral pleural effusions- possibly malignant  - has had pleural effusions  - no SOB at this time, will monitor     CKD  - was on dialysis  - trend creatinine  - avoid nephrotoxic agents     11/25/2024  --consult to hem/onc pending  --continue therapeutic lovenox, transition to oac at d/c  --on rocephin and flagyl at this time  --control pain  --renal fxn is stable  -- Did see the patient's platelets, can continue Lovenox for now however will transition to oral anticoagulation as soon as possible    Code Status: DNR-CCA  DVT prophylaxis: Lovenox    Continue at home medications as previously prescribed for the management of chronic conditions during this admission if/where appropriate.    Subjective:  Patient is being

## 2024-11-25 NOTE — PROGRESS NOTES
Comprehensive Nutrition Assessment    Type and Reason for Visit:  Initial, Positive nutrition screen    Nutrition Recommendations/Plan:   Continue current diet and ONS Ensure Enlive TID        Malnutrition Assessment:  Malnutrition Status:  Severe malnutrition (11/25/24 1501)    Context:  Chronic Illness     Findings of the 6 clinical characteristics of malnutrition:  Energy Intake:  75% or less estimated energy requirements for 1 month or longer  Weight Loss:  Greater than 10% over 6 months     Body Fat Loss:  Mild body fat loss Orbital, Triceps, Buccal region   Muscle Mass Loss:  Mild muscle mass loss Temples (temporalis), Clavicles (pectoralis & deltoids), Hand (interosseous)  Fluid Accumulation:  No fluid accumulation     Strength:  Not Performed    Nutrition Assessment:    Pt admit w/ PE in setting of malignancy. 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 CKD - recent start of HD on 9/2/24 w/ volume overload/THU, chronic obstructive uropathy, Gómez's esophagus, L nephrolithiasis s/p stent, 8/30 RRT w/ hypotension, CHF. Pt meets criteria for severe malnutrition. Pt is on regular diet, intake ~50%. Pt started on ensure enlive TID, will continue to encourage intake.    Nutrition Related Findings:    abd soft, round, tender, active BSx4, RLE edema, I/O's WDL, missing teeth, A&Ox4, Wound Type: None       Current Nutrition Intake & Therapies:    Average Meal Intake: 51-75%, 26-50%  Average Supplements Intake: None Ordered  ADULT DIET; Regular  ADULT ORAL NUTRITION SUPPLEMENT; Breakfast; Standard High Calorie/High Protein Oral Supplement    Anthropometric Measures:  Height: 172.7 cm (5' 7.99\")  Ideal Body Weight (IBW): 154 lbs (70 kg)    Admission Body Weight: 79.4 kg (175 lb) (11/24 stated)  Current Body Weight: 83.5 kg (184 lb 1.4 oz) (11/25 bed scale), 119.5 % IBW. Weight Source: Bed scale  Current BMI (kg/m2): 28  Usual

## 2024-11-25 NOTE — ED NOTES
Attempted to call CT to take pt over for exam but no answer. Will call back.  
Pt asked for something to help him sleep; this RN notified Dr Mckeon.   
Pt finished oral contrast at 1822.  
Pt on 2 L NC per orders.   
Pt to CT.  
Pt's wife, Maddi, was notified regarding pt's admission.   
Report given to tele; all questions answered.   
Viral respiratory infection

## 2024-11-25 NOTE — PROGRESS NOTES
OCCUPATIONAL THERAPY INITIAL EVALUATION    [facility]  [address] [facility]         Date:2024                                                   Patient Name: Ace Hanna     MRN: [id number]     : 1945     Room: [address] [place] [address]       Evaluating OT: Samantha Rios, OTR/L; [id number]       Referring Provider and Orders/Date:    OT eval and treat  Start:  24,   End:  24,   ONE TIME,   Standing Count:  1 Occurrences,   R       Anne Mckeon DO    Recommended placement: home with HH for endurance; however, pt reported that he would decline and didn't need therapy       Diagnosis:   1. Acute pulmonary embolism, unspecified pulmonary embolism type, unspecified whether acute cor pulmonale present (HCC)    2. Pleural effusion    3. Colitis    4. Chronic anemia         Surgery: none      Pertinent Medical History:        Past Medical History:   Diagnosis Date    Acid reflux     Barrette esophagus     Gómez esophagus     BPH (benign prostatic hyperplasia)     Cancer (HCC) 2021    prostrate cancer     H/O cardiovascular stress test 2023    Nuclear Lexiscan Stress Test    [place] (hard of hearing)     bilateral hearing aids    Pernicious anemia           Past Surgical History:   Procedure Laterality Date    APPENDECTOMY      BLADDER SURGERY Left 3/16/2023    CYSTOSCOPY RETROGRADE PYELOGRAM LEFT STENT INSERTION **CALL WITH TIME** performed by Nathan López MD at [facility] OR    CARDIAC SURGERY N/A 2024    PERICARDIAL WINDOW CREATION, NICOLE performed by Yumiko Klein DO at [facility] [place]    CARPAL TUNNEL RELEASE Right     CATHETER INSERTION N/A 2022    MEDIPORT CATHETER INSERTION performed by Antoine Brothers MD at [facility] OR    COLONOSCOPY  2016    DEBRIDEMENT Bilateral 2024    CYSTOSCOPY RETROGRADE PYELOGRAM BILATERAL STENT INSERTION performed by Buck López MD at [facility] OR     TUNNELED  [place]    Bathing Minimal Assist suspected, but pt declining full bath. Did freshen up in standing at sink  Modified [place]    Toileting Supervision in standing  Modified [place]    Bed Mobility  Out of bed on arrival  Supine to sit: Independent   Sit to supine: Independent    Functional Transfers Supervision for safety and balance  Independent    Functional Mobility Supervision for safety and dropping O2  Independent    Balance Sitting:     Static:  good    Dynamic:fair+  Standing: fair  Sitting:     Dynamic:good  Standing: fair+   Activity Tolerance Vitals with activity: fair overall with standing 5min and 88-95% -113    Increase standing tolerance for >10min with stable vital signs for carry over into toileting, functional tranfers and indep in ADLs   Visual/  Perceptual Glasses: Present; WFL    Reports change in vision since admission: No     NA     Hand Dominance  [x] Right  [] Left    AROM (PROM) Strength Additional Info:  Goal:   RUE  WFL 4/5 good  and  FMC/dexterity noted during ADL tasks  Opposition [x] Intact [] Impaired  Finger to nose [x] Intact [] Impaired 5/5MMT generally for carry over into self care, functional transfers and functional mobility with AD.    LUE WFL 4/5 good  and  FMC/dexterity noted during ADL tasks  Opposition [x] Intact [] Impaired  Finger to nose [x] Intact [] Impaired 5/5MMT generally for carry over into self care, functional transfers and functional mobility with AD.      Hearing: WFL   Sensation:  No c/o numbness or tingling   Tone: WFL   Edema: none    Comments: Upon arrival patient supine. The biggest barriers reflect that of functional transfers, functional mobility, UB/LB ADLs, cognition, activity tolerance, balance, safety and strengthening. At end of session, patient supine with call light and phone within reach, all lines and tubes intact.  Overall patient demonstrated decreased independence and safety during completion of ADL/functional

## 2024-11-25 NOTE — CONSULTS
Palliative Care Department  [phone]  Palliative Care Initial Consult  Provider Zuri Iglesias, APRN - CNP     Ace Hanna  [id number]  Hospital Day: 2  Date of Initial Consult: 11/24/2024  Referring Provider: Anne Mckeon DO  Palliative Medicine was consulted for assistance with: Overwhelming symptoms    HPI:   Ace Hanna is a 79 y.o. with a medical history of prostate cancer with bone mets, neuroendocrine cancer with metastasis, malignant pericardial effusion, HLD, HFpEF, CKD who was admitted on 11/24/2024 from home with a CHIEF COMPLAINT of abd pain, back pain. Workup in ED significant for BUN 32, creatinine 1.4 (at baseline), troponin 124 and 106 (chronically elevated), WBC 3.2, hgb 7.8, platelets 86.  UA hgb and leukocyte esterase positive.  CT chest with contrast nonocclusive RLL PE, moderate bilateral pleural effusions.  CT abd/pelvis sigmoid and descending colon thickening and fecal retention, known kidney mass, bilateral ureteral stents.  Oncology consulted.  Palliative medicine consulted for further assistance.    ASSESSMENT/PLAN:     Pertinent Hospital Diagnoses     PE  Abd pain  Metastatic neuroendocrine cancer  Metastatic prostate cancer  HFpEF  CKD      Palliative Care Encounter / Counseling Regarding Goals of Care  Please see detailed goals of care discussion as below  At this time, Ace Hanna, Does have capacity for medical decision-making.  Capacity is time limited and situation/question specific  During encounter there was no surrogate medical decision-maker  Outcome of goals of care meeting:   Continue current medical management  Reviewed CODE STATUS  Symptom management  Code status DNR-CCA  Advanced Directives: no POA or living will in [facility]  Surrogate/Legal NOK:  Maddi Hanna (spouse) [phone]      Pain due to neoplasm:   -Norco 5/325 mg every 4 hours as needed   -IV fentanyl 25 mcg-50 mcg every 2 hours as needed for breakthrough pain   -Lidocaine patch

## 2024-11-26 NOTE — PROGRESS NOTES
S: Patient awake, states he has some loose stool and back pain that wraps around front of abdomen, very tired, some nausea, chemotherapy has been difficult on him.    O:  Gen- alert, talking, NAD  HEENT- mmm  Lungs- decreased lower 1/4 lung bases  Cardiac- regular  ABd-distended, mild tenderness +BS   Ext- edema legs bilaterally    Labs:  WBC=3.6 Hb=7.9 plt=89    A/P:   79 year old gentleman well known to me with stage IV high grade neuroendocrine cancer metastatic to abdominal nodes, malignant pericardial effusion and pleural effusions. He had recent recurrence and has had 3 cycles of carbo-etoposide-tecentriq but CT scans show no response and having too many side effects from chemotherapy. I discussed with the patient and his wife today that I recommend transitioning to hospice comfort care and they are both in agreement.    Start ms contin 15mg po q 12 hours  Small non occlusive right lung PE, he has had recent occult + stools and refused GI endoscopies and severe anemia. More harm than good to continue anticoagulation and we will discontinue anticoagulation  Home hospice care  Lasix 20mg po daily for edema  Okay to discharge home with hospice    Jory Tillman MD

## 2024-11-26 NOTE — PROGRESS NOTES
[facility] Hospitalist Progress Note    Admitting Date and Time: 11/24/2024  5:39 PM  Admit Dx: Colitis [K52.9]  Pleural effusion [J90]  PE (pulmonary thromboembolism) (HCC) [I26.99]  Chronic anemia [D64.9]  Acute pulmonary embolism, unspecified pulmonary embolism type, unspecified whether acute cor pulmonale present (HCC) [I26.99]      Assessment:    Principal Problem:    PE (pulmonary thromboembolism) (HCC)  Active Problems:    Malignant neuroendocrine neoplasm (HCC)    Hyperlipidemia    Thrombocytopenia, unspecified (HCC)    Bilateral pleural effusion    Chronic heart failure with preserved ejection fraction (HFpEF) (HCC)    Primary hypertension    Severe protein-calorie malnutrition (HCC)  Resolved Problems:    * No resolved hospital problems. *      Plan:  11/24/2024    PE in setting of malignancy  Distributive shock  - therapeutic lovenox  - monitor telemetry  - echo  - BP improved with IVF in ED  - hematology c/s     Abdominal pain  R/O colitis  - rocephin/flagyl  - monitor for diarrhea, fever, chills     Chronic obstructive uropathy s/p ureteral stents  Metastatic neuroendocrine cancer  Metastatic prostate cancer  - on chemo  - follows with [facility]  - palliative care for symptom management     HFpEF without exacerbation  Bilateral pleural effusions- possibly malignant  - has had pleural effusions  - no SOB at this time, will monitor     CKD  - was on dialysis  - trend creatinine  - avoid nephrotoxic agents     11/25/2024  --consult to hem/onc pending  --continue therapeutic lovenox, transition to oac at d/c  --on rocephin and flagyl at this time  --control pain  --renal fxn is stable  -- Did see the patient's platelets, can continue Lovenox for now however will transition to oral anticoagulation as soon as possible    11/26/2024  --transition to po abx for colitis  --transition to po eliquis or xarelto for malignancy related PE  --control pain, appreciate palliative input  --dietary supps

## 2024-11-26 NOTE — PROGRESS NOTES
Physical Therapy Treatment Note/Plan of Care    Room #:  0438/0438-01  Patient Name: Ace Hanna  YOB: 1945  MRN: [id number]    Date of Service: 11/26/2024     Tentative placement recommendation: Home with Home Health Physical Therapy, however at this time patient declined HHPT  Equipment recommendation: None  at this time    Evaluating Physical Therapist: Carmen Boss, PT #[id number]      Specific Provider Orders/Date/Referring Provider :  11/24/24 2315    PT evaluation and treat  Start:  11/24/24 2315,   End:  11/24/24 2315,   ONE TIME,   Standing Count:  1 Occurrences,   R       Anne Mckeon DO    Admitting Diagnosis:   Colitis [K52.9]  Pleural effusion [J90]  PE (pulmonary thromboembolism) (HCC) [I26.99]  Chronic anemia [D64.9]  Acute pulmonary embolism, unspecified pulmonary embolism type, unspecified whether acute cor pulmonale present (HCC) [I26.99]      abdominal pain starting a day or so ago, started across the diffuse lower abdomen and now the upper and lower abdomen hurt is putting pressure up in the lower chest he states.  Surgery: none  Visit Diagnoses         Codes    Acute pulmonary embolism, unspecified pulmonary embolism type, unspecified whether acute cor pulmonale present (HCC)    -  Primary I26.99    Pleural effusion     J90    Colitis     K52.9    Chronic anemia     D64.9            Patient Active Problem List   Diagnosis    Hemorrhoid thrombosis    Carcinoma of prostate (HCC)    Pernicious anemia    Hyperlipidemia    Gastroesophageal reflux disease    Acute sinusitis    Hydronephrosis due to obstruction of ureter    Hydronephrosis of left kidney    Cancer (HCC)    BPH (benign prostatic hyperplasia)    Mass of left inguinal region    Malignant neuroendocrine neoplasm (HCC)    S/P ureteral stent placement    Chest pain    Acute chest pain    Thrombocytopenia, unspecified (HCC)    Pericardial effusion    Bilateral pleural effusion    Chronic heart failure with preserved  care,  importance of mobility while in hospital , and ankle pumps, quad set and glut set for edema control, blood clot prevention    Patient response to education:   Pt verbalized understanding Pt demonstrated skill Pt requires further education in this area   Yes Partial Yes      Treatment:  Patient practiced and was instructed/facilitated in the following treatment:      patient in chair. Performed seated exercise. Patient declined all other services asked to perform. Educated patient on safety and importance of PT however declined more therapy at this time and Home services.  Patient with swollen BLE propped BLE onto stole.      Therapeutic Exercises:  ankle pumps, long arc quad, and seated marching  x 5-8 reps.       At end of session, patient in chair with     call light and phone within reach,  all lines and tubes intact, nursing notified.      Patient would benefit from continued skilled Physical Therapy to improve functional independence and quality of life.         Patient's/ family goals   home    Time in  1040  Time out  1053    Total Treatment Time  13 minutes      CPT codes:    Therapeutic exercises (66646)   8 minutes  1 unit(s)  Non billable time 5 minutes    Veena Guillen PTA lic#[id number]

## 2024-11-26 NOTE — PROGRESS NOTES
Patients BP 86/57 while sitting in chair. Asking for Oral NORCO for pain. Called Dr. Valdivia to confirm if it was still okay to give. She is okay with him getting the norco if he is in pain.

## 2024-11-26 NOTE — PROGRESS NOTES
Subjective:    The patient is awake and alert.  Was not able to sleep well due to ongoing back and abdominal pain. States he has some nausea and does not really have much of an appetite.    Objective:    BP (!) 87/63   Pulse 93   Temp 98.1 °F (36.7 °C) (Oral)   Resp 18   Ht 1.727 m (5' 7.99\")   Wt 83.5 kg (184 lb)   SpO2 96%   BMI 27.98 kg/m²     General: Alert, oriented, no acute distress  HEENT: No thrush or mucositis, EOMI, PERRLA  Heart:  RRR, no murmurs, gallops, or rubs.  Lungs:  CTA bilaterally, no wheeze, rales or rhonchi  Abd: BS present, nontender, nondistended, no masses  Extrem:  No clubbing, cyanosis, or edema  Lymphatics: No palpable adenopathy in cervical and supraclavicular regions  Skin: Intact, no petechia or purpura    CBC with Differential:    Lab Results   Component Value Date/Time    WBC 3.6 11/25/2024 05:44 AM    RBC 2.27 11/25/2024 05:44 AM    HGB 7.9 11/25/2024 05:44 AM    HCT 24.4 11/25/2024 05:44 AM    PLT 89 11/25/2024 05:44 AM    .5 11/25/2024 05:44 AM    MCH 34.8 11/25/2024 05:44 AM    MCHC 32.4 11/25/2024 05:44 AM    RDW 24.1 11/25/2024 05:44 AM    NRBC 2 11/25/2024 05:44 AM    METASPCT 0.9 05/03/2023 12:10 AM    LYMPHOPCT 14 11/25/2024 05:44 AM    MONOPCT 8 11/25/2024 05:44 AM    MYELOPCT 1 09/08/2024 05:31 AM    MYELOPCT 1.0 02/21/2022 01:04 PM    EOSPCT 0 11/25/2024 05:44 AM    BASOPCT 0 11/25/2024 05:44 AM    MONOSABS 0.29 11/25/2024 05:44 AM    LYMPHSABS 0.50 11/25/2024 05:44 AM    EOSABS 0.00 11/25/2024 05:44 AM    BASOSABS 0.00 11/25/2024 05:44 AM     CMP:    Lab Results   Component Value Date/Time     11/25/2024 05:44 AM    K 4.5 11/25/2024 05:44 AM    K 4.4 02/25/2020 08:34 AM     11/25/2024 05:44 AM    CO2 18 11/25/2024 05:44 AM    BUN 28 11/25/2024 05:44 AM    CREATININE 1.3 11/25/2024 05:44 AM    CREATININE 1.2 12/30/2019 12:00 AM    GFRAA >60 02/21/2022 01:04 PM    LABGLOM 56 11/25/2024 05:44 AM    LABGLOM 59 04/23/2024 10:30 AM    GLUCOSE 116  lesions, T8, biopsy proven 12/30/22. After initial 6 cycles of Carbo-etoposide, he had good response but did develop recurrence in March 2023. 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 On 9/25/24 he started back on carbo-etoposide-tecentriq, last treatment 11/7/24. If patient tolerates treatment the plan is to repeat scans after 12 weeks of therapy. If no response to treatment the recommendation is to transition to hospice.  Was found to have +occult stool end of Sept and was referred to GI who offered to do endoscopies however patient and his wife refused.  Dialysis discontinued in Oct.     He presents to the ER with complaints of persistent abdominal and back pain that started Saturday. CT chest revealed RLL PE, moderate pleural effusions, right greater than the left, mediastinal adenopathy.    Plan:    - Continue MS Contin 15 mg po q 12 hrs  - Lasix 20 mg po daily for edema  Patient will be discharged home with [facility] tomorrow.     Collaboration of care with Dr. Tillman    Electronically signed by MANOHAR West - CNP on 11/26/2024 at 6:58 AM

## 2024-11-26 NOTE — PROGRESS NOTES
Spiritual Health History and Assessment/Progress Note  [facility]    Initial Encounter, Spiritual/Emotional Needs,  ,  ,      Name: Ace Hanna MRN: [id number]    Age: 79 y.o.     Sex: male   Language: English   Jehovah's witness: Oriental orthodox   PE (pulmonary thromboembolism) (HCC)     Date: 11/26/2024                           Spiritual Assessment began in [facility] MED SURG TELE        Referral/Consult From: Rounding   Encounter Overview/Reason: Initial Encounter, Spiritual/Emotional Needs  Service Provided For: Patient    Misty, Belief, Meaning:   Patient is connected with a misty tradition or spiritual practice  Family/Friends No family/friends present      Importance and Influence:  Patient has spiritual/personal beliefs that influence decisions regarding their health  Family/Friends No family/friends present    Community:  Patient is connected with a spiritual community  Family/Friends No family/friends present    Assessment and Plan of Care:     Patient Interventions include: Engaged in theological reflection  Family/Friends Interventions include: No family/friends present    Patient Plan of Care: Spiritual Care available upon further referral  Family/Friends Plan of Care: No family/friends present    Electronically signed by Chaplain Sherry on 11/26/2024 at 11:42 AM

## 2024-11-26 NOTE — DISCHARGE INSTRUCTIONS
Your information:  Name: Ace Hanna  : 1945    Your instructions:    YOU ARE BEING DISCHARGED HOME. PLEASE MAKE AND KEEP YOUR FOLLOW UP APPOINTMENTS.     What to do after you leave the hospital:    Recommended diet: regular diet    Recommended activity: activity as tolerated        The following personal items were collected during your admission and were returned to you:    Belongings  Dental Appliances: None  Vision - Corrective Lenses: None  Hearing Aid: Bilateral hearing aids (not here)  Clothing: Footwear, Pants, Shirt  Jewelry: None  Electronic Devices: None  Weapons (Notify Protective Services/Security): None  Home Medications: None  Valuables Given To: Family (Comment) (wife)  Provide Name(s) of Who Valuable(s) Were Given To: wife    Information obtained by:  By signing below, I understand that if any problems occur once I leave the hospital I am to contact Dr. Pope.  I understand and acknowledge receipt of the instructions indicated above.

## 2024-11-27 DIAGNOSIS — G89.3 NEOPLASM RELATED PAIN: ICD-10-CM

## 2024-11-27 DIAGNOSIS — F41.9 ANXIETY: ICD-10-CM

## 2024-11-27 DIAGNOSIS — R09.89 AIR HUNGER: ICD-10-CM

## 2024-11-27 DIAGNOSIS — C7A.8 MALIGNANT NEUROENDOCRINE NEOPLASM (HCC): ICD-10-CM

## 2024-11-27 DIAGNOSIS — Z51.5 HOSPICE CARE PATIENT: Primary | ICD-10-CM

## 2024-11-27 RX ORDER — OLANZAPINE 5 MG/1
5 TABLET, ORALLY DISINTEGRATING ORAL EVERY 12 HOURS PRN
Qty: 5 TABLET | Refills: 0 | Status: SHIPPED | OUTPATIENT
Start: 2024-11-27

## 2024-11-27 RX ORDER — HYDROCODONE BITARTRATE AND ACETAMINOPHEN 5; 325 MG/1; MG/1
1 TABLET ORAL EVERY 4 HOURS PRN
Qty: 18 TABLET | Refills: 0 | Status: SHIPPED | OUTPATIENT
Start: 2024-11-27 | End: 2024-11-29 | Stop reason: SDUPTHER

## 2024-11-27 RX ORDER — HALOPERIDOL 1 MG/1
1 TABLET ORAL EVERY 6 HOURS PRN
Qty: 5 TABLET | Refills: 0 | Status: SHIPPED | OUTPATIENT
Start: 2024-11-27

## 2024-11-27 RX ORDER — MORPHINE SULFATE 100 MG/5ML
5 SOLUTION ORAL EVERY 6 HOURS PRN
Qty: 30 ML | Refills: 0 | Status: SHIPPED | OUTPATIENT
Start: 2024-11-27 | End: 2024-12-27

## 2024-11-27 RX ORDER — MORPHINE SULFATE 15 MG/1
15 TABLET, FILM COATED, EXTENDED RELEASE ORAL EVERY 12 HOURS SCHEDULED
Qty: 6 TABLET | Refills: 0 | Status: SHIPPED | OUTPATIENT
Start: 2024-11-27 | End: 2024-11-30

## 2024-11-27 RX ORDER — GLYCOPYRROLATE 2 MG/1
2 TABLET ORAL 3 TIMES DAILY PRN
Qty: 5 TABLET | Refills: 0 | Status: SHIPPED | OUTPATIENT
Start: 2024-11-27 | End: 2024-11-27 | Stop reason: HOSPADM

## 2024-11-27 RX ORDER — ACETAMINOPHEN 650 MG/1
650 SUPPOSITORY RECTAL EVERY 4 HOURS PRN
Qty: 3 SUPPOSITORY | Refills: 0 | Status: SHIPPED | OUTPATIENT
Start: 2024-11-27

## 2024-11-27 RX ORDER — LORAZEPAM 1 MG/1
1 TABLET ORAL EVERY 6 HOURS PRN
Qty: 5 TABLET | Refills: 0 | Status: SHIPPED | OUTPATIENT
Start: 2024-11-27 | End: 2024-11-27 | Stop reason: HOSPADM

## 2024-11-27 NOTE — PROGRESS NOTES
Spiritual Health History and Assessment/Progress Note  [facility]     Encounter, Rituals, Rites and Sacraments,  ,  ,      Name: Ace Hanna MRN: [id number]    Age: 79 y.o.     Sex: male   Language: English   Orthodox: Hinduism   PE (pulmonary thromboembolism) ([facility])     Date: 11/27/2024                           Spiritual Assessment began in [address] SURG TELE        Referral/Consult From: Rounding   Encounter Overview/Reason:  Encounter, Rituals, Rites and Sacraments  Service Provided For: Patient    Misty, Belief, Meaning:   Patient is connected with a misty tradition or spiritual practice  Family/Friends No family/friends present      Importance and Influence:  Patient has no beliefs influential to healthcare decision-making identified during this visit  Family/Friends No family/friends present    Community:  Patient feels well-supported. Support system includes: Spouse/Partner  Family/Friends No family/friends present    Assessment and Plan of Care:     Patient Interventions include: Affirmed coping skills/support systems and Provided sacramental/Temple ritual  Family/Friends Interventions include: No family/friends present    Patient Plan of Care: Spiritual Care available upon further referral  Family/Friends Plan of Care: Spiritual Care available upon further referral    Electronically signed by Chaplain Betsy on 11/27/2024 at 3:33 PM

## 2024-11-27 NOTE — TELEPHONE ENCOUNTER
Patient is being admitted to hospice care.  Hospice comfort care kit was sent to hospice pharmacy to place in the home setting. Refills for MS Contin and Norco were also sent.

## 2024-11-27 NOTE — PLAN OF CARE
Problem: Pain  Goal: Verbalizes/displays adequate comfort level or baseline comfort level  11/27/2024 1022 by Devi Corley RN  Outcome: Progressing  11/27/2024 0543 by Katharina Loredo RN  Outcome: Progressing     Problem: Safety - Adult  Goal: Free from fall injury  11/27/2024 1022 by Devi Corley RN  Outcome: Progressing  11/27/2024 0543 by Katharina Loredo RN  Outcome: Progressing     Problem: Hematologic - Adult  Goal: Maintains hematologic stability  11/27/2024 1022 by Devi Corley RN  Outcome: Progressing  11/27/2024 0543 by Katharina Loredo RN  Outcome: Progressing     Problem: Nutrition Deficit:  Goal: Optimize nutritional status  11/27/2024 1022 by Devi Corley RN  Outcome: Progressing  11/27/2024 0543 by Katharina Loredo RN  Outcome: Progressing

## 2024-11-27 NOTE — CARE COORDINATION
11/26/2024 1058 CM note: Pt resides with his wife in a 1 story home, [address].  He has a mediport and was receiving chemo pta. Dr Tillman spoke with pt and then spoke with pt's wife on phone regarding hospice referral. Pt and wife are agreeable, prefer [facility].  Epic referral placed to [facility]-await return call.  Pt plans to return home with wife and hospice. Vivi SHANKS           The Plan for Transition of Care is related to the following treatment goals: Hospice    The Patient and/or patient representative pt's wife was provided with a choice of provider and agrees   with the discharge plan. [x] Yes [] No    Freedom of choice list was provided with basic dialogue that supports the patient's individualized plan of care/goals, treatment preferences and shares the quality data associated with the providers. [] Yes [x] No  declined list, prefers [facility]  
11/26/2024 1230 CM note: Pt resides with his wife in a 1 story home.  He has a mediport and was receiving chemo pta. Pt and wife are agreeable to hospice, prefer [facility]. [facility] accepted pt and spoke with pt/wife. Pt received iv fentanyl this am and being transitioned to po ms contin. [facility] requests that pt receive po meds today and confirm that pain is controlled with po meds prior to d/c. Palliative Care NP will e scribe pain and antiemetic meds for meds to bed. Dr Siegel updated. Plan is for pt to discharge home with [facility] services tomorrow-pt/wife updated. Vivi SHANKS  
11/27/2024 0900 CM note: Pt resides with his wife in a 1 story home. Pt and wife are agreeable to hospice. [facility] accepted patient. Hospice liaison Lydia relays [facility] will start care 11/28/24 and she will call pt's wife this am. [facility] outpt Pharmacy notified of d/c and will deliver meds to patient prior to d/c. Pt's wife to transport home. Emotional support provided to pt/wife.Vivi SHANKS  
Colitis [K52.9]  Pleural effusion [J90]  PE (pulmonary thromboembolism) (HCC) [I26.99]  Chronic anemia [D64.9]  Acute pulmonary embolism, unspecified pulmonary embolism type, unspecified whether acute cor pulmonale present (HCC) [I26.99]      The Patient and/or Patient Representative Agree with the Discharge Plan?  yes    Carmelina Blanc RN  Case Management Department    
Unable to assess due to medical condition

## 2024-11-27 NOTE — PLAN OF CARE
Problem: Pain  Goal: Verbalizes/displays adequate comfort level or baseline comfort level  Outcome: Progressing     Problem: Safety - Adult  Goal: Free from fall injury  Outcome: Progressing     Problem: Hematologic - Adult  Goal: Maintains hematologic stability  Outcome: Progressing     Problem: Nutrition Deficit:  Goal: Optimize nutritional status  Outcome: Progressing

## 2024-11-27 NOTE — PROGRESS NOTES
CLINICAL PHARMACY NOTE: MEDS TO BEDS    Total # of Prescriptions Filled: 3   The following medications were delivered to the patient:  Ondansetron 8 mg  Lorazepam 1 mg  Glycopyrrolate 1 mg      Additional Documentation:    Delivered to nurses station

## 2024-11-27 NOTE — DISCHARGE SUMMARY
of prostate cancer with bone mets, neuroendocrine cancer with metastasis, malignant pericardial effusion, HLD, HFpEF, CKD presents with abdominal and back pain since yesterday.  Reports the pain is constant, not cramping.  No associated nausea.  Workup in ED significant for BUN 32, creatinine 1.4 (at baseline), troponin 124 and 106 (chronically elevated), WBC 3.2, hgb 7.8, platelets 86.  UA hgb and leukocyte esterase positive.  CT chest with contrast nonocclusive RLL PE, moderate bilateral pleural effusions.  CT abd/pelvis sigmoid and descending colon thickening and fecal retention, known kidney mass, bilateral ureteral stents.  He was admitted for further workup, evaluation, and management.  His hospital course and problems progressed as follows:    11/24/2024     PE in setting of malignancy  Distributive shock  - therapeutic lovenox  - monitor telemetry  - echo  - BP improved with IVF in ED  - hematology c/s     Abdominal pain  R/O colitis  - rocephin/flagyl  - monitor for diarrhea, fever, chills     Chronic obstructive uropathy s/p ureteral stents  Metastatic neuroendocrine cancer  Metastatic prostate cancer  - on chemo  - follows with [facility]  - palliative care for symptom management     HFpEF without exacerbation  Bilateral pleural effusions- possibly malignant  - has had pleural effusions  - no SOB at this time, will monitor     CKD  - was on dialysis  - trend creatinine  - avoid nephrotoxic agents     11/25/2024  --consult to hem/onc pending  --continue therapeutic lovenox, transition to oac at d/c  --on rocephin and flagyl at this time  --control pain  --renal fxn is stable  -- Did see the patient's platelets, can continue Lovenox for now however will transition to oral anticoagulation as soon as possible     11/26/2024  --transition to po abx for colitis  --transition to po eliquis or xarelto for malignancy related PE  --control pain, appreciate palliative input  --dietary supps ordered  --if no further  [place] [address]      Phone: [phone]   ciprofloxacin 250 MG tablet  lidocaine 4 % external patch  metroNIDAZOLE 500 MG tablet  traZODone 100 MG tablet       These medications were sent to [facility] - [address] - P [phone] - F [phone]  [address] [place] [address]      Phone: [phone]   glycopyrrolate 1 MG tablet  LORazepam 1 MG tablet  ondansetron 8 MG tablet           Note that greater than than 30 minutes was spent in preparing discharge papers, discussing discharge with patient, medication review, etc.      Signed:  Electronically signed by Kelly Valdivia DO on 11/27/2024 at 7:42 AM

## 2024-11-29 ENCOUNTER — CARE COORDINATION (OUTPATIENT)
Dept: CARE COORDINATION | Age: 79
End: 2024-11-29

## 2024-11-29 DIAGNOSIS — C7A.8 MALIGNANT NEUROENDOCRINE NEOPLASM (HCC): ICD-10-CM

## 2024-11-29 DIAGNOSIS — G89.3 NEOPLASM RELATED PAIN: ICD-10-CM

## 2024-11-29 DIAGNOSIS — Z51.5 HOSPICE CARE PATIENT: ICD-10-CM

## 2024-11-29 RX ORDER — HYDROCODONE BITARTRATE AND ACETAMINOPHEN 5; 325 MG/1; MG/1
1 TABLET ORAL EVERY 4 HOURS PRN
Qty: 90 TABLET | Refills: 0 | Status: SHIPPED | OUTPATIENT
Start: 2024-11-29 | End: 2024-12-29

## 2024-11-29 NOTE — CARE COORDINATION
Care Transitions Note    Initial Call - Call within 2 business days of discharge: Yes    Per chart review, pt discharged home with [facility].  CTN contacted [facility] and spoke with Verenice who confirms pt is actively enrolled under their hospice services as of 24  No pt outreach made, as pt is admitted under hospice services.    Patient: Ace Hanna    Patient : 1945   MRN: [id number]    Reason for Admission: Acute pulmonary embolism  Discharge Date: 24  RURS: Readmission Risk Score: 26.7    Last Discharge Facility       Date Complaint Diagnosis Description Type Department Provider    24 Abdominal Pain; Chest Pain Acute pulmonary embolism, unspecified pulmonary embolism type, unspecified whether acute cor pulmonale present (HCC) ... ED to Hosp-Admission (Discharged) (ADMITTED) [facility] 4 TELE Kelly Valdivia DO; ,...            Was this an external facility discharge? No    Follow Up Appointment:   N/a       Tawny Winters RN

## 2024-12-03 NOTE — PROGRESS NOTES
Physician Progress Note      PATIENT:               VANESSA MENDOZA  CSN #:                  [id number]  :                       1945  ADMIT DATE:       2024 5:39 PM  DISCH DATE:        2024 1:55 PM  RESPONDING  PROVIDER #:        Kelly Valdivia DO          QUERY TEXT:    Patient admitted with PE, noted to have WBC 3.2, RBC 2.33, Platelets 86. This   patient has history of prostate cancer with mets to bone currently on   chemotherapy regiment of carbo-etoposide-tecentriq.  If possible, please   document in progress notes and discharge summary if you are evaluating and/or   treating any of the following:    The medical record reflects the following:  Risk Factors: prostate cancer with mets to bone currently on chemotherapy   regiment of carbo-etoposide-tecentriq.  Clinical Indicators: WBC 3.2, RBC 2.33, Platelets 86.  Treatment: labs and monitoring    Thank you,  Brittany Diaz   Clinical Documentation Improvement Specialist  W: [phone]  Options provided:  -- Antineoplastic (chemotherapy) induced pancytopenia  -- Other - I will add my own diagnosis  -- Disagree - Not applicable / Not valid  -- Disagree - Clinically unable to determine / Unknown  -- Refer to Clinical Documentation Reviewer    PROVIDER RESPONSE TEXT:    Patient has antineoplastic (chemotherapy) induced pancytopenia.    Query created by: Brittany Diaz on 2024 9:33 AM      Electronically signed by:  Kelly Valdivia DO 12/3/2024 6:36 PM

## (undated) DEVICE — SOLUTION IRRIG 3000ML STRL H2O USP UROMATIC PLAS CONT

## (undated) DEVICE — NDL CNTR 40CT FM MAG: Brand: MEDLINE INDUSTRIES, INC.

## (undated) DEVICE — BAG DRNGE COMB PK

## (undated) DEVICE — BLADE ES ELASTOMERIC COAT INSUL DURABLE BEND UPTO 90DEG

## (undated) DEVICE — GOWN,SIRUS,NONRNF,SETINSLV,XL,20/CS: Brand: MEDLINE

## (undated) DEVICE — GOWN,SIRUS,FABRNF,XL,20/CS: Brand: MEDLINE

## (undated) DEVICE — SOLUTION IRRIG 1000ML STRL H2O USP PLAS POUR BTL

## (undated) DEVICE — GLOVE ORANGE PI 7 1/2   MSG9075

## (undated) DEVICE — TOWEL,OR,DSP,ST,BLUE,STD,6/PK,12PK/CS: Brand: MEDLINE

## (undated) DEVICE — TUBING, SUCTION, 1/4" X 10', STRAIGHT: Brand: MEDLINE

## (undated) DEVICE — ELECTRODE PT RET AD L9FT HI MOIST COND ADH HYDRGEL CORDED

## (undated) DEVICE — SOLUTION IRRIG 3000ML 0.9% SOD CHL USP UROMATIC PLAS CONT

## (undated) DEVICE — SYRINGE MED 10ML LUERLOCK TIP W/O SFTY DISP

## (undated) DEVICE — GAUZE,SPONGE,4"X4",16PLY,XRAY,STRL,LF: Brand: MEDLINE

## (undated) DEVICE — GARMENT,MEDLINE,DVT,INT,CALF,MED, GEN2: Brand: MEDLINE

## (undated) DEVICE — GLOVE SURG SZ 65 THK91MIL LTX FREE SYN POLYISOPRENE

## (undated) DEVICE — DRAPE C ARM W41XL65IN UNIV W/ CLP AND RUBBERBAND

## (undated) DEVICE — APPLICATOR MEDICATED 26 CC SOLUTION HI LT ORNG CHLORAPREP

## (undated) DEVICE — MEDI-TRACE CADENCE ADULT, PRE-CONNECT  DEFIBRILLATION ELECTRODE (10 PR/PK) - PHYSIO-CONTROL: Brand: MEDI-TRACE CADENCE

## (undated) DEVICE — HOSE CONN FOR WST MGMT SYS NEPTUNE 2

## (undated) DEVICE — STRAP POS MP 30X3 IN HK LOOP CLOSURE FOAM DISP

## (undated) DEVICE — SET SURG INSTR DISSECT

## (undated) DEVICE — MARKER,SKIN,WI/RULER AND LABELS: Brand: MEDLINE

## (undated) DEVICE — THORACIC: Brand: MEDLINE INDUSTRIES, INC.

## (undated) DEVICE — CLIP LIG M BLU TI HRT SHP WIRE HORZ 24 CLIPS/PK 25PK/CA

## (undated) DEVICE — CATHETER URETH 20FR BLLN 5CC STD LTX 2 W F SGL DRNGE EYE M

## (undated) DEVICE — WIPES SKIN CLOTH READYPREP 9 X 10.5 IN 2% CHLORHEX GLUCONATE CHG PREOP

## (undated) DEVICE — GOWN,SIRUS,FABRNF,L,20/CS: Brand: MEDLINE

## (undated) DEVICE — CATHETER URET 5FR L70CM OPN END SGL LUMN INJ HUB FLEXIMA

## (undated) DEVICE — DRAIN SURG 19FR 100% SIL RADPQ RND CHN FULL FLUT

## (undated) DEVICE — SCANLAN® SUTURE BOOT™ INSTRUMENT JAW COVERS - ORIGINAL YELLOW, MINI PKG (3 PAIR/CARTRIDGE, 1 CARTRIDGE/PKG): Brand: SCANLAN® SUTURE BOOT™ INSTRUMENT JAW COVERS

## (undated) DEVICE — SYRINGE 20ML LL S/C 50

## (undated) DEVICE — INTENDED FOR TISSUE SEPARATION, AND OTHER PROCEDURES THAT REQUIRE A SHARP SURGICAL BLADE TO PUNCTURE OR CUT.: Brand: BARD-PARKER ® STAINLESS STEEL BLADES

## (undated) DEVICE — PACK,LAPAROTOMY,NO GOWNS: Brand: MEDLINE

## (undated) DEVICE — BASIC SINGLE BASIN 1-LF: Brand: MEDLINE INDUSTRIES, INC.

## (undated) DEVICE — GUIDEWIRE ENDOSCP L150CM DIA0.035IN TIP 3CM PTFE NIT

## (undated) DEVICE — 4-PORT MANIFOLD: Brand: NEPTUNE 2

## (undated) DEVICE — BLADE ES L2.44IN S STL HEX LOK DISP VALLEYLAB

## (undated) DEVICE — PICO 7 10CM X 30CM: Brand: PICO™ 7

## (undated) DEVICE — CYSTO PACK: Brand: MEDLINE INDUSTRIES, INC.

## (undated) DEVICE — SYRINGE MED 10ML POLYPR LUERSLIP TIP FLAT TOP W/O SFTY DISP

## (undated) DEVICE — GLOVE ORANGE PI 7   MSG9070

## (undated) DEVICE — DOUBLE BASIN SET: Brand: MEDLINE INDUSTRIES, INC.

## (undated) DEVICE — CLOTH SURG PREP PREOPERATIVE CHLORHEXIDINE GLUC 2% READYPREP

## (undated) DEVICE — NEEDLE HYPO 25GA L1.5IN BLU POLYPR HUB S STL REG BVL STR

## (undated) DEVICE — NEPTUNE E-SEP SMOKE EVACUATION PENCIL, COATED, 70MM BLADE, PUSH BUTTON SWITCH: Brand: NEPTUNE E-SEP

## (undated) DEVICE — TRAP,MUCUS SPECIMEN,40CC: Brand: MEDLINE

## (undated) DEVICE — SYRINGE MED 10ML TRNSLUC BRL PLUNG BLK MRK POLYPR CTRL

## (undated) DEVICE — COVER,LIGHT HANDLE,FLX,1/PK: Brand: MEDLINE INDUSTRIES, INC.

## (undated) DEVICE — DRAIN SURG L3/8-1/2IN DIA3/16IN SIL CARD CONN 1:1 BLAK

## (undated) DEVICE — DRAIN SURG SGL COLL PT TB FOR ATS BG OASIS